# Patient Record
Sex: FEMALE | Race: WHITE | NOT HISPANIC OR LATINO | Employment: OTHER | URBAN - METROPOLITAN AREA
[De-identification: names, ages, dates, MRNs, and addresses within clinical notes are randomized per-mention and may not be internally consistent; named-entity substitution may affect disease eponyms.]

---

## 2017-01-31 ENCOUNTER — TELEPHONE (OUTPATIENT)
Dept: HEPATOLOGY | Facility: CLINIC | Age: 67
End: 2017-01-31

## 2017-01-31 NOTE — TELEPHONE ENCOUNTER
External records reviewed. Pt referred by Dr. Marvin Roldan. HCV RNA and genotype 1b noted. U/s done 12/12/16.  Spoke to pt. Consult scheduled on 2/23 at [facility]. Reminder mailed.

## 2017-02-23 ENCOUNTER — INITIAL CONSULT (OUTPATIENT)
Dept: HEPATOLOGY | Facility: CLINIC | Age: 67
End: 2017-02-23
Payer: MEDICAID

## 2017-02-23 ENCOUNTER — LAB VISIT (OUTPATIENT)
Dept: LAB | Facility: HOSPITAL | Age: 67
End: 2017-02-23
Attending: INTERNAL MEDICINE
Payer: MEDICAID

## 2017-02-23 VITALS
SYSTOLIC BLOOD PRESSURE: 123 MMHG | DIASTOLIC BLOOD PRESSURE: 78 MMHG | HEIGHT: 60 IN | WEIGHT: 108 LBS | HEART RATE: 76 BPM | RESPIRATION RATE: 18 BRPM | TEMPERATURE: 98 F | BODY MASS INDEX: 21.2 KG/M2

## 2017-02-23 DIAGNOSIS — B18.2 CHRONIC HEPATITIS C WITHOUT HEPATIC COMA: ICD-10-CM

## 2017-02-23 DIAGNOSIS — B18.2 CHRONIC HEPATITIS C WITHOUT HEPATIC COMA: Primary | ICD-10-CM

## 2017-02-23 PROCEDURE — 86790 VIRUS ANTIBODY NOS: CPT

## 2017-02-23 PROCEDURE — 99203 OFFICE O/P NEW LOW 30 MIN: CPT | Mod: S$PBB,,, | Performed by: PHYSICIAN ASSISTANT

## 2017-02-23 PROCEDURE — 86706 HEP B SURFACE ANTIBODY: CPT

## 2017-02-23 PROCEDURE — 86704 HEP B CORE ANTIBODY TOTAL: CPT

## 2017-02-23 PROCEDURE — 36415 COLL VENOUS BLD VENIPUNCTURE: CPT | Mod: PO

## 2017-02-23 PROCEDURE — 87340 HEPATITIS B SURFACE AG IA: CPT

## 2017-02-23 PROCEDURE — 99999 PR PBB SHADOW E&M-EST. PATIENT-LVL III: CPT | Mod: PBBFAC,,, | Performed by: PHYSICIAN ASSISTANT

## 2017-02-23 RX ORDER — AMOXICILLIN 500 MG
2 CAPSULE ORAL DAILY
COMMUNITY

## 2017-02-23 RX ORDER — LOVASTATIN 20 MG/1
20 TABLET ORAL NIGHTLY
COMMUNITY
End: 2019-11-07 | Stop reason: SDUPTHER

## 2017-02-23 RX ORDER — CALCIUM CARBONATE 500(1250)
1 TABLET ORAL DAILY
COMMUNITY

## 2017-02-23 RX ORDER — AMITRIPTYLINE HYDROCHLORIDE 25 MG/1
25 TABLET, FILM COATED ORAL NIGHTLY PRN
COMMUNITY
End: 2018-02-06

## 2017-02-23 RX ORDER — LISINOPRIL 20 MG/1
20 TABLET ORAL DAILY
COMMUNITY
End: 2019-11-07 | Stop reason: SDUPTHER

## 2017-02-23 RX ORDER — ALENDRONATE SODIUM 70 MG/1
70 TABLET ORAL
COMMUNITY
End: 2018-02-06

## 2017-02-23 NOTE — PROGRESS NOTES
HEPATOLOGY CLINIC VISIT NOTE - HCV clinic    OUTSIDE REFERRING PROVIDER: Chapo Roldan MD    CHIEF COMPLAINT: Hepatitis C     HISTORY: This is a 66 y.o. White female with recently diagnosed chronic hepatitis C, here for further eval / mngmt. Outside records from Dr Roldan have been received and reviewed.       HCV history:  Recently diagnosed through routine health screening  Risks:  has HCV. Denies transfusions, illicit drug use, tattoos  - Treatment naive  - Genotype 1b  - HCV RNA 2,530,000 IU/mL - 11/2016    Liver staging:  Fibrospect 12/2016 - F0-2    Labs / imaging reveal no evidence of compromised liver function, advanced fibrosis or portal HTN  Labs 11/2016 - normal liver panel and CBC w/ plt count > 300  Outside U/S 12/2016 - normal liver w/ normal portal vein. Normal spleen    Pt feels well  Denies jaundice, dark urine, abdominal distention, hematemesis, melena, slowed mentation.   No abnormal skin rashes. No generalized joint pain.                     Past Medical History   Diagnosis Date    Chronic hepatitis C     HTN (hypertension)     Hyperlipidemia     Osteoporosis      Past Surgical History   Procedure Laterality Date    Total abdominal hysterectomy w/ bilateral salpingoophorectomy         FAMILY HISTORY: Negative for liver disease    SOCIAL HISTORY:   .  has HCV. No kids  History   Smoking Status    Never Smoker   Smokeless Tobacco    Not on file     History   Alcohol Use No     History   Drug Use No       ROS:   No fever, chills, weight loss, fatigue  No chest pain, palpitations, dyspnea, cough  No abdominal pain, change in bowel pattern, nausea, vomiting  No dysuria, hematuria   No skin rashes   No headaches  No lower extremity edema  No depression or anxiety      PHYSICAL EXAM:  Friendly White female, in no acute distress; alert and oriented to person, place and time  VITALS: reviewed  HEENT: Sclerae anicteric.   NECK: Supple  CVS: Regular rate and rhythm. No  murmurs  LUNGS: Normal respiratory effort. Clear bilaterally  ABDOMEN: Flat, soft, nontender. No organomegaly or masses. No ascites or hernias. Good bowel sounds.    SKIN: Warm and dry. No jaundice, No obvious rashes.   EXTREMITIES: No lower extremity edema  NEURO/PSYCH: Normal gate. Memory intact. Thought and speech pattern appropriate. Behavior normal. No depression or anxiety noted.    RECENT LABS:  Outside labs 11/2/16  WBC 5.5, HGB 13.3,   AST 38, ALT 28, TBILI 0.4, ALK PHOS 50, ALBUMIN 5.0, CR 0.85, BUN 15, , K+ 3.8    RECENT IMAGING:  Outside U/S abdomen 12/12/16 - normal      ASSESSMENT  66 y.o. White female with:  1. CHRONIC HEPATITIS C, GENOTYPE 1B - treatment naive  -- FibroSpect 12/2016 - F0-2  -- Unknown Immunity to HAV & HBV      EDUCATION:  The natural history of Hepatitis C, including potential progression to cirrhosis was reviewed. Complications of cirrhosis, including hepatic decompensation, liver cancer, liver failure, need for liver transplant, and death were reviewed.     We discussed the increased progression of liver disease secondary to alcohol use; patient was advised to avoid alcohol completely.     Transmission of Hepatitis C was reviewed, including possible sexual transmission.  Patient should avoid sharing personal products such as razors, toothbrushes, etc.     We reviewed that vaccination against Hepatitis A and Hepatitis B is recommended if patient does not already have immunity.    Recommend avoiding raw seafood.  Limit acetaminophen to 2000mg daily.        PLAN:  1. HAV & HBV studies - vaccinate accordingly  2. FibroScan  3. F/U visit to review results and discuss antiviral therapy

## 2017-02-24 ENCOUNTER — TELEPHONE (OUTPATIENT)
Dept: HEPATOLOGY | Facility: CLINIC | Age: 67
End: 2017-02-24

## 2017-02-24 LAB
HBV CORE AB SERPL QL IA: POSITIVE
HBV SURFACE AB SER-ACNC: POSITIVE M[IU]/ML
HBV SURFACE AG SERPL QL IA: NEGATIVE
HEPATITIS A ANTIBODY, IGG: NEGATIVE

## 2017-02-24 NOTE — TELEPHONE ENCOUNTER
pls call pt:  Labs 2/23 show she has protection against HBV and does NOT need this vaccine    She does NOT have protection against HAV and this vaccine is recommended    i'll send a Rx to the [facility] so they can investigate whether her insurance covers it in the pharmacy. Someone will call to schedule    thanks

## 2017-02-24 NOTE — TELEPHONE ENCOUNTER
MA called pt to relay provider message call successful. Spoke with pt. Provider message relayed.Joe

## 2017-03-13 ENCOUNTER — PROCEDURE VISIT (OUTPATIENT)
Dept: HEPATOLOGY | Facility: CLINIC | Age: 67
End: 2017-03-13
Attending: INTERNAL MEDICINE
Payer: MEDICAID

## 2017-03-13 ENCOUNTER — OFFICE VISIT (OUTPATIENT)
Dept: HEPATOLOGY | Facility: CLINIC | Age: 67
End: 2017-03-13
Payer: MEDICAID

## 2017-03-13 VITALS
HEIGHT: 60 IN | DIASTOLIC BLOOD PRESSURE: 78 MMHG | TEMPERATURE: 97 F | OXYGEN SATURATION: 99 % | BODY MASS INDEX: 21.6 KG/M2 | HEART RATE: 74 BPM | WEIGHT: 110 LBS | RESPIRATION RATE: 18 BRPM | SYSTOLIC BLOOD PRESSURE: 161 MMHG

## 2017-03-13 DIAGNOSIS — B18.2 CHRONIC HEPATITIS C WITHOUT HEPATIC COMA: Primary | ICD-10-CM

## 2017-03-13 DIAGNOSIS — B18.2 CHRONIC HEPATITIS C WITHOUT HEPATIC COMA: ICD-10-CM

## 2017-03-13 PROCEDURE — 99999 PR PBB SHADOW E&M-EST. PATIENT-LVL III: CPT | Mod: PBBFAC,,, | Performed by: PHYSICIAN ASSISTANT

## 2017-03-13 PROCEDURE — 99213 OFFICE O/P EST LOW 20 MIN: CPT | Mod: S$PBB,,, | Performed by: PHYSICIAN ASSISTANT

## 2017-03-13 PROCEDURE — 91200 LIVER ELASTOGRAPHY: CPT | Mod: 26,S$PBB,, | Performed by: PHYSICIAN ASSISTANT

## 2017-03-13 PROCEDURE — 99213 OFFICE O/P EST LOW 20 MIN: CPT | Mod: PBBFAC | Performed by: PHYSICIAN ASSISTANT

## 2017-03-13 NOTE — PROGRESS NOTES
HEPATOLOGY CLINIC VISIT NOTE - HCV clinic    CHIEF COMPLAINT: Hepatitis C     HISTORY: This is a 66 y.o. White female with recently diagnosed chronic hepatitis C, here for f/u. She presents to [facility] today so a fibroscan could be done prior to visit.    Interval history:  - Natural immunity to HBV  - Lacking immunity to HAV - had first vaccine dose 3/2/17    Fibroscan today - minimal liver fibrosis at F0-1    Feels well  Denies jaundice, dark urine, hematemesis, melena, slowed mentation, abdominal distention.       HCV history:  Recently diagnosed through routine health screening  Risks:  has HCV. Denies transfusions, illicit drug use, tattoos  - Treatment naive  - Genotype 1b  - HCV RNA 2,530,000 IU/mL - 11/2016    Liver staging:  Fibrospect 12/2016 - F0-2  FibroScan today kPa 5.8, F0-1    Labs / imaging reveal no evidence of compromised liver function, advanced fibrosis or portal HTN  Labs 11/2016 - normal liver panel and CBC w/ plt count > 300  Outside U/S 12/2016 - normal liver w/ normal portal vein. Normal spleen                  Past Medical History:   Diagnosis Date    Chronic hepatitis C     HTN (hypertension)     Hyperlipidemia     Osteoporosis      Past Surgical History:   Procedure Laterality Date    TOTAL ABDOMINAL HYSTERECTOMY W/ BILATERAL SALPINGOOPHORECTOMY         FAMILY HISTORY: Negative for liver disease    SOCIAL HISTORY:   .  has HCV. No kids  History   Smoking Status    Never Smoker   Smokeless Tobacco    Not on file     History   Alcohol Use No     History   Drug Use No       ROS:   No fever, chills, weight loss, fatigue  No chest pain, palpitations, dyspnea, cough  No abdominal pain, change in bowel pattern, nausea, vomiting  No skin rashes   No headaches  No lower extremity edema  No depression or anxiety      PHYSICAL EXAM:  Friendly White female, in no acute distress; alert and oriented to person, place and time  VITALS: reviewed  HEENT: Sclerae  anicteric.   NECK: Supple  LUNGS: Normal respiratory effort.   ABDOMEN: Flat, soft, nontender.  SKIN: Warm and dry. No jaundice, No obvious rashes.   EXTREMITIES: No lower extremity edema  NEURO/PSYCH: Normal gate. Memory intact. Thought and speech pattern appropriate. Behavior normal. No depression or anxiety noted.    RECENT LABS:  Outside labs 11/2/16  WBC 5.5, HGB 13.3,   AST 38, ALT 28, TBILI 0.4, ALK PHOS 50, ALBUMIN 5.0, CR 0.85, BUN 15, , K+ 3.8    RECENT IMAGING:  Outside U/S abdomen 12/12/16 - normal      ASSESSMENT  66 y.o. White female with:  1. CHRONIC HEPATITIS C, GENOTYPE 1B - treatment naive  -- FibroSpect 12/2016 - F0-2 // FibroScan today F0-1  -- Lacking Immunity to HAV (s/p 1 dose vaccine) & Natural immunity to HBV      DISCUSSION:  Significance of liver staging reviewed.    Goal of antiviral therapy discussed however Medicaid not expected to cover her meds given minimal liver fibrosis. Pt notes she will be changing to Medicare later this year.        PLAN:  Pt to notify me when she has new insurance so we can proceed w/ HCV antiviral therapy  (she will be changing to Medicare later this year)    Recall for f/u visit in 1 year w/ CBC, CMP, INR in case she has not returned by then    CC: Chapo Roldan MD

## 2017-03-13 NOTE — PROCEDURES
Procedures   Name: Tanika Olivera  Date of Procedure : 2017   :: Jennifer B Scheuermann, PA  Diagnosis: HCV  Probe: M    Fibroscan readin.8 KPa    IQR/med:13 %    Fibrosis:F 0-1

## 2017-12-08 ENCOUNTER — TELEPHONE (OUTPATIENT)
Dept: HEPATOLOGY | Facility: CLINIC | Age: 67
End: 2017-12-08

## 2017-12-08 NOTE — TELEPHONE ENCOUNTER
pls schedule pt for appt after 12/30/17 to discuss HCV rx  I believe she will khloe new insurance after then  thanks

## 2017-12-12 NOTE — TELEPHONE ENCOUNTER
I spoke with patient.  She asked that f/u be scheduled 2/6/18 in [place]; done and reminder notice mailed.

## 2018-02-06 ENCOUNTER — LAB VISIT (OUTPATIENT)
Dept: LAB | Facility: HOSPITAL | Age: 68
End: 2018-02-06
Attending: PHYSICIAN ASSISTANT
Payer: MEDICARE

## 2018-02-06 ENCOUNTER — EPISODE CHANGES (OUTPATIENT)
Dept: HEPATOLOGY | Facility: CLINIC | Age: 68
End: 2018-02-06

## 2018-02-06 ENCOUNTER — OFFICE VISIT (OUTPATIENT)
Dept: HEPATOLOGY | Facility: CLINIC | Age: 68
End: 2018-02-06
Payer: MEDICARE

## 2018-02-06 ENCOUNTER — TELEPHONE (OUTPATIENT)
Dept: PHARMACY | Facility: HOSPITAL | Age: 68
End: 2018-02-06

## 2018-02-06 VITALS
DIASTOLIC BLOOD PRESSURE: 62 MMHG | WEIGHT: 112.63 LBS | SYSTOLIC BLOOD PRESSURE: 102 MMHG | RESPIRATION RATE: 18 BRPM | TEMPERATURE: 99 F | HEIGHT: 59 IN | HEART RATE: 61 BPM | OXYGEN SATURATION: 98 % | BODY MASS INDEX: 22.71 KG/M2

## 2018-02-06 DIAGNOSIS — B18.2 CHRONIC HEPATITIS C WITHOUT HEPATIC COMA: Primary | ICD-10-CM

## 2018-02-06 DIAGNOSIS — B18.2 CHRONIC HEPATITIS C WITHOUT HEPATIC COMA: ICD-10-CM

## 2018-02-06 LAB
ALBUMIN SERPL BCP-MCNC: 3.9 G/DL
ALP SERPL-CCNC: 61 U/L
ALT SERPL W/O P-5'-P-CCNC: 44 U/L
ANION GAP SERPL CALC-SCNC: 7 MMOL/L
AST SERPL-CCNC: 67 U/L
BASOPHILS # BLD AUTO: 0.01 K/UL
BASOPHILS NFR BLD: 0.2 %
BILIRUB SERPL-MCNC: 0.4 MG/DL
BUN SERPL-MCNC: 11 MG/DL
CALCIUM SERPL-MCNC: 10.1 MG/DL
CHLORIDE SERPL-SCNC: 104 MMOL/L
CO2 SERPL-SCNC: 31 MMOL/L
CREAT SERPL-MCNC: 0.8 MG/DL
DIFFERENTIAL METHOD: ABNORMAL
EOSINOPHIL # BLD AUTO: 0.1 K/UL
EOSINOPHIL NFR BLD: 2.2 %
ERYTHROCYTE [DISTWIDTH] IN BLOOD BY AUTOMATED COUNT: 12.9 %
EST. GFR  (AFRICAN AMERICAN): >60 ML/MIN/1.73 M^2
EST. GFR  (NON AFRICAN AMERICAN): >60 ML/MIN/1.73 M^2
GLUCOSE SERPL-MCNC: 97 MG/DL
HCT VFR BLD AUTO: 41.1 %
HGB BLD-MCNC: 13.4 G/DL
IMM GRANULOCYTES # BLD AUTO: 0.01 K/UL
IMM GRANULOCYTES NFR BLD AUTO: 0.2 %
INR PPP: 0.9
LYMPHOCYTES # BLD AUTO: 1.7 K/UL
LYMPHOCYTES NFR BLD: 43 %
MCH RBC QN AUTO: 30.2 PG
MCHC RBC AUTO-ENTMCNC: 32.6 G/DL
MCV RBC AUTO: 93 FL
MONOCYTES # BLD AUTO: 0.5 K/UL
MONOCYTES NFR BLD: 11.9 %
NEUTROPHILS # BLD AUTO: 1.7 K/UL
NEUTROPHILS NFR BLD: 42.5 %
NRBC BLD-RTO: 0 /100 WBC
PLATELET # BLD AUTO: 342 K/UL
PMV BLD AUTO: 9.9 FL
POTASSIUM SERPL-SCNC: 4.2 MMOL/L
PROT SERPL-MCNC: 8.1 G/DL
PROTHROMBIN TIME: 9.6 SEC
RBC # BLD AUTO: 4.43 M/UL
SODIUM SERPL-SCNC: 142 MMOL/L
WBC # BLD AUTO: 4.02 K/UL

## 2018-02-06 PROCEDURE — 99999 PR PBB SHADOW E&M-EST. PATIENT-LVL III: CPT | Mod: PBBFAC,,, | Performed by: PHYSICIAN ASSISTANT

## 2018-02-06 PROCEDURE — 85610 PROTHROMBIN TIME: CPT | Mod: PO

## 2018-02-06 PROCEDURE — 3008F BODY MASS INDEX DOCD: CPT | Mod: S$GLB,,, | Performed by: PHYSICIAN ASSISTANT

## 2018-02-06 PROCEDURE — 99213 OFFICE O/P EST LOW 20 MIN: CPT | Mod: S$GLB,,, | Performed by: PHYSICIAN ASSISTANT

## 2018-02-06 PROCEDURE — 87522 HEPATITIS C REVRS TRNSCRPJ: CPT

## 2018-02-06 PROCEDURE — 80053 COMPREHEN METABOLIC PANEL: CPT

## 2018-02-06 PROCEDURE — 36415 COLL VENOUS BLD VENIPUNCTURE: CPT | Mod: PO

## 2018-02-06 PROCEDURE — 85025 COMPLETE CBC W/AUTO DIFF WBC: CPT

## 2018-02-06 RX ORDER — ESTRADIOL 0.1 MG/G
CREAM VAGINAL
COMMUNITY
Start: 2018-01-18 | End: 2019-09-27 | Stop reason: SDUPTHER

## 2018-02-06 RX ORDER — VELPATASVIR AND SOFOSBUVIR 100; 400 MG/1; MG/1
1 TABLET, FILM COATED ORAL DAILY
Qty: 28 TABLET | Refills: 2 | Status: SHIPPED | OUTPATIENT
Start: 2018-02-06 | End: 2018-05-21 | Stop reason: ALTCHOICE

## 2018-02-06 RX ORDER — MIRTAZAPINE 15 MG/1
15 TABLET, FILM COATED ORAL NIGHTLY
COMMUNITY
Start: 2018-01-17 | End: 2019-11-07 | Stop reason: SDUPTHER

## 2018-02-06 NOTE — TELEPHONE ENCOUNTER
[facility] received a prescription for Epclusa and we will contact their insurance company to find out if the medication is covered. We will update patient of status as more information is received. feel free to give us a call with  any questions at [phone].

## 2018-02-06 NOTE — PROGRESS NOTES
HEPATOLOGY CLINIC VISIT NOTE - HCV clinic    CHIEF COMPLAINT: Hepatitis C     HISTORY: This is a 67 y.o. White female with chronic hepatitis C, here for f/u to discuss antiviral therapy. Previously HCV treatment not pursued b/c Medicaid wouldn't cover Rx w/ minimal fibrosis    Feels well  No changes to health history  Very anxious / motivated to have HCV treated.  has been treated since her last visit  Denies jaundice, dark urine, hematemesis, melena, slowed mentation, abdominal distention.       HCV history:  Diagnosed late 2016 through routine health screening  Risks:  has HCV. Denies transfusions, illicit drug use, tattoos  - Treatment naive  - Genotype 1b  - HCV RNA 2,530,000 IU/mL - 11/2016    Liver staging:  Fibrospect 12/2016 - F0-2  FibroScan 3/2017 - kPa 5.8, F0-1    Prior labs/imaging supported fibroscan findings  No evidence of compromised liver function, advanced fibrosis or portal HTN                    Past Medical History:   Diagnosis Date    Chronic hepatitis C     HTN (hypertension)     Hyperlipidemia     Osteoporosis      Past Surgical History:   Procedure Laterality Date    TOTAL ABDOMINAL HYSTERECTOMY W/ BILATERAL SALPINGOOPHORECTOMY         FAMILY HISTORY: Negative for liver disease    SOCIAL HISTORY:   .  has HCV. No kids  History   Smoking Status    Never Smoker   Smokeless Tobacco    Not on file     History   Alcohol Use No     History   Drug Use No       ROS:   No fever, chills, weight loss, fatigue  No chest pain, palpitations, dyspnea, cough  No abdominal pain, change in bowel pattern, nausea, vomiting  No skin rashes   No headaches  No lower extremity edema  No depression or anxiety      PHYSICAL EXAM:  Friendly White female, in no acute distress; alert and oriented to person, place and time  VITALS: reviewed  HEENT: Sclerae anicteric.   NECK: Supple  LUNGS: Normal respiratory effort. Clear bilaterally  CVS: Regular rate and rhythm, No murmurs  ABDOMEN:  Flat, soft, nontender. No organomegaly or masses or ascites  SKIN: Warm and dry. No jaundice, No obvious rashes.   EXTREMITIES: No lower extremity edema  NEURO/PSYCH: Normal gate. Memory intact. Thought and speech pattern appropriate. Behavior normal. No depression or anxiety noted.    RECENT LABS:  Outside labs 11/2/16  WBC 5.5, HGB 13.3,   AST 38, ALT 28, TBILI 0.4, ALK PHOS 50, ALBUMIN 5.0, CR 0.85, BUN 15, , K+ 3.8    RECENT IMAGING:  Outside U/S 12/2016 - normal liver w/ normal portal vein. Normal spleen      ASSESSMENT  67 y.o. White female with:  1. CHRONIC HEPATITIS C, GENOTYPE 1B - treatment naive  -- FibroSpect 12/2016 - F0-2 // FibroScan 3/2017 - F0-1  -- s/p HAV vaccine   -- prior resolved HBV infection (pos HBcAb and HBsAb, neg HBsg)      DISCUSSION:  Discussed goal of HCV eradication to prevent progression of liver disease.  Discussed use of Epclusa daily x 12 weeks w/ potential side effects of fatigue and headache.     Reviewed limitations on acid suppressant medications due to DDI w/ Epclusa:  -- Antacids, H2 Receptor Antagonist, PPI - not taking  Patient instructed to contact me if experiencing acid related symptoms so further recommendations can be made regarding acid suppression therapy.      Herbal / alternative therapies must be discontinued  Discussed importance of medication adherence and risk of treatment failure / viral resistance if not adherent. Pt has verbalized understanding.         PLAN:  1. CBC,CMP,INR, HCV RNA today  2. Obtain authorization to treat HCV with Epclusa daily x 12 weeks  -- Rx will be routed to [facility]  -- Patient will notify me of exact treatment start date so appropriate lab f/u can be scheduled.      CC: Cahpo Roldan MD

## 2018-02-08 LAB
HCV LOG: 5.94 LOG (10) IU/ML
HCV RNA QUANT PCR: ABNORMAL IU/ML
HCV, QUALITATIVE: DETECTED IU/ML

## 2018-02-08 NOTE — TELEPHONE ENCOUNTER
DOCUMENTATION ONLY:  Prior authorization for Epclusa approved from 02/08/2018 to 05/03/2018 x 12 weeks of treatment.   Case ID# None    Co-pay: $8.35    Patient Assistance IS NOT required. Forward to clinical pharmacist for consult & shipment.

## 2018-02-09 ENCOUNTER — TELEPHONE (OUTPATIENT)
Dept: HEPATOLOGY | Facility: CLINIC | Age: 68
End: 2018-02-09

## 2018-02-09 ENCOUNTER — EPISODE CHANGES (OUTPATIENT)
Dept: HEPATOLOGY | Facility: CLINIC | Age: 68
End: 2018-02-09

## 2018-02-15 ENCOUNTER — TELEPHONE (OUTPATIENT)
Dept: PHARMACY | Facility: CLINIC | Age: 68
End: 2018-02-15

## 2018-02-15 NOTE — TELEPHONE ENCOUNTER
Patient called for initial consultation and shipment on Epclusa - na - lvm for call back.    Daniel Szymanski, MITUL.Ph.  Clinical Pharmacist  [facility]  Phone: [phone]

## 2018-02-15 NOTE — TELEPHONE ENCOUNTER
Initial Epclusa consult completed on 2/15. Epclusa will be shipped on  to arrive at patient's home on  via [employer]. $8.35 copay. Patient will start Epclusa on . Address confirmed, CC on file. Confirmed 2 patient identifiers - name and . Therapy Appropriate.     Epclusa 400/100mg- Take one tablet by mouth daily x 12 weeks  Counseling was reviewed:   1. Patient MUST take Epclusa at the SAME time every day.   2. Patient MUST avoid acid reducers without consulting with myself or provider first. Antacids are to be spaced out at least 4 hours apart from Epclusa. Patient denies currently using any acid supressing or controlling medications and was counseled not to begin without consulting provider or myself.  Furthermore patient was counseled to separate calcium carbonate (potential antiacid effect) as well as other supplements she cannot withhold (multivitamin) by at least 4 hours from epclusa.  Patient plans to take Epclusa by itself each morning, supplements at dinner and blood pressure and other medications at night.    3. Potential Side effects include: nausea, headaches, insomnia and fatigue.   Headache: Patient may treat with OTC remedies. If Tylenol is used, dose should not exceed 2000mg per day.    4. Medication list reviewed. No DDIs or allergies noted. Patient MUST contact myself or provider prior to starting any new OTC, herbal, or prescription drugs to avoid potential DDIs.    DDI: Medication list reviewed and potential DDIs addressed.    Discussed the importance of staying well hydrated while on therapy. Compliance stressed - patient to take missed doses as soon as remembered, but NOT to take 2 doses in one day. Patient will report questions or concerns to myself or practitioner. Patient verbalizes understanding. Patient plans to start Epclusa on .  Consultation included: indication; goals of treatment; administration; storage and handling; side effects; how to handle side effects; the  importance of compliance; how to handle missed doses; the importance of laboratory monitoring; the importance of keeping all follow up appointments.  Patient understands to report any medication changes to [facility] and provider. All questions answered and addressed to patients satisfaction. I will f/u with her in 1 week from start, [facility] to contact patient in 3 weeks for refills.     MITUL Pritchett.Ph.  Clinical Pharmacist  [facility]  Phone: [phone]

## 2018-02-16 ENCOUNTER — EPISODE CHANGES (OUTPATIENT)
Dept: HEPATOLOGY | Facility: CLINIC | Age: 68
End: 2018-02-16

## 2018-02-21 ENCOUNTER — TELEPHONE (OUTPATIENT)
Dept: HEPATOLOGY | Facility: CLINIC | Age: 68
End: 2018-02-21

## 2018-02-21 DIAGNOSIS — B18.2 CHRONIC HEPATITIS C WITHOUT HEPATIC COMA: Primary | ICD-10-CM

## 2018-02-22 ENCOUNTER — EPISODE CHANGES (OUTPATIENT)
Dept: HEPATOLOGY | Facility: CLINIC | Age: 68
End: 2018-02-22

## 2018-02-22 NOTE — TELEPHONE ENCOUNTER
Pt beginning 12 weeks of Epclusa on 2/21/18  G1b, tx naive, F0-1    Pls schedule:  - CMP, HCV RNA, HBV DNA at week 6 - 4/3/18  - CMP, HCV RNA, HBV DNA at week 12 - end of treatment - 5/15/18  - CMP, HCV RNA, HBV DNA - SVR12 - 8/7/18    thanks

## 2018-02-28 ENCOUNTER — TELEPHONE (OUTPATIENT)
Dept: PHARMACY | Facility: CLINIC | Age: 68
End: 2018-02-28

## 2018-02-28 NOTE — TELEPHONE ENCOUNTER
Patient called for initial clinical f/u on Epclusa - na - lvm for call back.     Daniel Szymanski, MITUL.Ph.  Clinical Pharmacist  [facility]  Phone: [phone]

## 2018-02-28 NOTE — TELEPHONE ENCOUNTER
Initial clinical follow-up conducted for Epclusa. Name/ confirmed. no missed doses; no new medications; no side effects noted. Patient understands to report any medication changes to [facility] and provider. All questions answered and addressed to patients satisfaction.      MITUL Pritchett.Ph.  Clinical Pharmacist  [facility]  Phone: [phone]

## 2018-03-09 ENCOUNTER — TELEPHONE (OUTPATIENT)
Dept: PHARMACY | Facility: CLINIC | Age: 68
End: 2018-03-09

## 2018-03-09 NOTE — TELEPHONE ENCOUNTER
Epclusa (2 of 3)  refill confirmed. We will ship Epclusa refill on 3/12 via fedex to arrive on 3/13. $0.00 copay- 004. Confirmed 2 patient identifiers - name and .      Patient is taking Epclusa daily at 8:30am as part of her morning routine.  She was not home to give a dose count.  Pt reports not having any side effects so far. No missed doses, no new medications, no new allergies or health conditions reported at this time. All questions answered and addressed to patients satisfaction. Pt verbalized understanding.

## 2018-04-02 ENCOUNTER — TELEPHONE (OUTPATIENT)
Dept: PHARMACY | Facility: CLINIC | Age: 68
End: 2018-04-02

## 2018-04-02 NOTE — TELEPHONE ENCOUNTER
Epclusa refill confirmed.  Patient shipment scheduled for delivery 18.   $0 copay- 004. Confirmed 2 patient identifiers - name and .      Patient reports taking Epclusa routinely and has about 14 doses on hand.  No side effects noted.  No missed doses, no new medications, no new allergies or health conditions reported at this time. Patient doing well and has no complaints at this time.  All questions answered and addressed to patients satisfaction. Pt verbalized understanding.    Juan Gifford, PharmD  Clinical Pharmacist  [facility]  [phone]

## 2018-04-03 ENCOUNTER — LAB VISIT (OUTPATIENT)
Dept: LAB | Facility: HOSPITAL | Age: 68
End: 2018-04-03
Attending: PHYSICIAN ASSISTANT
Payer: MEDICARE

## 2018-04-03 DIAGNOSIS — B18.2 CHRONIC HEPATITIS C WITHOUT HEPATIC COMA: ICD-10-CM

## 2018-04-03 LAB
ALBUMIN SERPL BCP-MCNC: 3.9 G/DL
ALP SERPL-CCNC: 54 U/L
ALT SERPL W/O P-5'-P-CCNC: 16 U/L
ANION GAP SERPL CALC-SCNC: 7 MMOL/L
AST SERPL-CCNC: 30 U/L
BILIRUB SERPL-MCNC: 0.6 MG/DL
BUN SERPL-MCNC: 12 MG/DL
CALCIUM SERPL-MCNC: 9.3 MG/DL
CHLORIDE SERPL-SCNC: 104 MMOL/L
CO2 SERPL-SCNC: 28 MMOL/L
CREAT SERPL-MCNC: 0.7 MG/DL
EST. GFR  (AFRICAN AMERICAN): >60 ML/MIN/1.73 M^2
EST. GFR  (NON AFRICAN AMERICAN): >60 ML/MIN/1.73 M^2
GLUCOSE SERPL-MCNC: 92 MG/DL
POTASSIUM SERPL-SCNC: 3.9 MMOL/L
PROT SERPL-MCNC: 7.7 G/DL
SODIUM SERPL-SCNC: 139 MMOL/L

## 2018-04-03 PROCEDURE — 36415 COLL VENOUS BLD VENIPUNCTURE: CPT | Mod: PO

## 2018-04-03 PROCEDURE — 87522 HEPATITIS C REVRS TRNSCRPJ: CPT

## 2018-04-03 PROCEDURE — 80053 COMPREHEN METABOLIC PANEL: CPT

## 2018-04-03 PROCEDURE — 87517 HEPATITIS B DNA QUANT: CPT

## 2018-04-05 ENCOUNTER — TELEPHONE (OUTPATIENT)
Dept: HEPATOLOGY | Facility: CLINIC | Age: 68
End: 2018-04-05

## 2018-04-05 LAB
HCV LOG: <1.08 LOG (10) IU/ML
HCV RNA QUANT PCR: <12 IU/ML
HCV, QUALITATIVE: DETECTED IU/ML
HEPATITIS B VIRAL DNA - QUANTITATIVE: <10 IU/ML
HEPATITIS B VIRUS DNA: NOT DETECTED
LOG HBV IU/ML: <1 LOG (10) IU/ML

## 2018-04-05 NOTE — TELEPHONE ENCOUNTER
HCV LAB REVIEW  Week 6 of Epclusa, planning on 12 weeks treatment  G1b, tx naive, F0-1    Pertinent labs:  CMP stable  HCV <12, detected  HBV neg    Spoke to pt  Labs look good. Liver enzymes now normal. HCV is too low to count  - Continue Epclusa - 1 pill daily - don't miss any doses.    Next labs due   - CMP, HCV RNA, HBV DNA at week 12 - end of treatment - 5/15/18  - CMP, HCV RNA, HBV DNA - SVR12 - 8/7/18

## 2018-05-15 ENCOUNTER — LAB VISIT (OUTPATIENT)
Dept: LAB | Facility: HOSPITAL | Age: 68
End: 2018-05-15
Attending: PHYSICIAN ASSISTANT
Payer: MEDICARE

## 2018-05-15 DIAGNOSIS — B18.2 CHRONIC HEPATITIS C WITHOUT HEPATIC COMA: ICD-10-CM

## 2018-05-15 LAB
ALBUMIN SERPL BCP-MCNC: 4 G/DL
ALP SERPL-CCNC: 58 U/L
ALT SERPL W/O P-5'-P-CCNC: 18 U/L
ANION GAP SERPL CALC-SCNC: 10 MMOL/L
AST SERPL-CCNC: 33 U/L
BILIRUB SERPL-MCNC: 0.4 MG/DL
BUN SERPL-MCNC: 13 MG/DL
CALCIUM SERPL-MCNC: 10.1 MG/DL
CHLORIDE SERPL-SCNC: 102 MMOL/L
CO2 SERPL-SCNC: 26 MMOL/L
CREAT SERPL-MCNC: 0.8 MG/DL
EST. GFR  (AFRICAN AMERICAN): >60 ML/MIN/1.73 M^2
EST. GFR  (NON AFRICAN AMERICAN): >60 ML/MIN/1.73 M^2
GLUCOSE SERPL-MCNC: 100 MG/DL
POTASSIUM SERPL-SCNC: 4.2 MMOL/L
PROT SERPL-MCNC: 8 G/DL
SODIUM SERPL-SCNC: 138 MMOL/L

## 2018-05-15 PROCEDURE — 80053 COMPREHEN METABOLIC PANEL: CPT

## 2018-05-15 PROCEDURE — 36415 COLL VENOUS BLD VENIPUNCTURE: CPT | Mod: PO

## 2018-05-15 PROCEDURE — 87522 HEPATITIS C REVRS TRNSCRPJ: CPT

## 2018-05-15 PROCEDURE — 87517 HEPATITIS B DNA QUANT: CPT

## 2018-05-16 ENCOUNTER — EPISODE CHANGES (OUTPATIENT)
Dept: HEPATOLOGY | Facility: CLINIC | Age: 68
End: 2018-05-16

## 2018-05-17 LAB
HCV LOG: <1.08 LOG (10) IU/ML
HCV RNA QUANT PCR: <12 IU/ML
HCV, QUALITATIVE: NOT DETECTED IU/ML

## 2018-05-18 LAB
HEPATITIS B VIRAL DNA - QUANTITATIVE: <10 IU/ML
HEPATITIS B VIRUS DNA: NOT DETECTED
LOG HBV IU/ML: <1 LOG (10) IU/ML

## 2018-05-21 ENCOUNTER — TELEPHONE (OUTPATIENT)
Dept: HEPATOLOGY | Facility: CLINIC | Age: 68
End: 2018-05-21

## 2018-05-21 ENCOUNTER — TELEPHONE (OUTPATIENT)
Dept: PHARMACY | Facility: CLINIC | Age: 68
End: 2018-05-21

## 2018-05-21 NOTE — TELEPHONE ENCOUNTER
Patient called for quality of life assessment following completion of Epclusa.  No answer - unable to leave voicemail.    Juan Gifford, PharmD  Clinical Pharmacist  [facility]  [phone]

## 2018-05-21 NOTE — TELEPHONE ENCOUNTER
HCV LAB REVIEW  Week 12 of Epclusa,  END OF TREATMENT  G1b, tx naive, F0-1    Pertinent labs:  5/15  CMP stable  HCV neg  HBV neg    Spoke to pt  Labs look good. Liver enzymes normal. HCV negative  Patient should be finished HCV treatment now.   There is a small chance the virus can return over the next 3 months. We will monitor blood for this.      Next labs due   - CMP, HCV RNA, HBV DNA - SVR12 - 8/7/18

## 2018-05-23 NOTE — TELEPHONE ENCOUNTER
QOL assessment completed after completion of Epclusa: Patient doing well with no issues.  Patient states she did not have any symptoms of Hep C prior to initialation of Epclusa theray, during treatment, or after completion.  Patient is not physically limited and is able to complete daily tasks without difficulty.  Patient runs/exercises daily.  Patient aware of SVR 12 and will keep lab appointment.  Informed patient to call back with any additional issues or questions.  Pt verbalized understanding.    Juan Gifford, PharmD  Clinical Pharmacist  [facility]  [phone]

## 2018-08-07 ENCOUNTER — EPISODE CHANGES (OUTPATIENT)
Dept: HEPATOLOGY | Facility: CLINIC | Age: 68
End: 2018-08-07

## 2018-08-07 ENCOUNTER — LAB VISIT (OUTPATIENT)
Dept: LAB | Facility: HOSPITAL | Age: 68
End: 2018-08-07
Attending: PHYSICIAN ASSISTANT
Payer: MEDICARE

## 2018-08-07 DIAGNOSIS — B18.2 CHRONIC HEPATITIS C WITHOUT HEPATIC COMA: ICD-10-CM

## 2018-08-07 LAB
ALBUMIN SERPL BCP-MCNC: 4.1 G/DL
ALP SERPL-CCNC: 55 U/L
ALT SERPL W/O P-5'-P-CCNC: 15 U/L
ANION GAP SERPL CALC-SCNC: 8 MMOL/L
AST SERPL-CCNC: 30 U/L
BILIRUB SERPL-MCNC: 0.5 MG/DL
BUN SERPL-MCNC: 18 MG/DL
CALCIUM SERPL-MCNC: 10.1 MG/DL
CHLORIDE SERPL-SCNC: 101 MMOL/L
CO2 SERPL-SCNC: 28 MMOL/L
CREAT SERPL-MCNC: 0.8 MG/DL
EST. GFR  (AFRICAN AMERICAN): >60 ML/MIN/1.73 M^2
EST. GFR  (NON AFRICAN AMERICAN): >60 ML/MIN/1.73 M^2
GLUCOSE SERPL-MCNC: 104 MG/DL
POTASSIUM SERPL-SCNC: 4.6 MMOL/L
PROT SERPL-MCNC: 7.7 G/DL
SODIUM SERPL-SCNC: 137 MMOL/L

## 2018-08-07 PROCEDURE — 36415 COLL VENOUS BLD VENIPUNCTURE: CPT | Mod: PO

## 2018-08-07 PROCEDURE — 80053 COMPREHEN METABOLIC PANEL: CPT

## 2018-08-07 PROCEDURE — 87522 HEPATITIS C REVRS TRNSCRPJ: CPT

## 2018-08-07 PROCEDURE — 87517 HEPATITIS B DNA QUANT: CPT

## 2018-08-09 ENCOUNTER — TELEPHONE (OUTPATIENT)
Dept: HEPATOLOGY | Facility: CLINIC | Age: 68
End: 2018-08-09

## 2018-08-09 DIAGNOSIS — Z86.19 HISTORY OF HEPATITIS C: ICD-10-CM

## 2018-08-09 LAB
HCV LOG: <1.08 LOG (10) IU/ML
HCV RNA QUANT PCR: <12 IU/ML
HCV, QUALITATIVE: NOT DETECTED IU/ML
HEPATITIS B VIRAL DNA - QUANTITATIVE: <10 IU/ML
HEPATITIS B VIRUS DNA: NOT DETECTED
LOG HBV IU/ML: <1 LOG (10) IU/ML

## 2018-08-09 NOTE — TELEPHONE ENCOUNTER
HCV LAB REVIEW  Pt completed 12 weeks of Epclusa, 5/15/18  G1b, tx naive, F0-1    Pertinent labs:  8/7/18  CMP stable  HCV neg  HBV neg    These labs document SVR12 following successful HCV treatment with Epclusa  This is consistent with a cure. Relapse is not expected.   No immunity is conferred and patient could be reinfected.     Pt has been notified       Please schedule labs - HCV RNA - SVR24 - 11/7/18

## 2018-08-10 ENCOUNTER — EPISODE CHANGES (OUTPATIENT)
Dept: HEPATOLOGY | Facility: CLINIC | Age: 68
End: 2018-08-10

## 2018-11-07 ENCOUNTER — LAB VISIT (OUTPATIENT)
Dept: LAB | Facility: HOSPITAL | Age: 68
End: 2018-11-07
Attending: PHYSICIAN ASSISTANT
Payer: MEDICARE

## 2018-11-07 DIAGNOSIS — Z86.19 HISTORY OF HEPATITIS C: ICD-10-CM

## 2018-11-07 PROCEDURE — 87522 HEPATITIS C REVRS TRNSCRPJ: CPT

## 2018-11-07 PROCEDURE — 36415 COLL VENOUS BLD VENIPUNCTURE: CPT | Mod: PO

## 2018-11-08 ENCOUNTER — EPISODE CHANGES (OUTPATIENT)
Dept: HEPATOLOGY | Facility: CLINIC | Age: 68
End: 2018-11-08

## 2018-11-09 LAB
HCV RNA SERPL NAA+PROBE-LOG IU: <1.08 LOG (10) IU/ML
HCV RNA SERPL QL NAA+PROBE: NOT DETECTED IU/ML
HCV RNA SPEC NAA+PROBE-ACNC: <12 IU/ML

## 2018-11-10 DIAGNOSIS — Z86.19 HISTORY OF HEPATITIS C: Primary | ICD-10-CM

## 2018-11-12 ENCOUNTER — TELEPHONE (OUTPATIENT)
Dept: HEPATOLOGY | Facility: CLINIC | Age: 68
End: 2018-11-12

## 2018-11-12 NOTE — TELEPHONE ENCOUNTER
----- Message from Jennifer B. Scheuermann, PA sent at 11/10/2018 12:59 PM CST -----  Virus negative 24 weeks after ending HCV therapy - indicates SVR24  Pt was notified via letter     Please schedule HCV RNA in 1 year     Normal for race

## 2019-09-27 DIAGNOSIS — N95.2 ATROPHIC VAGINITIS: Primary | ICD-10-CM

## 2019-09-27 RX ORDER — ESTRADIOL 0.1 MG/G
2 CREAM VAGINAL
Qty: 4 G | Refills: 5 | Status: SHIPPED | OUTPATIENT
Start: 2019-09-27 | End: 2019-11-07 | Stop reason: SDUPTHER

## 2019-09-27 NOTE — TELEPHONE ENCOUNTER
----- Message from Daren Villalobos sent at 9/27/2019 11:23 AM CDT -----  Contact: Tanika Olivera  Estrbettie Creame 0.1MG/ GM sent to [facility] on [address]  Pt# [phone]

## 2019-11-07 ENCOUNTER — OFFICE VISIT (OUTPATIENT)
Dept: FAMILY MEDICINE | Facility: CLINIC | Age: 69
End: 2019-11-07
Payer: MEDICARE

## 2019-11-07 VITALS
DIASTOLIC BLOOD PRESSURE: 84 MMHG | WEIGHT: 112 LBS | HEIGHT: 59 IN | HEART RATE: 60 BPM | SYSTOLIC BLOOD PRESSURE: 138 MMHG | BODY MASS INDEX: 22.58 KG/M2

## 2019-11-07 DIAGNOSIS — B18.2 CHRONIC HEPATITIS C WITHOUT HEPATIC COMA: ICD-10-CM

## 2019-11-07 DIAGNOSIS — N95.2 ATROPHIC VAGINITIS: ICD-10-CM

## 2019-11-07 DIAGNOSIS — F51.01 PRIMARY INSOMNIA: ICD-10-CM

## 2019-11-07 DIAGNOSIS — I10 HYPERTENSION, UNSPECIFIED TYPE: Primary | ICD-10-CM

## 2019-11-07 DIAGNOSIS — E78.5 HYPERLIPIDEMIA, UNSPECIFIED HYPERLIPIDEMIA TYPE: ICD-10-CM

## 2019-11-07 DIAGNOSIS — Z23 NEED FOR VACCINATION AGAINST STREPTOCOCCUS PNEUMONIAE: ICD-10-CM

## 2019-11-07 DIAGNOSIS — F41.9 ANXIETY DISORDER, UNSPECIFIED TYPE: ICD-10-CM

## 2019-11-07 PROCEDURE — 1101F PT FALLS ASSESS-DOCD LE1/YR: CPT | Mod: S$GLB,,, | Performed by: FAMILY MEDICINE

## 2019-11-07 PROCEDURE — G0009 PNEUMOCOCCAL POLYSACCHARIDE VACCINE 23-VALENT =>2YO SQ IM: ICD-10-PCS | Mod: S$GLB,,, | Performed by: FAMILY MEDICINE

## 2019-11-07 PROCEDURE — 99214 OFFICE O/P EST MOD 30 MIN: CPT | Mod: 25,S$GLB,, | Performed by: FAMILY MEDICINE

## 2019-11-07 PROCEDURE — G0009 ADMIN PNEUMOCOCCAL VACCINE: HCPCS | Mod: S$GLB,,, | Performed by: FAMILY MEDICINE

## 2019-11-07 PROCEDURE — 90732 PPSV23 VACC 2 YRS+ SUBQ/IM: CPT | Mod: S$GLB,,, | Performed by: FAMILY MEDICINE

## 2019-11-07 PROCEDURE — 1101F PR PT FALLS ASSESS DOC 0-1 FALLS W/OUT INJ PAST YR: ICD-10-PCS | Mod: S$GLB,,, | Performed by: FAMILY MEDICINE

## 2019-11-07 PROCEDURE — 99214 PR OFFICE/OUTPT VISIT, EST, LEVL IV, 30-39 MIN: ICD-10-PCS | Mod: 25,S$GLB,, | Performed by: FAMILY MEDICINE

## 2019-11-07 PROCEDURE — 90732 PNEUMOCOCCAL POLYSACCHARIDE VACCINE 23-VALENT =>2YO SQ IM: ICD-10-PCS | Mod: S$GLB,,, | Performed by: FAMILY MEDICINE

## 2019-11-07 RX ORDER — LISINOPRIL 20 MG/1
20 TABLET ORAL DAILY
Qty: 90 TABLET | Refills: 1 | Status: SHIPPED | OUTPATIENT
Start: 2019-11-07 | End: 2020-04-30 | Stop reason: SDUPTHER

## 2019-11-07 RX ORDER — ESTRADIOL 0.1 MG/G
2 CREAM VAGINAL
Qty: 4 G | Refills: 5 | Status: SHIPPED | OUTPATIENT
Start: 2019-11-07 | End: 2020-11-12 | Stop reason: SDUPTHER

## 2019-11-07 RX ORDER — CHOLECALCIFEROL (VITAMIN D3) 50 MCG
TABLET ORAL
COMMUNITY
Start: 2019-02-08

## 2019-11-07 RX ORDER — LOVASTATIN 20 MG/1
20 TABLET ORAL NIGHTLY
Qty: 90 TABLET | Refills: 1 | Status: SHIPPED | OUTPATIENT
Start: 2019-11-07 | End: 2020-04-30 | Stop reason: SDUPTHER

## 2019-11-07 RX ORDER — MIRTAZAPINE 15 MG/1
15 TABLET, FILM COATED ORAL NIGHTLY
Qty: 90 TABLET | Refills: 1 | Status: SHIPPED | OUTPATIENT
Start: 2019-11-07 | End: 2020-04-30 | Stop reason: SDUPTHER

## 2019-11-07 NOTE — PROGRESS NOTES
SUBJECTIVE:    Patient ID: Tanika Olivera is a 68 y.o. female.    Chief Complaint: Follow-up (SW)    This 68-year-old female has been retired for several years she jogs 3 times a week for exercise she does weight training several days a week as well.  She denies joint pains chest pain shortness of breath.  She had a DEXA scan recently that showed a T-score -2.4 osteopenia she is now taking vitamin-D and calcium.  She is taking her lovastatin for cholesterol her last cholesterol was at goal in July 2019.  Mammogram current as of 2019.  Cologuard was normal this year..      No visits with results within 6 Month(s) from this visit.   Latest known visit with results is:   Lab Visit on 11/07/2018   Component Date Value Ref Range Status    HCV Log 11/07/2018 <1.08  <1.08 Log (10) IU/mL Final    HCV, Qualitative 11/07/2018 Not detected  Not detected IU/mL Final    HCV RNA Quant PCR 11/07/2018 <12  <12 IU/mL Final       Past Medical History:   Diagnosis Date    History of hepatitis C, s/p successful Rx w/ SVR24 (cure) - 11/2018 2/23/2017    S/p epclusa w/ SVR    HTN (hypertension)     Hyperlipidemia     Osteoporosis      Past Surgical History:   Procedure Laterality Date    TOTAL ABDOMINAL HYSTERECTOMY W/ BILATERAL SALPINGOOPHORECTOMY       Family History   Problem Relation Age of Onset    Hypertension Mother     Hyperlipidemia Mother     Stroke Father        Marital Status:   Alcohol History:  reports that she does not drink alcohol.  Tobacco History:  reports that she has never smoked. She has never used smokeless tobacco.  Drug History:  reports that she does not use drugs.    Review of patient's allergies indicates:  No Known Allergies    Current Outpatient Medications:     calcium carbonate (OS-KYLAH) 500 mg calcium (1,250 mg) tablet, Take 1 tablet by mouth once daily., Disp: , Rfl:     cholecalciferol, vitamin D3, (VITAMIN D3) 2,000 unit Tab, 1 tablet, Disp: , Rfl:     estradiol (ESTRACE) 0.01 %  (0.1 mg/gram) vaginal cream, Place 2 g vaginally every 7 days., Disp: 4 g, Rfl: 5    fish oil-omega-3 fatty acids 300-1,000 mg capsule, Take 2 g by mouth once daily., Disp: , Rfl:     FLUZONE HIGH-DOSE 2019-20, PF, 180 mcg/0.5 mL Syrg, PHARMACIST ADMINISTERED IMMUNIZATION ADMINISTERED AT TIME OF DISPENSING, Disp: , Rfl: 0    lisinopril (PRINIVIL,ZESTRIL) 20 MG tablet, Take 1 tablet (20 mg total) by mouth once daily., Disp: 90 tablet, Rfl: 1    lovastatin (MEVACOR) 20 MG tablet, Take 1 tablet (20 mg total) by mouth every evening., Disp: 90 tablet, Rfl: 1    mirtazapine (REMERON) 15 MG tablet, Take 1 tablet (15 mg total) by mouth every evening., Disp: 90 tablet, Rfl: 1    MULTIVITAMIN (MULTIPLE VITAMIN ORAL), Take 1 tablet by mouth once daily., Disp: , Rfl:     Review of Systems   Constitutional: Negative for appetite change, chills, fatigue, fever and unexpected weight change.   HENT: Negative for congestion, ear pain, sinus pain, sore throat and trouble swallowing.    Eyes: Negative for pain, discharge and visual disturbance.   Respiratory: Negative for apnea, cough, shortness of breath and wheezing.    Cardiovascular: Negative for chest pain, palpitations and leg swelling.   Gastrointestinal: Negative for abdominal pain, blood in stool, constipation, diarrhea, nausea and vomiting.   Endocrine: Negative for heat intolerance, polydipsia and polyuria.   Genitourinary: Negative for difficulty urinating, dyspareunia, dysuria, frequency, hematuria and menstrual problem.   Musculoskeletal: Negative for arthralgias, back pain, gait problem, joint swelling and myalgias.   Allergic/Immunologic: Negative for environmental allergies, food allergies and immunocompromised state.   Neurological: Negative for dizziness, tremors, seizures, numbness and headaches.   Psychiatric/Behavioral: Negative for behavioral problems, confusion, hallucinations and suicidal ideas. The patient is not nervous/anxious.           Objective:     "  Vitals:    11/07/19 1407   BP: 138/84   Pulse: 60   Weight: 50.8 kg (112 lb)   Height: 4' 11.25" (1.505 m)     Body mass index is 22.43 kg/m².  Physical Exam   Constitutional: She is oriented to person, place, and time. She appears well-developed and well-nourished.   HENT:   Head: Normocephalic and atraumatic.   Right Ear: External ear normal.   Left Ear: External ear normal.   Nose: Nose normal.   Mouth/Throat: Oropharynx is clear and moist.   Eyes: Pupils are equal, round, and reactive to light. EOM are normal.   Neck: Normal range of motion. Neck supple. Carotid bruit is not present. No thyromegaly present.   Cardiovascular: Normal rate, regular rhythm, normal heart sounds and intact distal pulses.   No murmur heard.  Pulmonary/Chest: Effort normal and breath sounds normal. She has no wheezes. She has no rales.   Abdominal: Soft. Bowel sounds are normal. She exhibits no distension. There is no hepatosplenomegaly. There is no tenderness.   Musculoskeletal: Normal range of motion. She exhibits no tenderness or deformity.        Lumbar back: Normal. She exhibits no pain and no spasm.   Bends 90 degrees at  waist   Lymphadenopathy:     She has no cervical adenopathy.   Neurological: She is alert and oriented to person, place, and time. No cranial nerve deficit. Coordination normal.   Skin: Skin is warm and dry. No rash noted.   Psychiatric: She has a normal mood and affect. Her behavior is normal. Judgment and thought content normal.   Nursing note and vitals reviewed.        Assessment:       1. Hypertension, unspecified type    2. Hyperlipidemia, unspecified hyperlipidemia type    3. Primary insomnia    4. Anxiety disorder, unspecified type    5. Chronic hepatitis C without hepatic coma    6. Atrophic vaginitis    7. Need for vaccination against Streptococcus pneumoniae         Plan:       Hypertension, unspecified type  -     lisinopril (PRINIVIL,ZESTRIL) 20 MG tablet; Take 1 tablet (20 mg total) by mouth once " daily.  Dispense: 90 tablet; Refill: 1  Well controlled with lisinopril  Hyperlipidemia, unspecified hyperlipidemia type  -     lovastatin (MEVACOR) 20 MG tablet; Take 1 tablet (20 mg total) by mouth every evening.  Dispense: 90 tablet; Refill: 1  Cholesterol was at goal in July on lovastatin.  Repeat labs in 6 months  Primary insomnia  Continue mirtazapine  Anxiety disorder, unspecified type  -     mirtazapine (REMERON) 15 MG tablet; Take 1 tablet (15 mg total) by mouth every evening.  Dispense: 90 tablet; Refill: 1    Chronic hepatitis C without hepatic coma  This problem is now resolved after current treatment  Atrophic vaginitis  -     estradiol (ESTRACE) 0.01 % (0.1 mg/gram) vaginal cream; Place 2 g vaginally every 7 days.  Dispense: 4 g; Refill: 5    Need for vaccination against Streptococcus pneumoniae  -     Pneumococcal Polysaccharide Vaccine (23 Valent) (SQ/IM)  Pneumovax today.    No follow-ups on file.

## 2019-11-08 ENCOUNTER — LAB VISIT (OUTPATIENT)
Dept: LAB | Facility: HOSPITAL | Age: 69
End: 2019-11-08
Attending: PHYSICIAN ASSISTANT
Payer: MEDICARE

## 2019-11-08 DIAGNOSIS — Z86.19 HISTORY OF HEPATITIS C: ICD-10-CM

## 2019-11-08 PROCEDURE — 36415 COLL VENOUS BLD VENIPUNCTURE: CPT | Mod: PO

## 2019-11-08 PROCEDURE — 87522 HEPATITIS C REVRS TRNSCRPJ: CPT

## 2020-04-29 ENCOUNTER — TELEPHONE (OUTPATIENT)
Dept: FAMILY MEDICINE | Facility: CLINIC | Age: 70
End: 2020-04-29

## 2020-04-29 NOTE — TELEPHONE ENCOUNTER
From overdue results-lab due prior to ov if comfortable. States she is ok with getting lab and will get drawn prior to ov. Told her to go to [address] [facility]. Also, states she has MyChart. Informed her of VV option. Told her nurse would be calling once appt gets closer.

## 2020-04-30 ENCOUNTER — TELEPHONE (OUTPATIENT)
Dept: FAMILY MEDICINE | Facility: CLINIC | Age: 70
End: 2020-04-30

## 2020-05-02 LAB
ALBUMIN SERPL-MCNC: 4.7 G/DL (ref 3.6–5.1)
ALBUMIN/GLOB SERPL: 1.5 (CALC) (ref 1–2.5)
ALP SERPL-CCNC: 51 U/L (ref 37–153)
ALT SERPL-CCNC: 15 U/L (ref 6–29)
AST SERPL-CCNC: 25 U/L (ref 10–35)
BASOPHILS # BLD AUTO: 11 CELLS/UL (ref 0–200)
BASOPHILS NFR BLD AUTO: 0.2 %
BILIRUB SERPL-MCNC: 0.6 MG/DL (ref 0.2–1.2)
BUN SERPL-MCNC: 15 MG/DL (ref 7–25)
BUN/CREAT SERPL: ABNORMAL (CALC) (ref 6–22)
CALCIUM SERPL-MCNC: 10 MG/DL (ref 8.6–10.4)
CHLORIDE SERPL-SCNC: 101 MMOL/L (ref 98–110)
CHOLEST SERPL-MCNC: 188 MG/DL
CHOLEST/HDLC SERPL: 2 (CALC)
CO2 SERPL-SCNC: 31 MMOL/L (ref 20–32)
CREAT SERPL-MCNC: 0.84 MG/DL (ref 0.5–0.99)
EOSINOPHIL # BLD AUTO: 101 CELLS/UL (ref 15–500)
EOSINOPHIL NFR BLD AUTO: 1.8 %
ERYTHROCYTE [DISTWIDTH] IN BLOOD BY AUTOMATED COUNT: 13.1 % (ref 11–15)
GFRSERPLBLD MDRD-ARVRAT: 72 ML/MIN/1.73M2
GLOBULIN SER CALC-MCNC: 3.2 G/DL (CALC) (ref 1.9–3.7)
GLUCOSE SERPL-MCNC: 109 MG/DL (ref 65–99)
HCT VFR BLD AUTO: 40.8 % (ref 35–45)
HDLC SERPL-MCNC: 93 MG/DL
HGB BLD-MCNC: 13.6 G/DL (ref 11.7–15.5)
LDLC SERPL CALC-MCNC: 76 MG/DL (CALC)
LYMPHOCYTES # BLD AUTO: 2240 CELLS/UL (ref 850–3900)
LYMPHOCYTES NFR BLD AUTO: 40 %
MCH RBC QN AUTO: 30.4 PG (ref 27–33)
MCHC RBC AUTO-ENTMCNC: 33.3 G/DL (ref 32–36)
MCV RBC AUTO: 91.1 FL (ref 80–100)
MONOCYTES # BLD AUTO: 543 CELLS/UL (ref 200–950)
MONOCYTES NFR BLD AUTO: 9.7 %
NEUTROPHILS # BLD AUTO: 2705 CELLS/UL (ref 1500–7800)
NEUTROPHILS NFR BLD AUTO: 48.3 %
NONHDLC SERPL-MCNC: 95 MG/DL (CALC)
PLATELET # BLD AUTO: 286 THOUSAND/UL (ref 140–400)
PMV BLD REES-ECKER: 9.8 FL (ref 7.5–12.5)
POTASSIUM SERPL-SCNC: 4.6 MMOL/L (ref 3.5–5.3)
PROT SERPL-MCNC: 7.9 G/DL (ref 6.1–8.1)
RBC # BLD AUTO: 4.48 MILLION/UL (ref 3.8–5.1)
SODIUM SERPL-SCNC: 139 MMOL/L (ref 135–146)
TRIGL SERPL-MCNC: 105 MG/DL
TSH SERPL-ACNC: 3.42 MIU/L (ref 0.4–4.5)
WBC # BLD AUTO: 5.6 THOUSAND/UL (ref 3.8–10.8)

## 2020-05-07 ENCOUNTER — OFFICE VISIT (OUTPATIENT)
Dept: FAMILY MEDICINE | Facility: CLINIC | Age: 70
End: 2020-05-07
Payer: MEDICARE

## 2020-05-07 VITALS
SYSTOLIC BLOOD PRESSURE: 116 MMHG | BODY MASS INDEX: 22.43 KG/M2 | WEIGHT: 112 LBS | TEMPERATURE: 98 F | HEART RATE: 58 BPM | DIASTOLIC BLOOD PRESSURE: 63 MMHG

## 2020-05-07 DIAGNOSIS — E78.5 HYPERLIPIDEMIA, UNSPECIFIED HYPERLIPIDEMIA TYPE: ICD-10-CM

## 2020-05-07 DIAGNOSIS — I10 HYPERTENSION, UNSPECIFIED TYPE: Primary | ICD-10-CM

## 2020-05-07 DIAGNOSIS — Z12.31 OTHER SCREENING MAMMOGRAM: ICD-10-CM

## 2020-05-07 DIAGNOSIS — F51.01 PRIMARY INSOMNIA: ICD-10-CM

## 2020-05-07 DIAGNOSIS — F41.9 ANXIETY DISORDER, UNSPECIFIED TYPE: ICD-10-CM

## 2020-05-07 PROCEDURE — 3074F SYST BP LT 130 MM HG: CPT | Mod: 95,,, | Performed by: FAMILY MEDICINE

## 2020-05-07 PROCEDURE — 3078F DIAST BP <80 MM HG: CPT | Mod: 95,,, | Performed by: FAMILY MEDICINE

## 2020-05-07 PROCEDURE — 1159F MED LIST DOCD IN RCRD: CPT | Mod: 95,,, | Performed by: FAMILY MEDICINE

## 2020-05-07 PROCEDURE — 99212 PR OFFICE/OUTPT VISIT, EST, LEVL II, 10-19 MIN: ICD-10-PCS | Mod: 95,,, | Performed by: FAMILY MEDICINE

## 2020-05-07 PROCEDURE — 1101F PR PT FALLS ASSESS DOC 0-1 FALLS W/OUT INJ PAST YR: ICD-10-PCS | Mod: 95,,, | Performed by: FAMILY MEDICINE

## 2020-05-07 PROCEDURE — 3078F PR MOST RECENT DIASTOLIC BLOOD PRESSURE < 80 MM HG: ICD-10-PCS | Mod: 95,,, | Performed by: FAMILY MEDICINE

## 2020-05-07 PROCEDURE — 1159F PR MEDICATION LIST DOCUMENTED IN MEDICAL RECORD: ICD-10-PCS | Mod: 95,,, | Performed by: FAMILY MEDICINE

## 2020-05-07 PROCEDURE — 99212 OFFICE O/P EST SF 10 MIN: CPT | Mod: 95,,, | Performed by: FAMILY MEDICINE

## 2020-05-07 PROCEDURE — 3074F PR MOST RECENT SYSTOLIC BLOOD PRESSURE < 130 MM HG: ICD-10-PCS | Mod: 95,,, | Performed by: FAMILY MEDICINE

## 2020-05-07 PROCEDURE — 1101F PT FALLS ASSESS-DOCD LE1/YR: CPT | Mod: 95,,, | Performed by: FAMILY MEDICINE

## 2020-05-07 RX ORDER — MIRTAZAPINE 15 MG/1
15 TABLET, FILM COATED ORAL NIGHTLY
Qty: 90 TABLET | Refills: 1 | Status: SHIPPED | OUTPATIENT
Start: 2020-05-07 | End: 2020-11-12 | Stop reason: SDUPTHER

## 2020-05-07 RX ORDER — LISINOPRIL 20 MG/1
20 TABLET ORAL DAILY
Qty: 90 TABLET | Refills: 1 | Status: SHIPPED | OUTPATIENT
Start: 2020-05-07 | End: 2020-11-12 | Stop reason: SDUPTHER

## 2020-05-07 RX ORDER — LOVASTATIN 20 MG/1
20 TABLET ORAL NIGHTLY
Qty: 90 TABLET | Refills: 1 | Status: SHIPPED | OUTPATIENT
Start: 2020-05-07 | End: 2020-11-12 | Stop reason: SDUPTHER

## 2020-05-07 NOTE — PROGRESS NOTES
Subjective:        The chief complaint leading to consultation is:  Six-month checkup  The patient location is:  Home  Visit type: Virtual visit with synchronous audio/video or audio only  This was a video visit in lieu of in-person visit due to the coronavirus emergency. Patient acknowledged and consented to the video visit encounter.     This is a telemedicine visit for 69-year-old female for six-month checkup.  She has been staying at home during the COVID-19 virus crisis.  She is keeping active by jogging 3 miles every other day and doing some light dumbbell weightlifting to be for biceps tone.  She also enjoys mowing the lawn and doing light garden work.  She is doing her own grocery shopping and driving.  She denies any shortness of breath cough or fever compatible with the COVID virus.      Past Surgical History:   Procedure Laterality Date    TOTAL ABDOMINAL HYSTERECTOMY W/ BILATERAL SALPINGOOPHORECTOMY       Past Medical History:   Diagnosis Date    History of hepatitis C, s/p successful Rx w/ SVR24 (cure) - 11/2018 2/23/2017    S/p epclusa w/ SVR    HTN (hypertension)     Hyperlipidemia     Osteoporosis      Family History   Problem Relation Age of Onset    Hypertension Mother     Hyperlipidemia Mother     Stroke Father         Social History:   Marital Status:   Alcohol History:  reports that she does not drink alcohol.  Tobacco History:  reports that she has never smoked. She has never used smokeless tobacco.  Drug History:  reports that she does not use drugs.    Review of patient's allergies indicates:  No Known Allergies    Current Outpatient Medications   Medication Sig Dispense Refill    calcium carbonate (OS-KYLAH) 500 mg calcium (1,250 mg) tablet Take 1 tablet by mouth once daily.      cholecalciferol, vitamin D3, (VITAMIN D3) 2,000 unit Tab 1 tablet      estradiol (ESTRACE) 0.01 % (0.1 mg/gram) vaginal cream Place 2 g vaginally every 7 days. 4 g 5    fish oil-omega-3 fatty  acids 300-1,000 mg capsule Take 2 g by mouth once daily.      lisinopriL (PRINIVIL,ZESTRIL) 20 MG tablet Take 1 tablet (20 mg total) by mouth once daily. 90 tablet 1    lovastatin (MEVACOR) 20 MG tablet Take 1 tablet (20 mg total) by mouth every evening. 90 tablet 1    mirtazapine (REMERON) 15 MG tablet Take 1 tablet (15 mg total) by mouth every evening. 90 tablet 1    MULTIVITAMIN (MULTIPLE VITAMIN ORAL) Take 1 tablet by mouth once daily.       No current facility-administered medications for this visit.        Review of Systems   Constitutional: Negative for appetite change, chills, fatigue, fever and unexpected weight change.   HENT: Negative for congestion, ear pain, sinus pain, sore throat and trouble swallowing.    Eyes: Negative for pain, discharge and visual disturbance.   Respiratory: Negative for apnea, cough, shortness of breath and wheezing.    Cardiovascular: Negative for chest pain, palpitations and leg swelling.   Gastrointestinal: Negative for abdominal pain, blood in stool, constipation, diarrhea, nausea and vomiting.   Endocrine: Negative for heat intolerance, polydipsia and polyuria.   Genitourinary: Negative for difficulty urinating, dyspareunia, dysuria, frequency, hematuria and menstrual problem.   Musculoskeletal: Negative for arthralgias, back pain, gait problem, joint swelling and myalgias.   Allergic/Immunologic: Negative for environmental allergies, food allergies and immunocompromised state.   Neurological: Negative for dizziness, tremors, seizures, numbness and headaches.   Psychiatric/Behavioral: Negative for behavioral problems, confusion, hallucinations and suicidal ideas. The patient is not nervous/anxious.          Objective:        Physical Exam:   Physical Exam         Assessment:       1. Hypertension, unspecified type    2. Hyperlipidemia, unspecified hyperlipidemia type    3. Anxiety disorder, unspecified type    4. Other screening mammogram    5. Primary insomnia       Plan:   Hypertension, unspecified type  -     lisinopriL (PRINIVIL,ZESTRIL) 20 MG tablet; Take 1 tablet (20 mg total) by mouth once daily.  Dispense: 90 tablet; Refill: 1  Blood pressure very well controlled on current regimen, continue lisinopril  Hyperlipidemia, unspecified hyperlipidemia type  -     lovastatin (MEVACOR) 20 MG tablet; Take 1 tablet (20 mg total) by mouth every evening.  Dispense: 90 tablet; Refill: 1  -     Lipid Panel; Future; Expected date: 05/07/2020  -     Comprehensive metabolic panel; Future; Expected date: 05/07/2020  Lipid panel due in 6 months  Anxiety disorder, unspecified type  -     mirtazapine (REMERON) 15 MG tablet; Take 1 tablet (15 mg total) by mouth every evening.  Dispense: 90 tablet; Refill: 1  Continue mirtazapine for sleep and anxiety  Other screening mammogram  -     Mammo Digital Screening Bilat w/ Joseph; Future; Expected date: 05/07/2020  Mammogram ordered  Primary insomnia      No follow-ups on file.    Total time spent with patient:  20 min    Each patient to whom he or she provides medical services by telemedicine is:  (1) informed of the relationship between the physician and patient and the respective role of any other health care provider with respect to management of the patient; and (2) notified that he or she may decline to receive medical services by telemedicine and may withdraw from such care at any time.    This note was created using [employer] voice recognition software that occasionally misinterprets phrases or words.

## 2020-05-12 ENCOUNTER — TELEPHONE (OUTPATIENT)
Dept: FAMILY MEDICINE | Facility: CLINIC | Age: 70
End: 2020-05-12

## 2020-05-12 NOTE — TELEPHONE ENCOUNTER
----- Message from Anna Amaya MA sent at 5/12/2020  8:21 AM CDT -----      ----- Message -----  From: Esdras Green MD  Sent: 5/7/2020   8:53 PM CDT  To: Anna Amaya MA    Please call patient set up office visit and labs in 6 months.

## 2020-07-06 ENCOUNTER — TELEPHONE (OUTPATIENT)
Dept: FAMILY MEDICINE | Facility: CLINIC | Age: 70
End: 2020-07-06

## 2020-07-06 NOTE — TELEPHONE ENCOUNTER
----- Message from Esdras Green MD sent at 7/5/2020  9:22 PM CDT -----  Call patient.  Mammogram showed no evidence of malignancy .  Repeat mammogram 1 year  ----- Message -----  From: Anna Amaay MA  Sent: 6/17/2020   8:31 AM CDT  To: Esdras Green MD      ----- Message -----  From: Germaine Chen  Sent: 6/17/2020   8:18 AM CDT  To: Esdras Green Staff    RADIOLOGY, D.I.SGeoff CRYSTAL, 6/15/20

## 2020-11-02 ENCOUNTER — TELEPHONE (OUTPATIENT)
Dept: FAMILY MEDICINE | Facility: CLINIC | Age: 70
End: 2020-11-02

## 2020-11-12 ENCOUNTER — OFFICE VISIT (OUTPATIENT)
Dept: FAMILY MEDICINE | Facility: CLINIC | Age: 70
End: 2020-11-12
Payer: MEDICARE

## 2020-11-12 VITALS
BODY MASS INDEX: 21.77 KG/M2 | SYSTOLIC BLOOD PRESSURE: 126 MMHG | WEIGHT: 108 LBS | DIASTOLIC BLOOD PRESSURE: 78 MMHG | HEART RATE: 64 BPM | HEIGHT: 59 IN

## 2020-11-12 DIAGNOSIS — I10 HYPERTENSION, UNSPECIFIED TYPE: Primary | ICD-10-CM

## 2020-11-12 DIAGNOSIS — E78.5 HYPERLIPIDEMIA, UNSPECIFIED HYPERLIPIDEMIA TYPE: ICD-10-CM

## 2020-11-12 DIAGNOSIS — F51.01 PRIMARY INSOMNIA: ICD-10-CM

## 2020-11-12 DIAGNOSIS — N95.2 ATROPHIC VAGINITIS: ICD-10-CM

## 2020-11-12 DIAGNOSIS — F41.9 ANXIETY DISORDER, UNSPECIFIED TYPE: ICD-10-CM

## 2020-11-12 PROCEDURE — 3074F PR MOST RECENT SYSTOLIC BLOOD PRESSURE < 130 MM HG: ICD-10-PCS | Mod: S$GLB,,, | Performed by: FAMILY MEDICINE

## 2020-11-12 PROCEDURE — 3078F PR MOST RECENT DIASTOLIC BLOOD PRESSURE < 80 MM HG: ICD-10-PCS | Mod: S$GLB,,, | Performed by: FAMILY MEDICINE

## 2020-11-12 PROCEDURE — 1159F PR MEDICATION LIST DOCUMENTED IN MEDICAL RECORD: ICD-10-PCS | Mod: S$GLB,,, | Performed by: FAMILY MEDICINE

## 2020-11-12 PROCEDURE — 3074F SYST BP LT 130 MM HG: CPT | Mod: S$GLB,,, | Performed by: FAMILY MEDICINE

## 2020-11-12 PROCEDURE — 99214 PR OFFICE/OUTPT VISIT, EST, LEVL IV, 30-39 MIN: ICD-10-PCS | Mod: S$GLB,,, | Performed by: FAMILY MEDICINE

## 2020-11-12 PROCEDURE — 3008F BODY MASS INDEX DOCD: CPT | Mod: S$GLB,,, | Performed by: FAMILY MEDICINE

## 2020-11-12 PROCEDURE — 3008F PR BODY MASS INDEX (BMI) DOCUMENTED: ICD-10-PCS | Mod: S$GLB,,, | Performed by: FAMILY MEDICINE

## 2020-11-12 PROCEDURE — 3078F DIAST BP <80 MM HG: CPT | Mod: S$GLB,,, | Performed by: FAMILY MEDICINE

## 2020-11-12 PROCEDURE — 99214 OFFICE O/P EST MOD 30 MIN: CPT | Mod: S$GLB,,, | Performed by: FAMILY MEDICINE

## 2020-11-12 PROCEDURE — 1159F MED LIST DOCD IN RCRD: CPT | Mod: S$GLB,,, | Performed by: FAMILY MEDICINE

## 2020-11-12 RX ORDER — MIRTAZAPINE 15 MG/1
15 TABLET, FILM COATED ORAL NIGHTLY
Qty: 90 TABLET | Refills: 1 | Status: SHIPPED | OUTPATIENT
Start: 2020-11-12 | End: 2021-05-18 | Stop reason: SDUPTHER

## 2020-11-12 RX ORDER — LOVASTATIN 20 MG/1
20 TABLET ORAL NIGHTLY
Qty: 90 TABLET | Refills: 1 | Status: SHIPPED | OUTPATIENT
Start: 2020-11-12 | End: 2021-05-18 | Stop reason: SDUPTHER

## 2020-11-12 RX ORDER — ESTRADIOL 0.1 MG/G
2 CREAM VAGINAL
Qty: 4 G | Refills: 5 | Status: SHIPPED | OUTPATIENT
Start: 2020-11-12 | End: 2021-11-22 | Stop reason: SDUPTHER

## 2020-11-12 RX ORDER — LISINOPRIL 20 MG/1
20 TABLET ORAL DAILY
Qty: 90 TABLET | Refills: 1 | Status: SHIPPED | OUTPATIENT
Start: 2020-11-12 | End: 2021-05-18 | Stop reason: SDUPTHER

## 2021-03-02 LAB
ALBUMIN SERPL-MCNC: 4.7 G/DL (ref 3.6–5.1)
ALBUMIN/GLOB SERPL: 1.5 (CALC) (ref 1–2.5)
ALP SERPL-CCNC: 43 U/L (ref 37–153)
ALT SERPL-CCNC: 17 U/L (ref 6–29)
AST SERPL-CCNC: 30 U/L (ref 10–35)
BILIRUB SERPL-MCNC: 0.5 MG/DL (ref 0.2–1.2)
BUN SERPL-MCNC: 12 MG/DL (ref 7–25)
BUN/CREAT SERPL: NORMAL (CALC) (ref 6–22)
CALCIUM SERPL-MCNC: 10.1 MG/DL (ref 8.6–10.4)
CHLORIDE SERPL-SCNC: 100 MMOL/L (ref 98–110)
CHOLEST SERPL-MCNC: 196 MG/DL
CHOLEST/HDLC SERPL: 2 (CALC)
CO2 SERPL-SCNC: 32 MMOL/L (ref 20–32)
CREAT SERPL-MCNC: 0.75 MG/DL (ref 0.5–0.99)
GFRSERPLBLD MDRD-ARVRAT: 82 ML/MIN/1.73M2
GLOBULIN SER CALC-MCNC: 3.2 G/DL (CALC) (ref 1.9–3.7)
GLUCOSE SERPL-MCNC: 95 MG/DL (ref 65–99)
HDLC SERPL-MCNC: 99 MG/DL
LDLC SERPL CALC-MCNC: 79 MG/DL (CALC)
NONHDLC SERPL-MCNC: 97 MG/DL (CALC)
POTASSIUM SERPL-SCNC: 4.3 MMOL/L (ref 3.5–5.3)
PROT SERPL-MCNC: 7.9 G/DL (ref 6.1–8.1)
SODIUM SERPL-SCNC: 138 MMOL/L (ref 135–146)
TRIGL SERPL-MCNC: 92 MG/DL

## 2021-03-04 ENCOUNTER — TELEPHONE (OUTPATIENT)
Dept: FAMILY MEDICINE | Facility: CLINIC | Age: 71
End: 2021-03-04

## 2021-03-05 ENCOUNTER — IMMUNIZATION (OUTPATIENT)
Dept: FAMILY MEDICINE | Facility: CLINIC | Age: 71
End: 2021-03-05
Payer: MEDICARE

## 2021-03-05 DIAGNOSIS — Z23 NEED FOR VACCINATION: Primary | ICD-10-CM

## 2021-03-05 PROCEDURE — 91300 COVID-19, MRNA, LNP-S, PF, 30 MCG/0.3 ML DOSE VACCINE: ICD-10-PCS | Mod: ,,, | Performed by: FAMILY MEDICINE

## 2021-03-05 PROCEDURE — 0001A COVID-19, MRNA, LNP-S, PF, 30 MCG/0.3 ML DOSE VACCINE: ICD-10-PCS | Mod: CV19,,, | Performed by: FAMILY MEDICINE

## 2021-03-05 PROCEDURE — 91300 COVID-19, MRNA, LNP-S, PF, 30 MCG/0.3 ML DOSE VACCINE: CPT | Mod: ,,, | Performed by: FAMILY MEDICINE

## 2021-03-05 PROCEDURE — 0001A COVID-19, MRNA, LNP-S, PF, 30 MCG/0.3 ML DOSE VACCINE: CPT | Mod: CV19,,, | Performed by: FAMILY MEDICINE

## 2021-03-26 ENCOUNTER — IMMUNIZATION (OUTPATIENT)
Dept: FAMILY MEDICINE | Facility: CLINIC | Age: 71
End: 2021-03-26
Payer: MEDICARE

## 2021-03-26 DIAGNOSIS — Z23 NEED FOR VACCINATION: Primary | ICD-10-CM

## 2021-03-26 PROCEDURE — 91300 COVID-19, MRNA, LNP-S, PF, 30 MCG/0.3 ML DOSE VACCINE: ICD-10-PCS | Mod: S$GLB,,, | Performed by: FAMILY MEDICINE

## 2021-03-26 PROCEDURE — 0002A COVID-19, MRNA, LNP-S, PF, 30 MCG/0.3 ML DOSE VACCINE: ICD-10-PCS | Mod: CV19,S$GLB,, | Performed by: FAMILY MEDICINE

## 2021-03-26 PROCEDURE — 0002A COVID-19, MRNA, LNP-S, PF, 30 MCG/0.3 ML DOSE VACCINE: CPT | Mod: CV19,S$GLB,, | Performed by: FAMILY MEDICINE

## 2021-03-26 PROCEDURE — 91300 COVID-19, MRNA, LNP-S, PF, 30 MCG/0.3 ML DOSE VACCINE: CPT | Mod: S$GLB,,, | Performed by: FAMILY MEDICINE

## 2021-05-18 ENCOUNTER — OFFICE VISIT (OUTPATIENT)
Dept: FAMILY MEDICINE | Facility: CLINIC | Age: 71
End: 2021-05-18
Payer: MEDICARE

## 2021-05-18 VITALS
WEIGHT: 113 LBS | SYSTOLIC BLOOD PRESSURE: 138 MMHG | HEIGHT: 59 IN | BODY MASS INDEX: 22.78 KG/M2 | DIASTOLIC BLOOD PRESSURE: 86 MMHG

## 2021-05-18 DIAGNOSIS — E78.2 MIXED HYPERLIPIDEMIA: ICD-10-CM

## 2021-05-18 DIAGNOSIS — Z01.419 WELL WOMAN EXAM: Primary | ICD-10-CM

## 2021-05-18 DIAGNOSIS — I10 ESSENTIAL HYPERTENSION: ICD-10-CM

## 2021-05-18 DIAGNOSIS — F51.01 PRIMARY INSOMNIA: ICD-10-CM

## 2021-05-18 DIAGNOSIS — Z12.31 SCREENING MAMMOGRAM, ENCOUNTER FOR: ICD-10-CM

## 2021-05-18 DIAGNOSIS — Z13.820 SCREENING FOR OSTEOPOROSIS: ICD-10-CM

## 2021-05-18 DIAGNOSIS — Z78.0 MENOPAUSE: ICD-10-CM

## 2021-05-18 PROCEDURE — 1159F MED LIST DOCD IN RCRD: CPT | Mod: S$GLB,,, | Performed by: NURSE PRACTITIONER

## 2021-05-18 PROCEDURE — 3288F PR FALLS RISK ASSESSMENT DOCUMENTED: ICD-10-PCS | Mod: S$GLB,,, | Performed by: NURSE PRACTITIONER

## 2021-05-18 PROCEDURE — 1159F PR MEDICATION LIST DOCUMENTED IN MEDICAL RECORD: ICD-10-PCS | Mod: S$GLB,,, | Performed by: NURSE PRACTITIONER

## 2021-05-18 PROCEDURE — 3008F PR BODY MASS INDEX (BMI) DOCUMENTED: ICD-10-PCS | Mod: S$GLB,,, | Performed by: NURSE PRACTITIONER

## 2021-05-18 PROCEDURE — 3288F FALL RISK ASSESSMENT DOCD: CPT | Mod: S$GLB,,, | Performed by: NURSE PRACTITIONER

## 2021-05-18 PROCEDURE — 3079F DIAST BP 80-89 MM HG: CPT | Mod: S$GLB,,, | Performed by: NURSE PRACTITIONER

## 2021-05-18 PROCEDURE — 99397 PER PM REEVAL EST PAT 65+ YR: CPT | Mod: S$GLB,,, | Performed by: NURSE PRACTITIONER

## 2021-05-18 PROCEDURE — 3008F BODY MASS INDEX DOCD: CPT | Mod: S$GLB,,, | Performed by: NURSE PRACTITIONER

## 2021-05-18 PROCEDURE — 1101F PR PT FALLS ASSESS DOC 0-1 FALLS W/OUT INJ PAST YR: ICD-10-PCS | Mod: S$GLB,,, | Performed by: NURSE PRACTITIONER

## 2021-05-18 PROCEDURE — 3075F SYST BP GE 130 - 139MM HG: CPT | Mod: S$GLB,,, | Performed by: NURSE PRACTITIONER

## 2021-05-18 PROCEDURE — 3079F PR MOST RECENT DIASTOLIC BLOOD PRESSURE 80-89 MM HG: ICD-10-PCS | Mod: S$GLB,,, | Performed by: NURSE PRACTITIONER

## 2021-05-18 PROCEDURE — 99397 PR PREVENTIVE VISIT,EST,65 & OVER: ICD-10-PCS | Mod: S$GLB,,, | Performed by: NURSE PRACTITIONER

## 2021-05-18 PROCEDURE — 1101F PT FALLS ASSESS-DOCD LE1/YR: CPT | Mod: S$GLB,,, | Performed by: NURSE PRACTITIONER

## 2021-05-18 PROCEDURE — 3075F PR MOST RECENT SYSTOLIC BLOOD PRESS GE 130-139MM HG: ICD-10-PCS | Mod: S$GLB,,, | Performed by: NURSE PRACTITIONER

## 2021-05-18 RX ORDER — MIRTAZAPINE 15 MG/1
15 TABLET, FILM COATED ORAL NIGHTLY
Qty: 90 TABLET | Refills: 1 | Status: SHIPPED | OUTPATIENT
Start: 2021-05-18 | End: 2021-11-22 | Stop reason: SDUPTHER

## 2021-05-18 RX ORDER — LISINOPRIL 20 MG/1
20 TABLET ORAL DAILY
Qty: 90 TABLET | Refills: 1 | Status: SHIPPED | OUTPATIENT
Start: 2021-05-18 | End: 2021-11-22 | Stop reason: SDUPTHER

## 2021-05-18 RX ORDER — LOVASTATIN 20 MG/1
20 TABLET ORAL NIGHTLY
Qty: 90 TABLET | Refills: 1 | Status: SHIPPED | OUTPATIENT
Start: 2021-05-18 | End: 2021-11-22 | Stop reason: SDUPTHER

## 2021-06-20 ENCOUNTER — TELEPHONE (OUTPATIENT)
Dept: FAMILY MEDICINE | Facility: CLINIC | Age: 71
End: 2021-06-20

## 2021-06-20 DIAGNOSIS — M85.80 OSTEOPENIA: Primary | ICD-10-CM

## 2021-06-21 RX ORDER — ALENDRONATE SODIUM 35 MG/1
35 TABLET ORAL
Qty: 4 TABLET | Refills: 11 | Status: SHIPPED | OUTPATIENT
Start: 2021-06-21 | End: 2021-11-22 | Stop reason: SDUPTHER

## 2021-11-22 ENCOUNTER — OFFICE VISIT (OUTPATIENT)
Dept: FAMILY MEDICINE | Facility: CLINIC | Age: 71
End: 2021-11-22
Payer: MEDICARE

## 2021-11-22 VITALS
HEIGHT: 59 IN | BODY MASS INDEX: 22.18 KG/M2 | HEART RATE: 76 BPM | SYSTOLIC BLOOD PRESSURE: 128 MMHG | DIASTOLIC BLOOD PRESSURE: 82 MMHG | WEIGHT: 110 LBS

## 2021-11-22 DIAGNOSIS — E78.2 MIXED HYPERLIPIDEMIA: Primary | ICD-10-CM

## 2021-11-22 DIAGNOSIS — F51.01 PRIMARY INSOMNIA: ICD-10-CM

## 2021-11-22 DIAGNOSIS — N95.2 ATROPHIC VAGINITIS: ICD-10-CM

## 2021-11-22 DIAGNOSIS — M85.88 OSTEOPENIA OF LUMBAR SPINE: ICD-10-CM

## 2021-11-22 DIAGNOSIS — I10 ESSENTIAL HYPERTENSION: ICD-10-CM

## 2021-11-22 PROCEDURE — 99214 PR OFFICE/OUTPT VISIT, EST, LEVL IV, 30-39 MIN: ICD-10-PCS | Mod: S$GLB,,, | Performed by: NURSE PRACTITIONER

## 2021-11-22 PROCEDURE — 4010F ACE/ARB THERAPY RXD/TAKEN: CPT | Mod: S$GLB,,, | Performed by: NURSE PRACTITIONER

## 2021-11-22 PROCEDURE — 4010F PR ACE/ARB THEARPY RXD/TAKEN: ICD-10-PCS | Mod: S$GLB,,, | Performed by: NURSE PRACTITIONER

## 2021-11-22 PROCEDURE — 99214 OFFICE O/P EST MOD 30 MIN: CPT | Mod: S$GLB,,, | Performed by: NURSE PRACTITIONER

## 2021-11-22 RX ORDER — LISINOPRIL 20 MG/1
20 TABLET ORAL DAILY
Qty: 90 TABLET | Refills: 1 | Status: SHIPPED | OUTPATIENT
Start: 2021-11-22 | End: 2022-05-23 | Stop reason: SDUPTHER

## 2021-11-22 RX ORDER — ALENDRONATE SODIUM 35 MG/1
35 TABLET ORAL
Qty: 12 TABLET | Refills: 1 | Status: SHIPPED | OUTPATIENT
Start: 2021-11-22 | End: 2022-05-23 | Stop reason: SDUPTHER

## 2021-11-22 RX ORDER — ESTRADIOL 0.1 MG/G
2 CREAM VAGINAL
Qty: 42.5 G | Refills: 5 | Status: SHIPPED | OUTPATIENT
Start: 2021-11-22 | End: 2022-11-23 | Stop reason: SDUPTHER

## 2021-11-22 RX ORDER — MIRTAZAPINE 15 MG/1
15 TABLET, FILM COATED ORAL NIGHTLY
Qty: 90 TABLET | Refills: 1 | Status: SHIPPED | OUTPATIENT
Start: 2021-11-22 | End: 2022-05-23 | Stop reason: SDUPTHER

## 2021-11-22 RX ORDER — LOVASTATIN 20 MG/1
20 TABLET ORAL NIGHTLY
Qty: 90 TABLET | Refills: 1 | Status: SHIPPED | OUTPATIENT
Start: 2021-11-22 | End: 2022-05-23 | Stop reason: SDUPTHER

## 2021-12-28 ENCOUNTER — LAB VISIT (OUTPATIENT)
Dept: PRIMARY CARE CLINIC | Facility: OTHER | Age: 71
End: 2021-12-28
Attending: INTERNAL MEDICINE
Payer: MEDICARE

## 2021-12-28 DIAGNOSIS — Z20.822 ENCOUNTER FOR LABORATORY TESTING FOR COVID-19 VIRUS: ICD-10-CM

## 2021-12-28 PROCEDURE — U0003 INFECTIOUS AGENT DETECTION BY NUCLEIC ACID (DNA OR RNA); SEVERE ACUTE RESPIRATORY SYNDROME CORONAVIRUS 2 (SARS-COV-2) (CORONAVIRUS DISEASE [COVID-19]), AMPLIFIED PROBE TECHNIQUE, MAKING USE OF HIGH THROUGHPUT TECHNOLOGIES AS DESCRIBED BY CMS-2020-01-R: HCPCS | Performed by: INTERNAL MEDICINE

## 2021-12-30 LAB
SARS-COV-2 RNA RESP QL NAA+PROBE: NOT DETECTED
SARS-COV-2- CYCLE NUMBER: NORMAL

## 2022-01-11 LAB
ALBUMIN SERPL-MCNC: 4.7 G/DL (ref 3.6–5.1)
ALBUMIN/CREAT UR: 6 MCG/MG CREAT
ALBUMIN/GLOB SERPL: 1.5 (CALC) (ref 1–2.5)
ALP SERPL-CCNC: 43 U/L (ref 37–153)
ALT SERPL-CCNC: 12 U/L (ref 6–29)
APPEARANCE UR: CLEAR
AST SERPL-CCNC: 23 U/L (ref 10–35)
BACTERIA #/AREA URNS HPF: NORMAL /HPF
BACTERIA UR CULT: NORMAL
BASOPHILS # BLD AUTO: 22 CELLS/UL (ref 0–200)
BASOPHILS NFR BLD AUTO: 0.5 %
BILIRUB SERPL-MCNC: 0.5 MG/DL (ref 0.2–1.2)
BILIRUB UR QL STRIP: NEGATIVE
BUN SERPL-MCNC: 18 MG/DL (ref 7–25)
BUN/CREAT SERPL: NORMAL (CALC) (ref 6–22)
CALCIUM SERPL-MCNC: 10.3 MG/DL (ref 8.6–10.4)
CHLORIDE SERPL-SCNC: 100 MMOL/L (ref 98–110)
CHOLEST SERPL-MCNC: 190 MG/DL
CHOLEST/HDLC SERPL: 2 (CALC)
CO2 SERPL-SCNC: 30 MMOL/L (ref 20–32)
COLOR UR: YELLOW
CREAT SERPL-MCNC: 0.79 MG/DL (ref 0.5–0.99)
CREAT UR-MCNC: 79 MG/DL (ref 20–275)
EOSINOPHIL # BLD AUTO: 142 CELLS/UL (ref 15–500)
EOSINOPHIL NFR BLD AUTO: 3.3 %
ERYTHROCYTE [DISTWIDTH] IN BLOOD BY AUTOMATED COUNT: 12.4 % (ref 11–15)
GLOBULIN SER CALC-MCNC: 3.2 G/DL (CALC) (ref 1.9–3.7)
GLUCOSE SERPL-MCNC: 97 MG/DL (ref 65–99)
GLUCOSE UR QL STRIP: NEGATIVE
HCT VFR BLD AUTO: 43.4 % (ref 35–45)
HDLC SERPL-MCNC: 95 MG/DL
HGB BLD-MCNC: 14.4 G/DL (ref 11.7–15.5)
HGB UR QL STRIP: NEGATIVE
HYALINE CASTS #/AREA URNS LPF: NORMAL /LPF
KETONES UR QL STRIP: NEGATIVE
LDLC SERPL CALC-MCNC: 76 MG/DL (CALC)
LEUKOCYTE ESTERASE UR QL STRIP: NEGATIVE
LYMPHOCYTES # BLD AUTO: 2000 CELLS/UL (ref 850–3900)
LYMPHOCYTES NFR BLD AUTO: 46.5 %
MCH RBC QN AUTO: 30.1 PG (ref 27–33)
MCHC RBC AUTO-ENTMCNC: 33.2 G/DL (ref 32–36)
MCV RBC AUTO: 90.6 FL (ref 80–100)
MICROALBUMIN UR-MCNC: 0.5 MG/DL
MONOCYTES # BLD AUTO: 499 CELLS/UL (ref 200–950)
MONOCYTES NFR BLD AUTO: 11.6 %
NEUTROPHILS # BLD AUTO: 1638 CELLS/UL (ref 1500–7800)
NEUTROPHILS NFR BLD AUTO: 38.1 %
NITRITE UR QL STRIP: NEGATIVE
NONHDLC SERPL-MCNC: 95 MG/DL (CALC)
PH UR STRIP: 8 [PH] (ref 5–8)
PLATELET # BLD AUTO: 305 THOUSAND/UL (ref 140–400)
PMV BLD REES-ECKER: 9.6 FL (ref 7.5–12.5)
POTASSIUM SERPL-SCNC: 4.4 MMOL/L (ref 3.5–5.3)
PROT SERPL-MCNC: 7.9 G/DL (ref 6.1–8.1)
PROT UR QL STRIP: NEGATIVE
RBC # BLD AUTO: 4.79 MILLION/UL (ref 3.8–5.1)
RBC #/AREA URNS HPF: NORMAL /HPF
SODIUM SERPL-SCNC: 138 MMOL/L (ref 135–146)
SP GR UR STRIP: 1.01 (ref 1–1.03)
SQUAMOUS #/AREA URNS HPF: NORMAL /HPF
TRIGL SERPL-MCNC: 100 MG/DL
TSH SERPL-ACNC: 2.33 MIU/L (ref 0.4–4.5)
WBC # BLD AUTO: 4.3 THOUSAND/UL (ref 3.8–10.8)
WBC #/AREA URNS HPF: NORMAL /HPF

## 2022-05-23 ENCOUNTER — OFFICE VISIT (OUTPATIENT)
Dept: FAMILY MEDICINE | Facility: CLINIC | Age: 72
End: 2022-05-23
Payer: MEDICARE

## 2022-05-23 VITALS
HEIGHT: 59 IN | BODY MASS INDEX: 22.34 KG/M2 | HEART RATE: 69 BPM | SYSTOLIC BLOOD PRESSURE: 138 MMHG | OXYGEN SATURATION: 97 % | WEIGHT: 110.81 LBS | DIASTOLIC BLOOD PRESSURE: 80 MMHG

## 2022-05-23 DIAGNOSIS — Z12.11 COLON CANCER SCREENING: ICD-10-CM

## 2022-05-23 DIAGNOSIS — M85.88 OSTEOPENIA OF LUMBAR SPINE: ICD-10-CM

## 2022-05-23 DIAGNOSIS — I10 ESSENTIAL HYPERTENSION: Primary | ICD-10-CM

## 2022-05-23 DIAGNOSIS — F51.01 PRIMARY INSOMNIA: ICD-10-CM

## 2022-05-23 DIAGNOSIS — Z12.31 ENCOUNTER FOR SCREENING MAMMOGRAM FOR MALIGNANT NEOPLASM OF BREAST: ICD-10-CM

## 2022-05-23 DIAGNOSIS — E78.2 MIXED HYPERLIPIDEMIA: ICD-10-CM

## 2022-05-23 PROCEDURE — 1101F PR PT FALLS ASSESS DOC 0-1 FALLS W/OUT INJ PAST YR: ICD-10-PCS | Mod: CPTII,S$GLB,, | Performed by: NURSE PRACTITIONER

## 2022-05-23 PROCEDURE — 3008F BODY MASS INDEX DOCD: CPT | Mod: CPTII,S$GLB,, | Performed by: NURSE PRACTITIONER

## 2022-05-23 PROCEDURE — 3075F SYST BP GE 130 - 139MM HG: CPT | Mod: CPTII,S$GLB,, | Performed by: NURSE PRACTITIONER

## 2022-05-23 PROCEDURE — 3288F FALL RISK ASSESSMENT DOCD: CPT | Mod: CPTII,S$GLB,, | Performed by: NURSE PRACTITIONER

## 2022-05-23 PROCEDURE — 1159F MED LIST DOCD IN RCRD: CPT | Mod: CPTII,S$GLB,, | Performed by: NURSE PRACTITIONER

## 2022-05-23 PROCEDURE — 1160F PR REVIEW ALL MEDS BY PRESCRIBER/CLIN PHARMACIST DOCUMENTED: ICD-10-PCS | Mod: CPTII,S$GLB,, | Performed by: NURSE PRACTITIONER

## 2022-05-23 PROCEDURE — 3075F PR MOST RECENT SYSTOLIC BLOOD PRESS GE 130-139MM HG: ICD-10-PCS | Mod: CPTII,S$GLB,, | Performed by: NURSE PRACTITIONER

## 2022-05-23 PROCEDURE — 1160F RVW MEDS BY RX/DR IN RCRD: CPT | Mod: CPTII,S$GLB,, | Performed by: NURSE PRACTITIONER

## 2022-05-23 PROCEDURE — 3066F NEPHROPATHY DOC TX: CPT | Mod: CPTII,S$GLB,, | Performed by: NURSE PRACTITIONER

## 2022-05-23 PROCEDURE — 99214 PR OFFICE/OUTPT VISIT, EST, LEVL IV, 30-39 MIN: ICD-10-PCS | Mod: S$GLB,,, | Performed by: NURSE PRACTITIONER

## 2022-05-23 PROCEDURE — 3061F PR NEG MICROALBUMINURIA RESULT DOCUMENTED/REVIEW: ICD-10-PCS | Mod: CPTII,S$GLB,, | Performed by: NURSE PRACTITIONER

## 2022-05-23 PROCEDURE — 3079F DIAST BP 80-89 MM HG: CPT | Mod: CPTII,S$GLB,, | Performed by: NURSE PRACTITIONER

## 2022-05-23 PROCEDURE — 1101F PT FALLS ASSESS-DOCD LE1/YR: CPT | Mod: CPTII,S$GLB,, | Performed by: NURSE PRACTITIONER

## 2022-05-23 PROCEDURE — 99214 OFFICE O/P EST MOD 30 MIN: CPT | Mod: S$GLB,,, | Performed by: NURSE PRACTITIONER

## 2022-05-23 PROCEDURE — 4010F ACE/ARB THERAPY RXD/TAKEN: CPT | Mod: CPTII,S$GLB,, | Performed by: NURSE PRACTITIONER

## 2022-05-23 PROCEDURE — 3008F PR BODY MASS INDEX (BMI) DOCUMENTED: ICD-10-PCS | Mod: CPTII,S$GLB,, | Performed by: NURSE PRACTITIONER

## 2022-05-23 PROCEDURE — 3288F PR FALLS RISK ASSESSMENT DOCUMENTED: ICD-10-PCS | Mod: CPTII,S$GLB,, | Performed by: NURSE PRACTITIONER

## 2022-05-23 PROCEDURE — 4010F PR ACE/ARB THEARPY RXD/TAKEN: ICD-10-PCS | Mod: CPTII,S$GLB,, | Performed by: NURSE PRACTITIONER

## 2022-05-23 PROCEDURE — 3079F PR MOST RECENT DIASTOLIC BLOOD PRESSURE 80-89 MM HG: ICD-10-PCS | Mod: CPTII,S$GLB,, | Performed by: NURSE PRACTITIONER

## 2022-05-23 PROCEDURE — 1159F PR MEDICATION LIST DOCUMENTED IN MEDICAL RECORD: ICD-10-PCS | Mod: CPTII,S$GLB,, | Performed by: NURSE PRACTITIONER

## 2022-05-23 PROCEDURE — 3066F PR DOCUMENTATION OF TREATMENT FOR NEPHROPATHY: ICD-10-PCS | Mod: CPTII,S$GLB,, | Performed by: NURSE PRACTITIONER

## 2022-05-23 PROCEDURE — 3061F NEG MICROALBUMINURIA REV: CPT | Mod: CPTII,S$GLB,, | Performed by: NURSE PRACTITIONER

## 2022-05-23 RX ORDER — ALENDRONATE SODIUM 35 MG/1
35 TABLET ORAL
Qty: 12 TABLET | Refills: 1 | Status: SHIPPED | OUTPATIENT
Start: 2022-05-23 | End: 2022-11-28 | Stop reason: SDUPTHER

## 2022-05-23 RX ORDER — LISINOPRIL 20 MG/1
20 TABLET ORAL DAILY
Qty: 90 TABLET | Refills: 1 | Status: SHIPPED | OUTPATIENT
Start: 2022-05-23 | End: 2022-11-23 | Stop reason: SDUPTHER

## 2022-05-23 RX ORDER — LOVASTATIN 20 MG/1
20 TABLET ORAL NIGHTLY
Qty: 90 TABLET | Refills: 1 | Status: SHIPPED | OUTPATIENT
Start: 2022-05-23 | End: 2022-11-23 | Stop reason: SDUPTHER

## 2022-05-23 RX ORDER — MIRTAZAPINE 15 MG/1
15 TABLET, FILM COATED ORAL NIGHTLY
Qty: 90 TABLET | Refills: 1 | Status: SHIPPED | OUTPATIENT
Start: 2022-05-23 | End: 2022-11-23 | Stop reason: SDUPTHER

## 2022-05-23 NOTE — PROGRESS NOTES
SUBJECTIVE:    Patient ID: Tanika Olivera is a 71 y.o. female.    Chief Complaint: Hypertension (Bottles brought//Pt is here for a 6 month check up and medication refills.//referral for colonoscopy and mammogram today.//ABDOUL)    Patient here for regular mcnvvo-jh-TRM/lipids/osteopenia  Patient reports overall doing well, no complaints today.  Working out at the gym 5 days a week  Reports monitors her blood pressure at home occasionally and it is always in the 120s-130 range though blood pressure always seems to be higher when she comes to a doctor's appointment      Lab Visit on 12/28/2021   Component Date Value Ref Range Status    SARS-CoV2 (COVID-19) Qualitative P* 12/28/2021 Not Detected  Not Detected Final    SARS-COV-2- Cycle Number 12/28/2021 N/A   Final       Past Medical History:   Diagnosis Date    History of hepatitis C, s/p successful Rx w/ SVR24 (cure) - 11/2018 2/23/2017    S/p epclusa w/ SVR    HTN (hypertension)     Hyperlipidemia     Osteoporosis      Past Surgical History:   Procedure Laterality Date    TOTAL ABDOMINAL HYSTERECTOMY       Family History   Problem Relation Age of Onset    Hypertension Mother     Hyperlipidemia Mother     Stroke Father        The 10-year CVD risk score (D'Agostino, et al., 2008) is: 9.7%    Values used to calculate the score:      Age: 71 years      Sex: Female      Diabetic: No      Tobacco smoker: No      Systolic Blood Pressure: 138 mmHg      Is BP treated: Yes      HDL Cholesterol: 95 mg/dL      Total Cholesterol: 190 mg/dL     Marital Status:   Alcohol History:  reports no history of alcohol use.  Tobacco History:  reports that she has never smoked. She has never used smokeless tobacco.  Drug History:  reports no history of drug use.    Health Maintenance Topics with due status: Not Due       Topic Last Completion Date    DEXA Scan 06/17/2021    Lipid Panel 01/10/2022     Immunization History   Administered Date(s) Administered    COVID-19, MRNA,  LN-S, PF (Pfizer) (Purple Cap) 03/05/2021, 03/26/2021, 10/06/2021    Hepatitis A, Adult 03/02/2017, 09/05/2017    Influenza 12/28/2006, 11/30/2007, 10/24/2008, 11/20/2009, 11/05/2010, 10/24/2011, 11/15/2016    Influenza - High Dose - PF (65 years and older) 10/31/2018, 10/07/2019    Influenza - Quadrivalent - PF *Preferred* (6 months and older) 10/23/2015, 11/15/2016, 10/26/2017    Influenza - Trivalent - PF (ADULT) 10/22/2012, 01/06/2014, 01/12/2015    Influenza A (H1N1) 2009 Monovalent - IM 11/20/2009    Pneumococcal Conjugate - 13 Valent 10/31/2018    Pneumococcal Polysaccharide - 23 Valent 01/06/2014, 11/07/2019    Zoster Recombinant 10/07/2019       Review of patient's allergies indicates:  No Known Allergies    Current Outpatient Medications:     calcium carbonate (OS-KYLAH) 500 mg calcium (1,250 mg) tablet, Take 1 tablet by mouth once daily., Disp: , Rfl:     cholecalciferol, vitamin D3, (VITAMIN D3) 2,000 unit Tab, 1 tablet, Disp: , Rfl:     estradioL (ESTRACE) 0.01 % (0.1 mg/gram) vaginal cream, Place 2 g vaginally every 7 days., Disp: 42.5 g, Rfl: 5    fish oil-omega-3 fatty acids 300-1,000 mg capsule, Take 2 g by mouth once daily., Disp: , Rfl:     MULTIVITAMIN (MULTIPLE VITAMIN ORAL), Take 1 tablet by mouth once daily., Disp: , Rfl:     alendronate (FOSAMAX) 35 MG tablet, Take 1 tablet (35 mg total) by mouth every 7 days. take on empty stomach with full glass of water/do not eat or lie down for 1 hour after. For osteopenia, Disp: 12 tablet, Rfl: 1    lisinopriL (PRINIVIL,ZESTRIL) 20 MG tablet, Take 1 tablet (20 mg total) by mouth once daily., Disp: 90 tablet, Rfl: 1    lovastatin (MEVACOR) 20 MG tablet, Take 1 tablet (20 mg total) by mouth every evening., Disp: 90 tablet, Rfl: 1    mirtazapine (REMERON) 15 MG tablet, Take 1 tablet (15 mg total) by mouth every evening., Disp: 90 tablet, Rfl: 1    Review of Systems   Constitutional: Negative for appetite change, chills, fatigue, fever and  "unexpected weight change.   HENT: Negative for sore throat and trouble swallowing.    Eyes: Negative for visual disturbance.   Respiratory: Negative for cough, shortness of breath and wheezing.    Cardiovascular: Negative for chest pain, palpitations and leg swelling.   Gastrointestinal: Negative for abdominal pain, blood in stool, constipation, diarrhea, nausea and vomiting.   Genitourinary: Negative for difficulty urinating, dysuria, frequency and hematuria.   Musculoskeletal: Negative for arthralgias, back pain and gait problem.   Neurological: Negative for dizziness, tremors, seizures, numbness and headaches.   Psychiatric/Behavioral: Negative for dysphoric mood, sleep disturbance (Stable on med) and suicidal ideas. The patient is not nervous/anxious.           Objective:      Vitals:    05/23/22 1350 05/23/22 1357 05/23/22 1415   BP: (!) 160/90 (!) 154/82 138/80   Pulse: 69     SpO2: 97%     Weight: 50.3 kg (110 lb 12.8 oz)     Height: 4' 11.25" (1.505 m)       Physical Exam  Vitals reviewed.   Constitutional:       General: She is not in acute distress.     Appearance: Normal appearance. She is well-developed and normal weight.      Comments: Healthy appearing white female, appears younger than stated age   HENT:      Head: Normocephalic and atraumatic.      Right Ear: Tympanic membrane and ear canal normal.      Left Ear: Tympanic membrane and ear canal normal.   Neck:      Vascular: No carotid bruit.   Cardiovascular:      Rate and Rhythm: Normal rate and regular rhythm.      Heart sounds: No murmur heard.    No friction rub. No gallop.   Pulmonary:      Effort: Pulmonary effort is normal. No respiratory distress.      Breath sounds: Normal breath sounds. No wheezing or rales.   Abdominal:      General: There is no distension.      Palpations: Abdomen is soft.      Tenderness: There is no abdominal tenderness.   Musculoskeletal:      Cervical back: Neck supple.      Right lower leg: No edema.      Left lower " leg: No edema.   Lymphadenopathy:      Cervical: No cervical adenopathy.   Skin:     General: Skin is warm and dry.      Findings: No rash.   Neurological:      General: No focal deficit present.      Mental Status: She is alert and oriented to person, place, and time.      Gait: Gait normal.   Psychiatric:         Mood and Affect: Mood normal.           Assessment:       1. Essential hypertension    2. Mixed hyperlipidemia    3. Primary insomnia    4. Osteopenia of lumbar spine    5. Colon cancer screening    6. Encounter for screening mammogram for malignant neoplasm of breast            Plan:       Essential hypertension  Comments:  BP improved on recheck, encouraged to continue to monitor at home with goal /80  Orders:  -     lisinopriL (PRINIVIL,ZESTRIL) 20 MG tablet; Take 1 tablet (20 mg total) by mouth once daily.  Dispense: 90 tablet; Refill: 1    Mixed hyperlipidemia  Comments:  Well controlled on last labs  Orders:  -     lovastatin (MEVACOR) 20 MG tablet; Take 1 tablet (20 mg total) by mouth every evening.  Dispense: 90 tablet; Refill: 1    Primary insomnia  Comments:  Stable on med  Orders:  -     mirtazapine (REMERON) 15 MG tablet; Take 1 tablet (15 mg total) by mouth every evening.  Dispense: 90 tablet; Refill: 1    Osteopenia of lumbar spine  Comments:  Tolerating alendronate, due for follow-up DEXA next year  Orders:  -     alendronate (FOSAMAX) 35 MG tablet; Take 1 tablet (35 mg total) by mouth every 7 days. take on empty stomach with full glass of water/do not eat or lie down for 1 hour after. For osteopenia  Dispense: 12 tablet; Refill: 1    Colon cancer screening  Comments:  Discussed colon cancer screening and recommend C scope  Orders:  -     Ambulatory referral/consult to Gastroenterology; Future; Expected date: 05/30/2022    Encounter for screening mammogram for malignant neoplasm of breast   -     Mammo Digital Screening Bilat; Future; Expected date: 05/23/2022      Follow up in about  6 months (around 11/23/2022) for HTN, lipids.          Counseled on age and gender appropriate medical preventative services, including cancer screenings, immunizations, overall nutritional health, need for a consistent exercise regimen and an overall push towards maintaining a vigorous and active lifestyle.      5/23/2022 Jennyfer Hernandez NP

## 2022-05-24 ENCOUNTER — TELEPHONE (OUTPATIENT)
Dept: FAMILY MEDICINE | Facility: CLINIC | Age: 72
End: 2022-05-24
Payer: MEDICARE

## 2022-05-26 ENCOUNTER — TELEPHONE (OUTPATIENT)
Dept: FAMILY MEDICINE | Facility: CLINIC | Age: 72
End: 2022-05-26
Payer: MEDICARE

## 2022-05-26 ENCOUNTER — TELEPHONE (OUTPATIENT)
Dept: GASTROENTEROLOGY | Facility: CLINIC | Age: 72
End: 2022-05-26
Payer: MEDICARE

## 2022-05-26 DIAGNOSIS — K63.5 POLYP OF COLON, UNSPECIFIED PART OF COLON, UNSPECIFIED TYPE: Primary | ICD-10-CM

## 2022-05-26 NOTE — TELEPHONE ENCOUNTER
Colonoscopy scheduled with patient: 7/5/2022 1100 present 1000 . Fully Covid vaccinated. Mag Citrate prep. Questions asked about: bowel changes, painful movements, diarrhea, constipation, abdominal pain, visible blood in stool, acid reflux. Patient denies any issues. Asked about kidney issues/concerns, patient denies. Reviewed instructions with patient with their voicing understanding. Instructions via portal.

## 2022-06-07 ENCOUNTER — TELEPHONE (OUTPATIENT)
Dept: FAMILY MEDICINE | Facility: CLINIC | Age: 72
End: 2022-06-07

## 2022-06-07 NOTE — TELEPHONE ENCOUNTER
----- Message from Anna Amaya MA sent at 6/7/2022  2:03 PM CDT -----    ----- Message -----  From: Kelly Wong MA  Sent: 6/7/2022   1:19 PM CDT  To: Esdras Bangura

## 2022-07-05 ENCOUNTER — ANESTHESIA (OUTPATIENT)
Dept: ENDOSCOPY | Facility: HOSPITAL | Age: 72
End: 2022-07-05
Payer: MEDICARE

## 2022-07-05 ENCOUNTER — ANESTHESIA EVENT (OUTPATIENT)
Dept: ENDOSCOPY | Facility: HOSPITAL | Age: 72
End: 2022-07-05
Payer: MEDICARE

## 2022-07-05 ENCOUNTER — HOSPITAL ENCOUNTER (OUTPATIENT)
Facility: HOSPITAL | Age: 72
Discharge: HOME | End: 2022-07-05
Attending: INTERNAL MEDICINE | Admitting: INTERNAL MEDICINE
Payer: MEDICARE

## 2022-07-05 VITALS
WEIGHT: 110 LBS | RESPIRATION RATE: 17 BRPM | SYSTOLIC BLOOD PRESSURE: 138 MMHG | TEMPERATURE: 98 F | HEART RATE: 56 BPM | BODY MASS INDEX: 22.18 KG/M2 | OXYGEN SATURATION: 100 % | DIASTOLIC BLOOD PRESSURE: 78 MMHG | HEIGHT: 59 IN

## 2022-07-05 DIAGNOSIS — K63.5 POLYP OF COLON, UNSPECIFIED PART OF COLON, UNSPECIFIED TYPE: Primary | ICD-10-CM

## 2022-07-05 DIAGNOSIS — Z86.010 HISTORY OF COLON POLYPS: ICD-10-CM

## 2022-07-05 PROCEDURE — 88305 TISSUE EXAM BY PATHOLOGIST: CPT | Performed by: PATHOLOGY

## 2022-07-05 PROCEDURE — D9220A PRA ANESTHESIA: ICD-10-PCS | Mod: PT,ANES,, | Performed by: ANESTHESIOLOGY

## 2022-07-05 PROCEDURE — 27201089 HC SNARE, DISP (ANY): Performed by: INTERNAL MEDICINE

## 2022-07-05 PROCEDURE — 45385 PR COLONOSCOPY,REMV LESN,SNARE: ICD-10-PCS | Mod: PT,,, | Performed by: INTERNAL MEDICINE

## 2022-07-05 PROCEDURE — 45380 COLONOSCOPY AND BIOPSY: CPT | Mod: PT,59,, | Performed by: INTERNAL MEDICINE

## 2022-07-05 PROCEDURE — D9220A PRA ANESTHESIA: ICD-10-PCS | Mod: PT,CRNA,, | Performed by: NURSE ANESTHETIST, CERTIFIED REGISTERED

## 2022-07-05 PROCEDURE — 25000003 PHARM REV CODE 250: Performed by: INTERNAL MEDICINE

## 2022-07-05 PROCEDURE — 37000009 HC ANESTHESIA EA ADD 15 MINS: Performed by: INTERNAL MEDICINE

## 2022-07-05 PROCEDURE — 88305 TISSUE EXAM BY PATHOLOGIST: ICD-10-PCS | Mod: 26,,, | Performed by: PATHOLOGY

## 2022-07-05 PROCEDURE — 45385 COLONOSCOPY W/LESION REMOVAL: CPT | Mod: PT,,, | Performed by: INTERNAL MEDICINE

## 2022-07-05 PROCEDURE — 45385 COLONOSCOPY W/LESION REMOVAL: CPT | Mod: PT,59 | Performed by: INTERNAL MEDICINE

## 2022-07-05 PROCEDURE — 45380 PR COLONOSCOPY,BIOPSY: ICD-10-PCS | Mod: PT,59,, | Performed by: INTERNAL MEDICINE

## 2022-07-05 PROCEDURE — D9220A PRA ANESTHESIA: Mod: PT,CRNA,, | Performed by: NURSE ANESTHETIST, CERTIFIED REGISTERED

## 2022-07-05 PROCEDURE — 88305 TISSUE EXAM BY PATHOLOGIST: CPT | Mod: 26,,, | Performed by: PATHOLOGY

## 2022-07-05 PROCEDURE — 25000003 PHARM REV CODE 250: Performed by: NURSE ANESTHETIST, CERTIFIED REGISTERED

## 2022-07-05 PROCEDURE — 37000008 HC ANESTHESIA 1ST 15 MINUTES: Performed by: INTERNAL MEDICINE

## 2022-07-05 PROCEDURE — 63600175 PHARM REV CODE 636 W HCPCS: Performed by: NURSE ANESTHETIST, CERTIFIED REGISTERED

## 2022-07-05 PROCEDURE — 27201012 HC FORCEPS, HOT/COLD, DISP: Performed by: INTERNAL MEDICINE

## 2022-07-05 PROCEDURE — D9220A PRA ANESTHESIA: Mod: PT,ANES,, | Performed by: ANESTHESIOLOGY

## 2022-07-05 PROCEDURE — 27200997: Performed by: INTERNAL MEDICINE

## 2022-07-05 PROCEDURE — 45380 COLONOSCOPY AND BIOPSY: CPT | Mod: PT,59 | Performed by: INTERNAL MEDICINE

## 2022-07-05 RX ORDER — LIDOCAINE HYDROCHLORIDE 20 MG/ML
INJECTION INTRAVENOUS
Status: DISCONTINUED | OUTPATIENT
Start: 2022-07-05 | End: 2022-07-05

## 2022-07-05 RX ORDER — PROPOFOL 10 MG/ML
VIAL (ML) INTRAVENOUS
Status: DISCONTINUED | OUTPATIENT
Start: 2022-07-05 | End: 2022-07-05

## 2022-07-05 RX ORDER — SODIUM CHLORIDE 9 MG/ML
INJECTION, SOLUTION INTRAVENOUS CONTINUOUS
Status: DISCONTINUED | OUTPATIENT
Start: 2022-07-05 | End: 2022-07-05 | Stop reason: HOSPADM

## 2022-07-05 RX ADMIN — PROPOFOL 50 MG: 10 INJECTION, EMULSION INTRAVENOUS at 12:07

## 2022-07-05 RX ADMIN — LIDOCAINE HYDROCHLORIDE 50 MG: 20 INJECTION, SOLUTION INTRAVENOUS at 11:07

## 2022-07-05 RX ADMIN — PROPOFOL 50 MG: 10 INJECTION, EMULSION INTRAVENOUS at 11:07

## 2022-07-05 RX ADMIN — PROPOFOL 100 MG: 10 INJECTION, EMULSION INTRAVENOUS at 11:07

## 2022-07-05 RX ADMIN — SODIUM CHLORIDE: 0.9 INJECTION, SOLUTION INTRAVENOUS at 10:07

## 2022-07-05 NOTE — PROVATION PATIENT INSTRUCTIONS
Discharge Summary/Instructions after an Endoscopic Procedure  Patient Name: Tanika Olivera  Patient MRN: [id number]  Patient YOB: 1950 Tuesday, July 5, 2022  Fran Crump MD  Dear patient,  As a result of recent federal legislation (The Federal Cures Act), you may   receive lab or pathology results from your procedure in your [facility]   account before your physician is able to contact you. Your physician or   their representative will relay the results to you with their   recommendations at their soonest availability.  Thank you,  RESTRICTIONS:  During your procedure today, you received medications for sedation.  These   medications may affect your judgment, balance and coordination.  Therefore,   for 24 hours, you have the following restrictions:   - DO NOT drive a car, operate machinery, make legal/financial decisions,   sign important papers or drink alcohol.    ACTIVITY:  Today: no heavy lifting, straining or running due to procedural   sedation/anesthesia.  The following day: return to full activity including work.  DIET:  Eat and drink normally unless instructed otherwise.     TREATMENT FOR COMMON SIDE EFFECTS:  - Mild abdominal pain, nausea, belching, bloating or excessive gas:  rest,   eat lightly and use a heating pad.  - Sore Throat: treat with throat lozenges and/or gargle with warm salt   water.  - Because air was used during the procedure, expelling large amounts of air   from your rectum or belching is normal.  - If a bowel prep was taken, you may not have a bowel movement for 1-3 days.    This is normal.  SYMPTOMS TO WATCH FOR AND REPORT TO YOUR PHYSICIAN:  1. Abdominal pain or bloating, other than gas cramps.  2. Chest pain.  3. Back pain.  4. Signs of infection such as: chills or fever occurring within 24 hours   after the procedure.  5. Rectal bleeding, which would show as bright red, maroon, or black stools.   (A tablespoon of blood from the rectum is not serious, especially if    hemorrhoids are present.)  6. Vomiting.  7. Weakness or dizziness.  GO DIRECTLY TO THE NEAREST EMERGENCY ROOM IF YOU HAVE ANY OF THE FOLLOWING:      Difficulty breathing              Chills and/or fever over 101 F   Persistent vomiting and/or vomiting blood   Severe abdominal pain   Severe chest pain   Black, tarry stools   Bleeding- more than one tablespoon   Any other symptom or condition that you feel may need urgent attention  Your doctor recommends these additional instructions:  If any biopsies were taken, your doctors clinic will contact you in 1 to 2   weeks with any results.  - Patient has a contact number available for emergencies.  The signs and   symptoms of potential delayed complications were discussed with the   patient.  Return to normal activities tomorrow.  Written discharge   instructions were provided to the patient.   - High fiber diet.   - Continue present medications.   - No aspirin, ibuprofen, naproxen, or other non-steroidal anti-inflammatory   drugs.   - Await pathology results.   - Repeat colonoscopy in 3 years for surveillance.   - Discharge patient to home (ambulatory).   - Return to my office after studies are complete.  For questions, problems or results please call your physician - Fran Crump MD at Work:  [phone].  [facility], EMERGENCY ROOM PHONE NUMBER: [phone]  IF A COMPLICATION OR EMERGENCY SITUATION ARISES AND YOU ARE UNABLE TO REACH   YOUR PHYSICIAN - GO DIRECTLY TO THE EMERGENCY ROOM.  Fran Crump MD  7/5/2022 12:16:26 PM  This report has been verified and signed electronically.  Dear patient,  As a result of recent federal legislation (The Federal Cures Act), you may   receive lab or pathology results from your procedure in your [facility]   account before your physician is able to contact you. Your physician or   their representative will relay the results to you with their   recommendations at their soonest availability.  Thank  you,  PROVATION

## 2022-07-05 NOTE — TRANSFER OF CARE
"Anesthesia Transfer of Care Note    Patient: Tanika Olivera    Procedure(s) Performed: Procedure(s) (LRB):  COLONOSCOPY (N/A)    Patient location: GI    Anesthesia Type: general    Transport from OR: Transported from OR on room air with adequate spontaneous ventilation    Post pain: adequate analgesia    Post assessment: no apparent anesthetic complications and tolerated procedure well    Post vital signs: stable    Level of consciousness: awake and alert    Nausea/Vomiting: no nausea/vomiting    Complications: none    Transfer of care protocol was followed      Last vitals:   Visit Vitals  BP (!) 161/79 (BP Location: Left arm, Patient Position: Lying)   Pulse 63   Temp 36.9 °C (98.4 °F) (Skin)   Resp 16   Ht 4' 11.25" (1.505 m)   Wt 49.9 kg (110 lb)   SpO2 97%   Breastfeeding No   BMI 22.03 kg/m²     "

## 2022-07-05 NOTE — H&P
CC: History of colon polyp    71 year old female with above. States that symptoms are absent, no alleviating/exacerbating factors. No family history of colorectal CA. Positive personal history of polyps. No bleeding or weight loss.     ROS:  No headache, no fever/chills, no chest pain/SOB, no nausea/vomiting/diarrhea/constipation/GI bleeding/abdominal pain, no dysuria/hematuria.    VSSAF   Exam:   Alert and oriented x 3; no apparent distress   PERRLA, sclera anicteric  CV: Regular rate/rhythm, normal PMI   Lungs: Clear bilaterally with no wheeze/rales   Abdomen: Soft, NT/ND, normal bowel sounds   Ext: No cyanosis, clubbing     Impression:   As above    Plan:   Proceed with endoscopy. Further recs to follow.

## 2022-07-05 NOTE — ANESTHESIA POSTPROCEDURE EVALUATION
Anesthesia Post Evaluation    Patient: Tanika Olivera    Procedure(s) Performed: Procedure(s) (LRB):  COLONOSCOPY (N/A)    Final Anesthesia Type: general      Patient location during evaluation: PACU  Patient participation: Yes- Able to Participate  Level of consciousness: awake and alert and oriented  Post-procedure vital signs: reviewed and stable  Pain management: adequate  Airway patency: patent    PONV status at discharge: No PONV  Anesthetic complications: no      Cardiovascular status: blood pressure returned to baseline  Respiratory status: unassisted, spontaneous ventilation and room air  Hydration status: euvolemic  Follow-up not needed.          Vitals Value Taken Time   /78 07/05/22 1230   Temp 36.5 °C (97.7 °F) 07/05/22 1220   Pulse 56 07/05/22 1230   Resp 17 07/05/22 1230   SpO2 100 % 07/05/22 1230         Event Time   Out of Recovery 12:53:46         Pain/Halina Score: Halina Score: 10 (7/5/2022 12:20 PM)

## 2022-07-05 NOTE — ANESTHESIA PREPROCEDURE EVALUATION
07/05/2022  Tanika Olivera is a 71 y.o., female.      Pre-op Assessment    I have reviewed the Patient Summary Reports.     I have reviewed the Nursing Notes. I have reviewed the NPO Status.   I have reviewed the Medications.     Review of Systems  Anesthesia Hx:  No problems with previous Anesthesia Denies Hx of Anesthetic complications    Social:  Non-Smoker    Cardiovascular:   Hypertension Denies MI.  Denies CAD.    Denies CABG/stent.   Denies Angina. hyperlipidemia    Pulmonary:   Denies COPD.  Denies Asthma.  Denies Recent URI.    Renal/:   Denies Chronic Renal Disease.     Hepatic/GI:   Denies GERD. Liver Disease, Hepatitis, C    Neurological:   Denies TIA. Denies CVA. Denies Seizures.    Endocrine:   Denies Diabetes. Denies Hypothyroidism.    Psych:   Psychiatric History          Physical Exam  General: Well nourished, Cooperative, Alert and Oriented    Airway:  Mallampati: II / II  Mouth Opening: Normal  TM Distance: 4 - 6 cm  Tongue: Normal    Dental:  Intact    Chest/Lungs:  Clear to auscultation, Normal Respiratory Rate    Heart:  Rate: Normal  Rhythm: Regular Rhythm  Sounds: Normal        Anesthesia Plan  Type of Anesthesia, risks & benefits discussed:    Anesthesia Type: Gen Natural Airway  Intra-op Monitoring Plan: Standard ASA Monitors  Induction:  IV  Informed Consent: Informed consent signed with the Patient and all parties understand the risks and agree with anesthesia plan.  All questions answered.   ASA Score: 2    Ready For Surgery From Anesthesia Perspective.     .

## 2022-07-12 LAB
FINAL PATHOLOGIC DIAGNOSIS: NORMAL
Lab: NORMAL

## 2022-11-23 ENCOUNTER — OFFICE VISIT (OUTPATIENT)
Dept: FAMILY MEDICINE | Facility: CLINIC | Age: 72
End: 2022-11-23
Payer: MEDICARE

## 2022-11-23 VITALS
HEART RATE: 72 BPM | HEIGHT: 59 IN | DIASTOLIC BLOOD PRESSURE: 82 MMHG | WEIGHT: 110 LBS | SYSTOLIC BLOOD PRESSURE: 136 MMHG | BODY MASS INDEX: 22.18 KG/M2

## 2022-11-23 DIAGNOSIS — M85.88 OSTEOPENIA OF LUMBAR SPINE: ICD-10-CM

## 2022-11-23 DIAGNOSIS — I10 ESSENTIAL HYPERTENSION: Primary | ICD-10-CM

## 2022-11-23 DIAGNOSIS — F51.01 PRIMARY INSOMNIA: ICD-10-CM

## 2022-11-23 DIAGNOSIS — E78.2 MIXED HYPERLIPIDEMIA: ICD-10-CM

## 2022-11-23 DIAGNOSIS — N95.2 ATROPHIC VAGINITIS: ICD-10-CM

## 2022-11-23 PROCEDURE — 3075F PR MOST RECENT SYSTOLIC BLOOD PRESS GE 130-139MM HG: ICD-10-PCS | Mod: CPTII,S$GLB,, | Performed by: NURSE PRACTITIONER

## 2022-11-23 PROCEDURE — 1160F PR REVIEW ALL MEDS BY PRESCRIBER/CLIN PHARMACIST DOCUMENTED: ICD-10-PCS | Mod: CPTII,S$GLB,, | Performed by: NURSE PRACTITIONER

## 2022-11-23 PROCEDURE — 3061F NEG MICROALBUMINURIA REV: CPT | Mod: CPTII,S$GLB,, | Performed by: NURSE PRACTITIONER

## 2022-11-23 PROCEDURE — 4010F PR ACE/ARB THEARPY RXD/TAKEN: ICD-10-PCS | Mod: CPTII,S$GLB,, | Performed by: NURSE PRACTITIONER

## 2022-11-23 PROCEDURE — 3008F PR BODY MASS INDEX (BMI) DOCUMENTED: ICD-10-PCS | Mod: CPTII,S$GLB,, | Performed by: NURSE PRACTITIONER

## 2022-11-23 PROCEDURE — 1160F RVW MEDS BY RX/DR IN RCRD: CPT | Mod: CPTII,S$GLB,, | Performed by: NURSE PRACTITIONER

## 2022-11-23 PROCEDURE — 3079F DIAST BP 80-89 MM HG: CPT | Mod: CPTII,S$GLB,, | Performed by: NURSE PRACTITIONER

## 2022-11-23 PROCEDURE — 3066F PR DOCUMENTATION OF TREATMENT FOR NEPHROPATHY: ICD-10-PCS | Mod: CPTII,S$GLB,, | Performed by: NURSE PRACTITIONER

## 2022-11-23 PROCEDURE — 1159F PR MEDICATION LIST DOCUMENTED IN MEDICAL RECORD: ICD-10-PCS | Mod: CPTII,S$GLB,, | Performed by: NURSE PRACTITIONER

## 2022-11-23 PROCEDURE — 4010F ACE/ARB THERAPY RXD/TAKEN: CPT | Mod: CPTII,S$GLB,, | Performed by: NURSE PRACTITIONER

## 2022-11-23 PROCEDURE — 3075F SYST BP GE 130 - 139MM HG: CPT | Mod: CPTII,S$GLB,, | Performed by: NURSE PRACTITIONER

## 2022-11-23 PROCEDURE — 3079F PR MOST RECENT DIASTOLIC BLOOD PRESSURE 80-89 MM HG: ICD-10-PCS | Mod: CPTII,S$GLB,, | Performed by: NURSE PRACTITIONER

## 2022-11-23 PROCEDURE — 1159F MED LIST DOCD IN RCRD: CPT | Mod: CPTII,S$GLB,, | Performed by: NURSE PRACTITIONER

## 2022-11-23 PROCEDURE — 3061F PR NEG MICROALBUMINURIA RESULT DOCUMENTED/REVIEW: ICD-10-PCS | Mod: CPTII,S$GLB,, | Performed by: NURSE PRACTITIONER

## 2022-11-23 PROCEDURE — 3066F NEPHROPATHY DOC TX: CPT | Mod: CPTII,S$GLB,, | Performed by: NURSE PRACTITIONER

## 2022-11-23 PROCEDURE — 99213 OFFICE O/P EST LOW 20 MIN: CPT | Mod: S$GLB,,, | Performed by: NURSE PRACTITIONER

## 2022-11-23 PROCEDURE — 99213 PR OFFICE/OUTPT VISIT, EST, LEVL III, 20-29 MIN: ICD-10-PCS | Mod: S$GLB,,, | Performed by: NURSE PRACTITIONER

## 2022-11-23 PROCEDURE — 3008F BODY MASS INDEX DOCD: CPT | Mod: CPTII,S$GLB,, | Performed by: NURSE PRACTITIONER

## 2022-11-23 RX ORDER — LOVASTATIN 20 MG/1
20 TABLET ORAL NIGHTLY
Qty: 90 TABLET | Refills: 2 | Status: SHIPPED | OUTPATIENT
Start: 2022-11-23 | End: 2023-05-23 | Stop reason: SDUPTHER

## 2022-11-23 RX ORDER — MIRTAZAPINE 15 MG/1
15 TABLET, FILM COATED ORAL NIGHTLY
Qty: 90 TABLET | Refills: 2 | Status: SHIPPED | OUTPATIENT
Start: 2022-11-23 | End: 2023-05-23 | Stop reason: SDUPTHER

## 2022-11-23 RX ORDER — LISINOPRIL 20 MG/1
20 TABLET ORAL DAILY
Qty: 90 TABLET | Refills: 2 | Status: SHIPPED | OUTPATIENT
Start: 2022-11-23 | End: 2023-05-23 | Stop reason: SDUPTHER

## 2022-11-23 RX ORDER — ESTRADIOL 0.1 MG/G
2 CREAM VAGINAL
Qty: 42.5 G | Refills: 5 | Status: SHIPPED | OUTPATIENT
Start: 2022-11-23 | End: 2024-02-26 | Stop reason: SDUPTHER

## 2022-11-23 NOTE — PROGRESS NOTES
"  SUBJECTIVE:    Patient ID: Tanika Olivera is a 71 y.o. female.    Chief Complaint: Hypertension (Brought bottles, Flu states had// SW)    Patient here for regular [id number]/lipids/osteopenia  Pt reports overall doing well. Works out at the gym daily for over 2 hours.  Denies c/o's today  Had C scope in July-repeat 3 years      Admission on 07/05/2022, Discharged on 07/05/2022   Component Date Value Ref Range Status    Final Pathologic Diagnosis 07/05/2022    Final                    Value:[facility] DIAGNOSIS:    DESCENDING COLON POLYPS, POLYPECTOMY:  -  Tubular adenoma involving all four(4) fragments of tissue.  -  No evidence of high grade dysplasia.    Evelina Byrne M.D.  Report attached.  Performing site:  [place] [address] [place] [address]  "Disclaimer:  This case diagnosis was rendered completely by the outside  consultation pathologist and the case is electronically signed by an [facility]  pathologist listed below solely to release the report into the medical  record."      Disclaimer 07/05/2022    Final                    Value:Unless the case is a 'gross only' or additional testing only, the final  diagnosis for each specimen is based on a microscopic examination of  appropriate tissue sections.         Past Medical History:   Diagnosis Date    History of hepatitis C, s/p successful Rx w/ SVR24 (cure) - 11/2018 2/23/2017    S/p epclusa w/ SVR    HTN (hypertension)     Hyperlipidemia     Osteoporosis      Past Surgical History:   Procedure Laterality Date    COLONOSCOPY N/A 7/5/2022    Procedure: COLONOSCOPY;  Surgeon: Fran Palomares MD;  Location: [facility];  Service: Endoscopy;  Laterality: N/A;    TOTAL ABDOMINAL HYSTERECTOMY       Family History   Problem Relation Age of Onset    Hypertension Mother     Hyperlipidemia Mother     Stroke Father        The 10-year CVD risk score (D'Agostino, et al., 2008) is: 9.4%    Values used to calculate the score:      Age: 71 years      Sex: " Female      Diabetic: No      Tobacco smoker: No      Systolic Blood Pressure: 136 mmHg      Is BP treated: Yes      HDL Cholesterol: 95 mg/dL      Total Cholesterol: 190 mg/dL     Marital Status:   Alcohol History:  reports no history of alcohol use.  Tobacco History:  reports that she has never smoked. She has never used smokeless tobacco.  Drug History:  reports no history of drug use.    Health Maintenance Topics with due status: Not Due       Topic Last Completion Date    DEXA Scan 06/17/2021    Lipid Panel 01/10/2022    Mammogram 06/06/2022    Colorectal Cancer Screening 07/05/2022     Immunization History   Administered Date(s) Administered    COVID-19, MRNA, LN-S, PF (Pfizer) (Gray Cap) 06/11/2022    COVID-19, MRNA, LN-S, PF (Pfizer) (Purple Cap) 03/05/2021, 03/26/2021, 10/06/2021    Hepatitis A, Adult 03/02/2017, 09/05/2017    Influenza 12/28/2006, 11/30/2007, 10/24/2008, 11/20/2009, 11/05/2010, 10/24/2011, 11/15/2016, 10/31/2020    Influenza (FLUAD) - Quadrivalent - Adjuvanted - PF *Preferred* (65+) 10/02/2021    Influenza - High Dose - PF (65 years and older) 10/31/2018, 10/07/2019    Influenza - Quadrivalent - High Dose - PF (65 years and older) 09/10/2020    Influenza - Quadrivalent - PF *Preferred* (6 months and older) 10/23/2015, 11/15/2016, 10/26/2017    Influenza - Trivalent - PF (ADULT) 10/22/2012, 01/06/2014, 01/12/2015    Influenza A (H1N1) 2009 Monovalent - IM 11/20/2009    Pneumococcal Conjugate - 13 Valent 10/31/2018    Pneumococcal Polysaccharide - 23 Valent 01/06/2014, 11/07/2019    Zoster Recombinant 10/07/2019, 12/17/2019       Review of patient's allergies indicates:  No Known Allergies    Current Outpatient Medications:     alendronate (FOSAMAX) 35 MG tablet, Take 1 tablet (35 mg total) by mouth every 7 days. take on empty stomach with full glass of water/do not eat or lie down for 1 hour after. For osteopenia, Disp: 12 tablet, Rfl: 1    calcium carbonate (OS-KYLAH) 500 mg calcium  "(1,250 mg) tablet, Take 1 tablet by mouth once daily., Disp: , Rfl:     cholecalciferol, vitamin D3, (VITAMIN D3) 2,000 unit Tab, 1 tablet, Disp: , Rfl:     fish oil-omega-3 fatty acids 300-1,000 mg capsule, Take 2 g by mouth once daily., Disp: , Rfl:     MULTIVITAMIN (MULTIPLE VITAMIN ORAL), Take 1 tablet by mouth once daily., Disp: , Rfl:     estradioL (ESTRACE) 0.01 % (0.1 mg/gram) vaginal cream, Place 2 g vaginally every 7 days., Disp: 42.5 g, Rfl: 5    lisinopriL (PRINIVIL,ZESTRIL) 20 MG tablet, Take 1 tablet (20 mg total) by mouth once daily., Disp: 90 tablet, Rfl: 2    lovastatin (MEVACOR) 20 MG tablet, Take 1 tablet (20 mg total) by mouth every evening., Disp: 90 tablet, Rfl: 2    mirtazapine (REMERON) 15 MG tablet, Take 1 tablet (15 mg total) by mouth every evening., Disp: 90 tablet, Rfl: 2    Review of Systems   Constitutional:  Negative for appetite change, chills, fatigue, fever and unexpected weight change.   HENT:  Negative for sore throat and trouble swallowing.    Eyes:  Negative for visual disturbance.   Respiratory:  Negative for cough, shortness of breath and wheezing.    Cardiovascular:  Negative for chest pain, palpitations and leg swelling.   Gastrointestinal:  Negative for abdominal pain, blood in stool, constipation, diarrhea, nausea and vomiting.   Genitourinary:  Negative for difficulty urinating, dysuria, frequency and hematuria.   Musculoskeletal:  Negative for arthralgias, back pain and gait problem.   Neurological:  Negative for dizziness, tremors, seizures, numbness and headaches.   Psychiatric/Behavioral:  Negative for dysphoric mood, sleep disturbance (Stable on med) and suicidal ideas. The patient is not nervous/anxious.         Objective:      Vitals:    11/23/22 1426   BP: 136/82   Pulse: 72   Weight: 49.9 kg (110 lb)   Height: 4' 11.25" (1.505 m)     Physical Exam  Vitals reviewed.   Constitutional:       General: She is not in acute distress.     Appearance: Normal appearance. " She is well-developed and normal weight.      Comments: Healthy appearing white female, appears younger than stated age   HENT:      Head: Normocephalic and atraumatic.   Neck:      Vascular: No carotid bruit.   Cardiovascular:      Rate and Rhythm: Normal rate and regular rhythm.      Heart sounds: No murmur heard.    No friction rub. No gallop.   Pulmonary:      Effort: Pulmonary effort is normal. No respiratory distress.      Breath sounds: Normal breath sounds. No wheezing or rales.   Abdominal:      General: There is no distension.      Palpations: Abdomen is soft.      Tenderness: There is no abdominal tenderness.   Musculoskeletal:      Cervical back: Neck supple.      Right lower leg: No edema.      Left lower leg: No edema.   Lymphadenopathy:      Cervical: No cervical adenopathy.   Skin:     General: Skin is warm and dry.      Findings: No rash.   Neurological:      General: No focal deficit present.      Mental Status: She is alert and oriented to person, place, and time.      Gait: Gait normal.   Psychiatric:         Mood and Affect: Mood normal.         Assessment:       1. Essential hypertension    2. Mixed hyperlipidemia    3. Primary insomnia    4. Atrophic vaginitis    5. Osteopenia of lumbar spine           Plan:       Essential hypertension  Comments:  BP well controlled  Orders:  -     lisinopriL (PRINIVIL,ZESTRIL) 20 MG tablet; Take 1 tablet (20 mg total) by mouth once daily.  Dispense: 90 tablet; Refill: 2  -     CBC Auto Differential; Future; Expected date: 11/23/2022  -     Comprehensive Metabolic Panel; Future; Expected date: 11/23/2022  -     TSH w/reflex to FT4; Future; Expected date: 11/23/2022  -     Urinalysis, Reflex to Urine Culture Urine, Clean Catch; Future; Expected date: 11/23/2022  -     Microalbumin/Creatinine Ratio, Urine; Future; Expected date: 11/23/2022  -     Lipid Panel; Future; Expected date: 11/23/2022    Mixed hyperlipidemia  Comments:  Well controlled on last labs,  repeat before next visit  Orders:  -     lovastatin (MEVACOR) 20 MG tablet; Take 1 tablet (20 mg total) by mouth every evening.  Dispense: 90 tablet; Refill: 2    Primary insomnia  Comments:  Stable on med  Orders:  -     mirtazapine (REMERON) 15 MG tablet; Take 1 tablet (15 mg total) by mouth every evening.  Dispense: 90 tablet; Refill: 2    Atrophic vaginitis  Comments:  stable using vaginal estrogen  Orders:  -     estradioL (ESTRACE) 0.01 % (0.1 mg/gram) vaginal cream; Place 2 g vaginally every 7 days.  Dispense: 42.5 g; Refill: 5    Osteopenia of lumbar spine  Comments:  Stable on last DEXA, continue with calcium, vitamin-D and weight-bearing exercise    Follow up in about 6 months (around 5/23/2023) for HTN, lipids.          Counseled on age and gender appropriate medical preventative services, including cancer screenings, immunizations, overall nutritional health, need for a consistent exercise regimen and an overall push towards maintaining a vigorous and active lifestyle.      11/23/2022 Jennyfer Hernandez NP

## 2022-11-28 DIAGNOSIS — M85.88 OSTEOPENIA OF LUMBAR SPINE: ICD-10-CM

## 2022-11-28 RX ORDER — ALENDRONATE SODIUM 35 MG/1
35 TABLET ORAL
Qty: 12 TABLET | Refills: 3 | Status: SHIPPED | OUTPATIENT
Start: 2022-11-28 | End: 2023-06-27 | Stop reason: SDUPTHER

## 2022-11-28 NOTE — TELEPHONE ENCOUNTER
----- Message from Renee Cowan sent at 11/28/2022  8:41 AM CST -----  Pt needs refill on foskenia tapia   [phone]

## 2023-01-31 LAB
ALBUMIN SERPL-MCNC: 4.4 G/DL (ref 3.6–5.1)
ALBUMIN/GLOB SERPL: 1.4 (CALC) (ref 1–2.5)
ALP SERPL-CCNC: 40 U/L (ref 37–153)
ALT SERPL-CCNC: 13 U/L (ref 6–29)
AST SERPL-CCNC: 22 U/L (ref 10–35)
BASOPHILS # BLD AUTO: 20 CELLS/UL (ref 0–200)
BASOPHILS NFR BLD AUTO: 0.5 %
BILIRUB SERPL-MCNC: 0.4 MG/DL (ref 0.2–1.2)
BUN SERPL-MCNC: 14 MG/DL (ref 7–25)
BUN/CREAT SERPL: NORMAL (CALC) (ref 6–22)
CALCIUM SERPL-MCNC: 10 MG/DL (ref 8.6–10.4)
CHLORIDE SERPL-SCNC: 102 MMOL/L (ref 98–110)
CHOLEST SERPL-MCNC: 191 MG/DL
CHOLEST/HDLC SERPL: 2.1 (CALC)
CO2 SERPL-SCNC: 31 MMOL/L (ref 20–32)
CREAT SERPL-MCNC: 0.77 MG/DL (ref 0.6–1)
EGFR: 83 ML/MIN/1.73M2
EOSINOPHIL # BLD AUTO: 90 CELLS/UL (ref 15–500)
EOSINOPHIL NFR BLD AUTO: 2.3 %
ERYTHROCYTE [DISTWIDTH] IN BLOOD BY AUTOMATED COUNT: 13.1 % (ref 11–15)
GLOBULIN SER CALC-MCNC: 3.1 G/DL (CALC) (ref 1.9–3.7)
GLUCOSE SERPL-MCNC: 95 MG/DL (ref 65–99)
HCT VFR BLD AUTO: 41.9 % (ref 35–45)
HDLC SERPL-MCNC: 93 MG/DL
HGB BLD-MCNC: 13.7 G/DL (ref 11.7–15.5)
LDLC SERPL CALC-MCNC: 80 MG/DL (CALC)
LYMPHOCYTES # BLD AUTO: 1630 CELLS/UL (ref 850–3900)
LYMPHOCYTES NFR BLD AUTO: 41.8 %
MCH RBC QN AUTO: 29.9 PG (ref 27–33)
MCHC RBC AUTO-ENTMCNC: 32.7 G/DL (ref 32–36)
MCV RBC AUTO: 91.5 FL (ref 80–100)
MONOCYTES # BLD AUTO: 406 CELLS/UL (ref 200–950)
MONOCYTES NFR BLD AUTO: 10.4 %
NEUTROPHILS # BLD AUTO: 1755 CELLS/UL (ref 1500–7800)
NEUTROPHILS NFR BLD AUTO: 45 %
NONHDLC SERPL-MCNC: 98 MG/DL (CALC)
PLATELET # BLD AUTO: 283 THOUSAND/UL (ref 140–400)
PMV BLD REES-ECKER: 9.6 FL (ref 7.5–12.5)
POTASSIUM SERPL-SCNC: 4.4 MMOL/L (ref 3.5–5.3)
PROT SERPL-MCNC: 7.5 G/DL (ref 6.1–8.1)
RBC # BLD AUTO: 4.58 MILLION/UL (ref 3.8–5.1)
SODIUM SERPL-SCNC: 139 MMOL/L (ref 135–146)
TRIGL SERPL-MCNC: 97 MG/DL
TSH SERPL-ACNC: 2.31 MIU/L (ref 0.4–4.5)
WBC # BLD AUTO: 3.9 THOUSAND/UL (ref 3.8–10.8)

## 2023-02-02 LAB
ALBUMIN/CREAT UR: 9 MCG/MG CREAT
APPEARANCE UR: ABNORMAL
BACTERIA #/AREA URNS HPF: ABNORMAL /HPF
BACTERIA UR CULT: ABNORMAL
BILIRUB UR QL STRIP: NEGATIVE
COLOR UR: YELLOW
CREAT UR-MCNC: 47 MG/DL (ref 20–275)
GLUCOSE UR QL STRIP: NEGATIVE
HGB UR QL STRIP: NEGATIVE
HYALINE CASTS #/AREA URNS LPF: ABNORMAL /LPF
KETONES UR QL STRIP: NEGATIVE
LEUKOCYTE ESTERASE UR QL STRIP: NEGATIVE
MICROALBUMIN UR-MCNC: 0.4 MG/DL
NITRITE UR QL STRIP: NEGATIVE
PH UR STRIP: 7.5 [PH] (ref 5–8)
PROT UR QL STRIP: NEGATIVE
RBC #/AREA URNS HPF: ABNORMAL /HPF
SERVICE CMNT-IMP: ABNORMAL
SP GR UR STRIP: 1.01 (ref 1–1.03)
SQUAMOUS #/AREA URNS HPF: ABNORMAL /HPF
WBC #/AREA URNS HPF: ABNORMAL /HPF

## 2023-05-10 ENCOUNTER — TELEPHONE (OUTPATIENT)
Dept: FAMILY MEDICINE | Facility: CLINIC | Age: 73
End: 2023-05-10

## 2023-05-10 DIAGNOSIS — Z79.899 ENCOUNTER FOR LONG-TERM (CURRENT) USE OF OTHER MEDICATIONS: ICD-10-CM

## 2023-05-10 DIAGNOSIS — E78.2 MIXED HYPERLIPIDEMIA: Primary | ICD-10-CM

## 2023-05-10 NOTE — TELEPHONE ENCOUNTER
Spoke with pt in regards to pre-visit labs. Verbalized that we have placed lab order for you to have done before your upcoming appointment on 05/23/2023. Verbalized that the lab order are placed through [facility], so they can come into the office anytime, no appointment necessary. Just make sure that you are fasting for you labs. Pt acknowledge understanding.

## 2023-05-13 LAB
ALBUMIN SERPL-MCNC: 4.2 G/DL (ref 3.6–5.1)
ALBUMIN/GLOB SERPL: 1.4 (CALC) (ref 1–2.5)
ALP SERPL-CCNC: 39 U/L (ref 37–153)
ALT SERPL-CCNC: 12 U/L (ref 6–29)
AST SERPL-CCNC: 23 U/L (ref 10–35)
BILIRUB SERPL-MCNC: 0.5 MG/DL (ref 0.2–1.2)
BUN SERPL-MCNC: 18 MG/DL (ref 7–25)
BUN/CREAT SERPL: NORMAL (CALC) (ref 6–22)
CALCIUM SERPL-MCNC: 9.4 MG/DL (ref 8.6–10.4)
CHLORIDE SERPL-SCNC: 103 MMOL/L (ref 98–110)
CHOLEST SERPL-MCNC: 167 MG/DL
CHOLEST/HDLC SERPL: 2 (CALC)
CO2 SERPL-SCNC: 31 MMOL/L (ref 20–32)
CREAT SERPL-MCNC: 0.77 MG/DL (ref 0.6–1)
EGFR: 83 ML/MIN/1.73M2
GLOBULIN SER CALC-MCNC: 3 G/DL (CALC) (ref 1.9–3.7)
GLUCOSE SERPL-MCNC: 90 MG/DL (ref 65–99)
HDLC SERPL-MCNC: 85 MG/DL
LDLC SERPL CALC-MCNC: 68 MG/DL (CALC)
NONHDLC SERPL-MCNC: 82 MG/DL (CALC)
POTASSIUM SERPL-SCNC: 4.4 MMOL/L (ref 3.5–5.3)
PROT SERPL-MCNC: 7.2 G/DL (ref 6.1–8.1)
SODIUM SERPL-SCNC: 140 MMOL/L (ref 135–146)
TRIGL SERPL-MCNC: 60 MG/DL

## 2023-05-23 ENCOUNTER — OFFICE VISIT (OUTPATIENT)
Dept: FAMILY MEDICINE | Facility: CLINIC | Age: 73
End: 2023-05-23
Payer: MEDICARE

## 2023-05-23 VITALS
HEART RATE: 70 BPM | BODY MASS INDEX: 21.73 KG/M2 | WEIGHT: 107.81 LBS | SYSTOLIC BLOOD PRESSURE: 130 MMHG | OXYGEN SATURATION: 96 % | DIASTOLIC BLOOD PRESSURE: 72 MMHG | HEIGHT: 59 IN

## 2023-05-23 DIAGNOSIS — Z12.31 ENCOUNTER FOR SCREENING MAMMOGRAM FOR BREAST CANCER: ICD-10-CM

## 2023-05-23 DIAGNOSIS — F51.01 PRIMARY INSOMNIA: ICD-10-CM

## 2023-05-23 DIAGNOSIS — Z13.820 SCREENING FOR OSTEOPOROSIS: ICD-10-CM

## 2023-05-23 DIAGNOSIS — M85.88 OSTEOPENIA OF LUMBAR SPINE: ICD-10-CM

## 2023-05-23 DIAGNOSIS — Z78.0 MENOPAUSE: ICD-10-CM

## 2023-05-23 DIAGNOSIS — I10 ESSENTIAL HYPERTENSION: Primary | ICD-10-CM

## 2023-05-23 DIAGNOSIS — E78.2 MIXED HYPERLIPIDEMIA: ICD-10-CM

## 2023-05-23 PROCEDURE — 3075F PR MOST RECENT SYSTOLIC BLOOD PRESS GE 130-139MM HG: ICD-10-PCS | Mod: CPTII,S$GLB,, | Performed by: NURSE PRACTITIONER

## 2023-05-23 PROCEDURE — 3078F PR MOST RECENT DIASTOLIC BLOOD PRESSURE < 80 MM HG: ICD-10-PCS | Mod: CPTII,S$GLB,, | Performed by: NURSE PRACTITIONER

## 2023-05-23 PROCEDURE — 1160F PR REVIEW ALL MEDS BY PRESCRIBER/CLIN PHARMACIST DOCUMENTED: ICD-10-PCS | Mod: CPTII,S$GLB,, | Performed by: NURSE PRACTITIONER

## 2023-05-23 PROCEDURE — 3061F PR NEG MICROALBUMINURIA RESULT DOCUMENTED/REVIEW: ICD-10-PCS | Mod: CPTII,S$GLB,, | Performed by: NURSE PRACTITIONER

## 2023-05-23 PROCEDURE — 99213 PR OFFICE/OUTPT VISIT, EST, LEVL III, 20-29 MIN: ICD-10-PCS | Mod: S$GLB,,, | Performed by: NURSE PRACTITIONER

## 2023-05-23 PROCEDURE — 3008F BODY MASS INDEX DOCD: CPT | Mod: CPTII,S$GLB,, | Performed by: NURSE PRACTITIONER

## 2023-05-23 PROCEDURE — 4010F ACE/ARB THERAPY RXD/TAKEN: CPT | Mod: CPTII,S$GLB,, | Performed by: NURSE PRACTITIONER

## 2023-05-23 PROCEDURE — 3288F PR FALLS RISK ASSESSMENT DOCUMENTED: ICD-10-PCS | Mod: CPTII,S$GLB,, | Performed by: NURSE PRACTITIONER

## 2023-05-23 PROCEDURE — 3061F NEG MICROALBUMINURIA REV: CPT | Mod: CPTII,S$GLB,, | Performed by: NURSE PRACTITIONER

## 2023-05-23 PROCEDURE — 1160F RVW MEDS BY RX/DR IN RCRD: CPT | Mod: CPTII,S$GLB,, | Performed by: NURSE PRACTITIONER

## 2023-05-23 PROCEDURE — 1101F PR PT FALLS ASSESS DOC 0-1 FALLS W/OUT INJ PAST YR: ICD-10-PCS | Mod: CPTII,S$GLB,, | Performed by: NURSE PRACTITIONER

## 2023-05-23 PROCEDURE — 4010F PR ACE/ARB THEARPY RXD/TAKEN: ICD-10-PCS | Mod: CPTII,S$GLB,, | Performed by: NURSE PRACTITIONER

## 2023-05-23 PROCEDURE — 3008F PR BODY MASS INDEX (BMI) DOCUMENTED: ICD-10-PCS | Mod: CPTII,S$GLB,, | Performed by: NURSE PRACTITIONER

## 2023-05-23 PROCEDURE — 3066F NEPHROPATHY DOC TX: CPT | Mod: CPTII,S$GLB,, | Performed by: NURSE PRACTITIONER

## 2023-05-23 PROCEDURE — 3066F PR DOCUMENTATION OF TREATMENT FOR NEPHROPATHY: ICD-10-PCS | Mod: CPTII,S$GLB,, | Performed by: NURSE PRACTITIONER

## 2023-05-23 PROCEDURE — 1101F PT FALLS ASSESS-DOCD LE1/YR: CPT | Mod: CPTII,S$GLB,, | Performed by: NURSE PRACTITIONER

## 2023-05-23 PROCEDURE — 3075F SYST BP GE 130 - 139MM HG: CPT | Mod: CPTII,S$GLB,, | Performed by: NURSE PRACTITIONER

## 2023-05-23 PROCEDURE — 3078F DIAST BP <80 MM HG: CPT | Mod: CPTII,S$GLB,, | Performed by: NURSE PRACTITIONER

## 2023-05-23 PROCEDURE — 1159F MED LIST DOCD IN RCRD: CPT | Mod: CPTII,S$GLB,, | Performed by: NURSE PRACTITIONER

## 2023-05-23 PROCEDURE — 1159F PR MEDICATION LIST DOCUMENTED IN MEDICAL RECORD: ICD-10-PCS | Mod: CPTII,S$GLB,, | Performed by: NURSE PRACTITIONER

## 2023-05-23 PROCEDURE — 3288F FALL RISK ASSESSMENT DOCD: CPT | Mod: CPTII,S$GLB,, | Performed by: NURSE PRACTITIONER

## 2023-05-23 PROCEDURE — 99213 OFFICE O/P EST LOW 20 MIN: CPT | Mod: S$GLB,,, | Performed by: NURSE PRACTITIONER

## 2023-05-23 RX ORDER — LISINOPRIL 20 MG/1
20 TABLET ORAL DAILY
Qty: 90 TABLET | Refills: 3 | Status: SHIPPED | OUTPATIENT
Start: 2023-05-23 | End: 2024-02-26 | Stop reason: SDUPTHER

## 2023-05-23 RX ORDER — LOVASTATIN 20 MG/1
20 TABLET ORAL NIGHTLY
Qty: 90 TABLET | Refills: 3 | Status: SHIPPED | OUTPATIENT
Start: 2023-05-23 | End: 2024-02-26 | Stop reason: SDUPTHER

## 2023-05-23 RX ORDER — MIRTAZAPINE 15 MG/1
15 TABLET, FILM COATED ORAL NIGHTLY
Qty: 90 TABLET | Refills: 3 | Status: SHIPPED | OUTPATIENT
Start: 2023-05-23 | End: 2024-02-26 | Stop reason: SDUPTHER

## 2023-05-23 NOTE — PROGRESS NOTES
SUBJECTIVE:    Patient ID: Tanika Olivera is a 72 y.o. female.    Chief Complaint: Hypertension (Bottles brought//Pt is here for a 6 month check up and medication refills//Mammo and dexa scan ordered today. Pt would like the orders to go to [facility]//ABDOUL )    Patient here for regular vskhux-fk-DYP/lipids/osteopenia    Pt reports overall she's been good, no c/o's- continues to work out daily at the gym for 2 hours    Reports monitors blood pressure at home occasionally that is well controlled in the 110-120 range      Telephone on 05/10/2023   Component Date Value Ref Range Status    Glucose 05/12/2023 90  65 - 99 mg/dL Final    BUN 05/12/2023 18  7 - 25 mg/dL Final    Creatinine 05/12/2023 0.77  0.60 - 1.00 mg/dL Final    eGFR 05/12/2023 83  > OR = 60 mL/min/1.73m2 Final    BUN/Creatinine Ratio 05/12/2023 NOT APPLICABLE  6 - 22 (calc) Final    Sodium 05/12/2023 140  135 - 146 mmol/L Final    Potassium 05/12/2023 4.4  3.5 - 5.3 mmol/L Final    Chloride 05/12/2023 103  98 - 110 mmol/L Final    CO2 05/12/2023 31  20 - 32 mmol/L Final    Calcium 05/12/2023 9.4  8.6 - 10.4 mg/dL Final    Total Protein 05/12/2023 7.2  6.1 - 8.1 g/dL Final    Albumin 05/12/2023 4.2  3.6 - 5.1 g/dL Final    Globulin, Total 05/12/2023 3.0  1.9 - 3.7 g/dL (calc) Final    Albumin/Globulin Ratio 05/12/2023 1.4  1.0 - 2.5 (calc) Final    Total Bilirubin 05/12/2023 0.5  0.2 - 1.2 mg/dL Final    Alkaline Phosphatase 05/12/2023 39  37 - 153 U/L Final    AST 05/12/2023 23  10 - 35 U/L Final    ALT 05/12/2023 12  6 - 29 U/L Final    Cholesterol 05/12/2023 167  <200 mg/dL Final    HDL 05/12/2023 85  > OR = 50 mg/dL Final    Triglycerides 05/12/2023 60  <150 mg/dL Final    LDL Cholesterol 05/12/2023 68  mg/dL (calc) Final    HDL/Cholesterol Ratio 05/12/2023 2.0  <5.0 (calc) Final    Non HDL Chol. (LDL+VLDL) 05/12/2023 82  <130 mg/dL (calc) Final       Past Medical History:   Diagnosis Date    History of hepatitis C, s/p successful Rx w/ SVR24 (cure) -  11/2018 2/23/2017    S/p epclusa w/ SVR    HTN (hypertension)     Hyperlipidemia     Osteoporosis      Past Surgical History:   Procedure Laterality Date    COLONOSCOPY N/A 7/5/2022    Procedure: COLONOSCOPY;  Surgeon: Fran Palomares MD;  Location: [facility];  Service: Endoscopy;  Laterality: N/A;    TOTAL ABDOMINAL HYSTERECTOMY       Family History   Problem Relation Age of Onset    Hypertension Mother     Hyperlipidemia Mother     Stroke Father        The 10-year CVD risk score (JONH'Agoino, et al., 2008) is: 7.9%    Values used to calculate the score:      Age: 72 years      Sex: Female      Diabetic: No      Tobacco smoker: No      Systolic Blood Pressure: 130 mmHg      Is BP treated: Yes      HDL Cholesterol: 85 mg/dL      Total Cholesterol: 167 mg/dL     Marital Status:   Alcohol History:  reports no history of alcohol use.  Tobacco History:  reports that she has never smoked. She has never been exposed to tobacco smoke. She has never used smokeless tobacco.  Drug History:  reports no history of drug use.    Health Maintenance Topics with due status: Not Due       Topic Last Completion Date    Influenza Vaccine 10/02/2021    Colorectal Cancer Screening 07/05/2022    Lipid Panel 05/12/2023     Immunization History   Administered Date(s) Administered    COVID-19, MRNA, LN-S, PF (Pfizer) (Gray Cap) 06/11/2022    COVID-19, MRNA, LN-S, PF (Pfizer) (Purple Cap) 03/05/2021, 03/26/2021, 10/06/2021    Hepatitis A, Adult 03/02/2017, 09/05/2017    Influenza 12/28/2006, 11/30/2007, 10/24/2008, 11/20/2009, 11/05/2010, 10/24/2011, 11/15/2016, 10/31/2020    Influenza (FLUAD) - Quadrivalent - Adjuvanted - PF *Preferred* (65+) 10/02/2021    Influenza - High Dose - PF (65 years and older) 10/31/2018, 10/07/2019    Influenza - Quadrivalent - High Dose - PF (65 years and older) 09/10/2020    Influenza - Quadrivalent - PF *Preferred* (6 months and older) 10/23/2015, 11/15/2016, 10/26/2017    Influenza - Trivalent - PF  (ADULT) 10/22/2012, 01/06/2014, 01/12/2015    Influenza A (H1N1) 2009 Monovalent - IM 11/20/2009    Pneumococcal Conjugate - 13 Valent 10/31/2018    Pneumococcal Polysaccharide - 23 Valent 01/06/2014, 11/07/2019    Zoster Recombinant 10/07/2019, 12/17/2019       Review of patient's allergies indicates:  No Known Allergies    Current Outpatient Medications:     alendronate (FOSAMAX) 35 MG tablet, Take 1 tablet (35 mg total) by mouth every 7 days. take on empty stomach with full glass of water/do not eat or lie down for 1 hour after. For osteopenia, Disp: 12 tablet, Rfl: 3    calcium carbonate (OS-KYLAH) 500 mg calcium (1,250 mg) tablet, Take 1 tablet by mouth once daily., Disp: , Rfl:     cholecalciferol, vitamin D3, (VITAMIN D3) 2,000 unit Tab, 1 tablet, Disp: , Rfl:     estradioL (ESTRACE) 0.01 % (0.1 mg/gram) vaginal cream, Place 2 g vaginally every 7 days., Disp: 42.5 g, Rfl: 5    fish oil-omega-3 fatty acids 300-1,000 mg capsule, Take 2 g by mouth once daily., Disp: , Rfl:     MULTIVITAMIN (MULTIPLE VITAMIN ORAL), Take 1 tablet by mouth once daily., Disp: , Rfl:     lisinopriL (PRINIVIL,ZESTRIL) 20 MG tablet, Take 1 tablet (20 mg total) by mouth once daily., Disp: 90 tablet, Rfl: 3    lovastatin (MEVACOR) 20 MG tablet, Take 1 tablet (20 mg total) by mouth every evening., Disp: 90 tablet, Rfl: 3    mirtazapine (REMERON) 15 MG tablet, Take 1 tablet (15 mg total) by mouth every evening., Disp: 90 tablet, Rfl: 3    Review of Systems   Constitutional:  Negative for appetite change, chills, fatigue, fever and unexpected weight change.   HENT:  Negative for sore throat and trouble swallowing.    Eyes:  Negative for visual disturbance.   Respiratory:  Negative for cough, shortness of breath and wheezing.    Cardiovascular:  Negative for chest pain, palpitations and leg swelling.   Gastrointestinal:  Negative for abdominal pain, blood in stool, constipation, diarrhea, nausea and vomiting.   Genitourinary:  Negative for  "difficulty urinating, dysuria, frequency and hematuria.   Musculoskeletal:  Negative for arthralgias, back pain and gait problem.   Neurological:  Negative for dizziness, tremors, seizures, numbness and headaches.   Psychiatric/Behavioral:  Negative for dysphoric mood, sleep disturbance (Stable on med) and suicidal ideas. The patient is not nervous/anxious.         Objective:      Vitals:    05/23/23 1428 05/23/23 1436 05/23/23 1458   BP: (!) 144/72 (!) 142/72 130/72   Pulse: 70     SpO2: 96%     Weight: 48.9 kg (107 lb 12.8 oz)     Height: 4' 11.25" (1.505 m)       Physical Exam  Vitals reviewed.   Constitutional:       General: She is not in acute distress.     Appearance: Normal appearance. She is well-developed and normal weight.      Comments: Healthy appearing white female, appears younger than stated age   HENT:      Head: Normocephalic and atraumatic.      Right Ear: Tympanic membrane and ear canal normal.      Left Ear: Tympanic membrane and ear canal normal.   Neck:      Vascular: No carotid bruit.   Cardiovascular:      Rate and Rhythm: Normal rate and regular rhythm.      Heart sounds: No murmur heard.    No friction rub. No gallop.   Pulmonary:      Effort: Pulmonary effort is normal. No respiratory distress.      Breath sounds: Normal breath sounds. No wheezing or rales.   Abdominal:      General: There is no distension.      Palpations: Abdomen is soft.      Tenderness: There is no abdominal tenderness.   Musculoskeletal:      Cervical back: Neck supple.      Right lower leg: No edema.      Left lower leg: No edema.   Lymphadenopathy:      Cervical: No cervical adenopathy.   Skin:     General: Skin is warm and dry.      Findings: No rash.   Neurological:      General: No focal deficit present.      Mental Status: She is alert and oriented to person, place, and time.      Gait: Gait normal.   Psychiatric:         Mood and Affect: Mood normal.         Assessment:       1. Essential hypertension    2. " Mixed hyperlipidemia    3. Primary insomnia    4. Osteopenia of lumbar spine    5. Encounter for screening mammogram for breast cancer    6. Menopause    7. Screening for osteoporosis           Plan:       1. Essential hypertension  -BP improved on recheck and well controlled on home readings  -     lisinopriL (PRINIVIL,ZESTRIL) 20 MG tablet; Take 1 tablet (20 mg total) by mouth once daily.  Dispense: 90 tablet; Refill: 3    2. Mixed hyperlipidemia  -reviewed recent labs with patient, well controlled  -     lovastatin (MEVACOR) 20 MG tablet; Take 1 tablet (20 mg total) by mouth every evening.  Dispense: 90 tablet; Refill: 3    3. Primary insomnia  -stable on med  -     mirtazapine (REMERON) 15 MG tablet; Take 1 tablet (15 mg total) by mouth every evening.  Dispense: 90 tablet; Refill: 3    4. Osteopenia of lumbar spine   -due for repeat DEXA next month on alendronate 35 mg weekly    5. Encounter for screening mammogram for breast cancer  -     Mammo Digital Screening Bilat; Future; Expected date: 06/19/2023    6. Menopause  -     DXA Bone Density Appendicular Skeleton; Future; Expected date: 06/19/2023    7. Screening for osteoporosis  -     DXA Bone Density Appendicular Skeleton; Future; Expected date: 06/19/2023      Follow up in about 9 months (around 2/23/2024) for HTN, lipids.          Counseled on age and gender appropriate medical preventative services, including cancer screenings, immunizations, overall nutritional health, need for a consistent exercise regimen and an overall push towards maintaining a vigorous and active lifestyle.      5/23/2023 Jennyfer Hernandez NP

## 2023-06-20 ENCOUNTER — TELEPHONE (OUTPATIENT)
Dept: FAMILY MEDICINE | Facility: CLINIC | Age: 73
End: 2023-06-20

## 2023-06-20 DIAGNOSIS — Z13.820 SPECIAL SCREENING FOR OSTEOPOROSIS: ICD-10-CM

## 2023-06-20 DIAGNOSIS — Z78.0 POSTMENOPAUSAL STATUS (AGE-RELATED) (NATURAL): Primary | ICD-10-CM

## 2023-06-20 NOTE — TELEPHONE ENCOUNTER
----- Message from Ivon Betancur sent at 6/20/2023  3:40 PM CDT -----  Pat with [facility] called and stated that she need a new order that says bone density  axil because the other one the insurance is kicking it back. If any questions [phone] ext [phone] and her fax # [phone]

## 2023-06-26 DIAGNOSIS — M85.88 OSTEOPENIA OF LUMBAR SPINE: ICD-10-CM

## 2023-06-27 RX ORDER — ALENDRONATE SODIUM 70 MG/1
70 TABLET ORAL
Qty: 12 TABLET | Refills: 3 | Status: SHIPPED | OUTPATIENT
Start: 2023-06-27 | End: 2024-02-26 | Stop reason: SDUPTHER

## 2023-06-27 NOTE — TELEPHONE ENCOUNTER
Please call patient and let her know recent bone density test continues to show osteopenia though she has inched closer to osteoporosis and has had a 5.2% decrease in bone strength since 2021 in the lumbar spine.  Given this I would recommend increasing the alendronate 35 mg weekly to 70 mg weekly.  Continue calcium and vitamin-D supplement daily as well.  Repeat DEXA 2 years  Also let her know mammogram is negative, repeat 1 year

## 2023-06-27 NOTE — TELEPHONE ENCOUNTER
----- Message from Maria Antonia Ricks LPN sent at 6/20/2023 10:09 AM CDT -----  Linked to   ----- Message -----  From: Anna Amaya MA  Sent: 6/20/2023  10:01 AM CDT  To: Jnenyfer Hernandez Staff      ----- Message -----  From: Kelly Wong MA  Sent: 6/20/2023   9:58 AM CDT  To: Esdras Bangura

## 2023-06-27 NOTE — TELEPHONE ENCOUNTER
Spoke with pt in regards to recent dexa scan results. Verbalized per Jennyfer that pt's bone density test continues to show osteopenia though she has inched closer to osteoporosis and has had a 5.2% decrease in bone strength since 2021 in the lumbar spine.  Given this Jennyfer would recommend increasing the alendronate 35 mg weekly to 70 mg weekly.  Continue calcium and vitamin-D supplement daily as well.  Repeat DEXA 2 years  Also let her know mammogram is negative, repeat 1 year. Pt acknowledged understanding.

## 2024-02-07 ENCOUNTER — TELEPHONE (OUTPATIENT)
Dept: FAMILY MEDICINE | Facility: CLINIC | Age: 74
End: 2024-02-07
Payer: MEDICARE

## 2024-02-07 DIAGNOSIS — Z00.00 ANNUAL PHYSICAL EXAM: ICD-10-CM

## 2024-02-07 DIAGNOSIS — I10 ESSENTIAL HYPERTENSION: Primary | ICD-10-CM

## 2024-02-07 DIAGNOSIS — E78.2 MIXED HYPERLIPIDEMIA: ICD-10-CM

## 2024-02-15 LAB
ALBUMIN SERPL-MCNC: 4.8 G/DL (ref 3.6–5.1)
ALBUMIN/CREAT UR: 20 MCG/MG CREAT
ALBUMIN/GLOB SERPL: 1.5 (CALC) (ref 1–2.5)
ALP SERPL-CCNC: 37 U/L (ref 37–153)
ALT SERPL-CCNC: 15 U/L (ref 6–29)
APPEARANCE UR: CLEAR
AST SERPL-CCNC: 26 U/L (ref 10–35)
BACTERIA #/AREA URNS HPF: NORMAL /HPF
BACTERIA UR CULT: NORMAL
BASOPHILS # BLD AUTO: 9 CELLS/UL (ref 0–200)
BASOPHILS NFR BLD AUTO: 0.2 %
BILIRUB SERPL-MCNC: 0.4 MG/DL (ref 0.2–1.2)
BILIRUB UR QL STRIP: NEGATIVE
BUN SERPL-MCNC: 15 MG/DL (ref 7–25)
BUN/CREAT SERPL: NORMAL (CALC) (ref 6–22)
CALCIUM SERPL-MCNC: 10.1 MG/DL (ref 8.6–10.4)
CHLORIDE SERPL-SCNC: 99 MMOL/L (ref 98–110)
CHOLEST SERPL-MCNC: 208 MG/DL
CHOLEST/HDLC SERPL: 2.1 (CALC)
CO2 SERPL-SCNC: 31 MMOL/L (ref 20–32)
COLOR UR: YELLOW
CREAT SERPL-MCNC: 0.73 MG/DL (ref 0.6–1)
CREAT UR-MCNC: 10 MG/DL (ref 20–275)
EGFR: 88 ML/MIN/1.73M2
EOSINOPHIL # BLD AUTO: 99 CELLS/UL (ref 15–500)
EOSINOPHIL NFR BLD AUTO: 2.1 %
ERYTHROCYTE [DISTWIDTH] IN BLOOD BY AUTOMATED COUNT: 13 % (ref 11–15)
GLOBULIN SER CALC-MCNC: 3.2 G/DL (CALC) (ref 1.9–3.7)
GLUCOSE SERPL-MCNC: 99 MG/DL (ref 65–99)
GLUCOSE UR QL STRIP: NEGATIVE
HCT VFR BLD AUTO: 42.8 % (ref 35–45)
HDLC SERPL-MCNC: 97 MG/DL
HGB BLD-MCNC: 14.1 G/DL (ref 11.7–15.5)
HGB UR QL STRIP: NEGATIVE
HYALINE CASTS #/AREA URNS LPF: NORMAL /LPF
KETONES UR QL STRIP: NEGATIVE
LDLC SERPL CALC-MCNC: 94 MG/DL (CALC)
LEUKOCYTE ESTERASE UR QL STRIP: NEGATIVE
LYMPHOCYTES # BLD AUTO: 1753 CELLS/UL (ref 850–3900)
LYMPHOCYTES NFR BLD AUTO: 37.3 %
MCH RBC QN AUTO: 30.5 PG (ref 27–33)
MCHC RBC AUTO-ENTMCNC: 32.9 G/DL (ref 32–36)
MCV RBC AUTO: 92.4 FL (ref 80–100)
MICROALBUMIN UR-MCNC: 0.2 MG/DL
MONOCYTES # BLD AUTO: 522 CELLS/UL (ref 200–950)
MONOCYTES NFR BLD AUTO: 11.1 %
NEUTROPHILS # BLD AUTO: 2317 CELLS/UL (ref 1500–7800)
NEUTROPHILS NFR BLD AUTO: 49.3 %
NITRITE UR QL STRIP: NEGATIVE
NONHDLC SERPL-MCNC: 111 MG/DL (CALC)
PH UR STRIP: 7.5 [PH] (ref 5–8)
PLATELET # BLD AUTO: 306 THOUSAND/UL (ref 140–400)
PMV BLD REES-ECKER: 9.6 FL (ref 7.5–12.5)
POTASSIUM SERPL-SCNC: 4.2 MMOL/L (ref 3.5–5.3)
PROT SERPL-MCNC: 8 G/DL (ref 6.1–8.1)
PROT UR QL STRIP: NEGATIVE
RBC # BLD AUTO: 4.63 MILLION/UL (ref 3.8–5.1)
RBC #/AREA URNS HPF: NORMAL /HPF
SERVICE CMNT-IMP: NORMAL
SODIUM SERPL-SCNC: 138 MMOL/L (ref 135–146)
SP GR UR STRIP: 1 (ref 1–1.03)
SQUAMOUS #/AREA URNS HPF: NORMAL /HPF
TRIGL SERPL-MCNC: 84 MG/DL
TSH SERPL-ACNC: 2.35 MIU/L (ref 0.4–4.5)
WBC # BLD AUTO: 4.7 THOUSAND/UL (ref 3.8–10.8)
WBC #/AREA URNS HPF: NORMAL /HPF

## 2024-02-26 ENCOUNTER — OFFICE VISIT (OUTPATIENT)
Dept: FAMILY MEDICINE | Facility: CLINIC | Age: 74
End: 2024-02-26
Payer: MEDICARE

## 2024-02-26 VITALS
BODY MASS INDEX: 22.91 KG/M2 | HEART RATE: 72 BPM | WEIGHT: 113.63 LBS | DIASTOLIC BLOOD PRESSURE: 72 MMHG | HEIGHT: 59 IN | SYSTOLIC BLOOD PRESSURE: 144 MMHG | OXYGEN SATURATION: 97 %

## 2024-02-26 DIAGNOSIS — N95.2 ATROPHIC VAGINITIS: ICD-10-CM

## 2024-02-26 DIAGNOSIS — E78.2 MIXED HYPERLIPIDEMIA: ICD-10-CM

## 2024-02-26 DIAGNOSIS — F51.01 PRIMARY INSOMNIA: ICD-10-CM

## 2024-02-26 DIAGNOSIS — Z12.31 SCREENING MAMMOGRAM, ENCOUNTER FOR: ICD-10-CM

## 2024-02-26 DIAGNOSIS — I10 ESSENTIAL HYPERTENSION: Primary | ICD-10-CM

## 2024-02-26 DIAGNOSIS — M85.88 OSTEOPENIA OF LUMBAR SPINE: ICD-10-CM

## 2024-02-26 PROCEDURE — 99214 OFFICE O/P EST MOD 30 MIN: CPT | Mod: S$GLB,,, | Performed by: NURSE PRACTITIONER

## 2024-02-26 RX ORDER — ALENDRONATE SODIUM 70 MG/1
70 TABLET ORAL
Qty: 12 TABLET | Refills: 3 | Status: SHIPPED | OUTPATIENT
Start: 2024-02-26

## 2024-02-26 RX ORDER — ESTRADIOL 0.1 MG/G
2 CREAM VAGINAL
Qty: 42.5 G | Refills: 5 | Status: SHIPPED | OUTPATIENT
Start: 2024-02-26

## 2024-02-26 RX ORDER — MIRTAZAPINE 15 MG/1
15 TABLET, FILM COATED ORAL NIGHTLY
Qty: 90 TABLET | Refills: 3 | Status: SHIPPED | OUTPATIENT
Start: 2024-02-26

## 2024-02-26 RX ORDER — LISINOPRIL 30 MG/1
30 TABLET ORAL DAILY
Qty: 90 TABLET | Refills: 3 | Status: SHIPPED | OUTPATIENT
Start: 2024-02-26

## 2024-02-26 RX ORDER — LOVASTATIN 20 MG/1
20 TABLET ORAL NIGHTLY
Qty: 90 TABLET | Refills: 3 | Status: SHIPPED | OUTPATIENT
Start: 2024-02-26

## 2024-02-26 NOTE — PROGRESS NOTES
SUBJECTIVE:    Patient ID: Tanika Olivera is a 73 y.o. female.    Chief Complaint: Hypertension (Bottles brought//Pt is here for a check up and medication refills//ABDOUL )    Patient here for regular uetgta-pw-KLG/lipids/osteopenia    Pt reports overall overall she's been doing     continues to work out daily at the gym for 2 hours    Admits has not been monitoring blood pressure at home as her machine broke.    Alendronate dose was increased from 35-70 mg over the summer after DEXA showed worsening osteopenia, nearing osteoporosis        Telephone on 02/07/2024   Component Date Value Ref Range Status    WBC 02/14/2024 4.7  3.8 - 10.8 Thousand/uL Final    RBC 02/14/2024 4.63  3.80 - 5.10 Million/uL Final    Hemoglobin 02/14/2024 14.1  11.7 - 15.5 g/dL Final    Hematocrit 02/14/2024 42.8  35.0 - 45.0 % Final    MCV 02/14/2024 92.4  80.0 - 100.0 fL Final    MCH 02/14/2024 30.5  27.0 - 33.0 pg Final    MCHC 02/14/2024 32.9  32.0 - 36.0 g/dL Final    RDW 02/14/2024 13.0  11.0 - 15.0 % Final    Platelets 02/14/2024 306  140 - 400 Thousand/uL Final    MPV 02/14/2024 9.6  7.5 - 12.5 fL Final    Neutrophils, Abs 02/14/2024 2,317  1,500 - 7,800 cells/uL Final    Lymph # 02/14/2024 1,753  850 - 3,900 cells/uL Final    Mono # 02/14/2024 522  200 - 950 cells/uL Final    Eos # 02/14/2024 99  15 - 500 cells/uL Final    Baso # 02/14/2024 9  0 - 200 cells/uL Final    Neutrophils Relative 02/14/2024 49.3  % Final    Lymph % 02/14/2024 37.3  % Final    Mono % 02/14/2024 11.1  % Final    Eosinophil % 02/14/2024 2.1  % Final    Basophil % 02/14/2024 0.2  % Final    Glucose 02/14/2024 99  65 - 99 mg/dL Final    BUN 02/14/2024 15  7 - 25 mg/dL Final    Creatinine 02/14/2024 0.73  0.60 - 1.00 mg/dL Final    eGFR 02/14/2024 88  > OR = 60 mL/min/1.73m2 Final    BUN/Creatinine Ratio 02/14/2024 SEE NOTE:  6 - 22 (calc) Final    Sodium 02/14/2024 138  135 - 146 mmol/L Final    Potassium 02/14/2024 4.2  3.5 - 5.3 mmol/L Final    Chloride  02/14/2024 99  98 - 110 mmol/L Final    CO2 02/14/2024 31  20 - 32 mmol/L Final    Calcium 02/14/2024 10.1  8.6 - 10.4 mg/dL Final    Total Protein 02/14/2024 8.0  6.1 - 8.1 g/dL Final    Albumin 02/14/2024 4.8  3.6 - 5.1 g/dL Final    Globulin, Total 02/14/2024 3.2  1.9 - 3.7 g/dL (calc) Final    Albumin/Globulin Ratio 02/14/2024 1.5  1.0 - 2.5 (calc) Final    Total Bilirubin 02/14/2024 0.4  0.2 - 1.2 mg/dL Final    Alkaline Phosphatase 02/14/2024 37  37 - 153 U/L Final    AST 02/14/2024 26  10 - 35 U/L Final    ALT 02/14/2024 15  6 - 29 U/L Final    Cholesterol 02/14/2024 208 (H)  <200 mg/dL Final    HDL 02/14/2024 97  > OR = 50 mg/dL Final    Triglycerides 02/14/2024 84  <150 mg/dL Final    LDL Cholesterol 02/14/2024 94  mg/dL (calc) Final    HDL/Cholesterol Ratio 02/14/2024 2.1  <5.0 (calc) Final    Non HDL Chol. (LDL+VLDL) 02/14/2024 111  <130 mg/dL (calc) Final    Creatinine, Urine 02/14/2024 10 (L)  20 - 275 mg/dL Final    Microalb, Ur 02/14/2024 0.2  See Note: mg/dL Final    Microalb/Creat Ratio 02/14/2024 20  <30 mcg/mg creat Final    Color, UA 02/14/2024 YELLOW  YELLOW Final    Appearance, UA 02/14/2024 CLEAR  CLEAR Final    Specific Gravity, UA 02/14/2024 1.003  1.001 - 1.035 Final    pH, UA 02/14/2024 7.5  5.0 - 8.0 Final    Glucose, UA 02/14/2024 NEGATIVE  NEGATIVE Final    Bilirubin, UA 02/14/2024 NEGATIVE  NEGATIVE Final    Ketones, UA 02/14/2024 NEGATIVE  NEGATIVE Final    Occult Blood UA 02/14/2024 NEGATIVE  NEGATIVE Final    Protein, UA 02/14/2024 NEGATIVE  NEGATIVE Final    Nitrite, UA 02/14/2024 NEGATIVE  NEGATIVE Final    Leukocytes, UA 02/14/2024 NEGATIVE  NEGATIVE Final    WBC Casts, UA 02/14/2024 NONE SEEN  < OR = 5 /HPF Final    RBC Casts, UA 02/14/2024 NONE SEEN  < OR = 2 /HPF Final    Squam Epithel, UA 02/14/2024 NONE SEEN  < OR = 5 /HPF Final    Bacteria, UA 02/14/2024 NONE SEEN  NONE SEEN /HPF Final    Hyaline Casts, UA 02/14/2024 NONE SEEN  NONE SEEN /LPF Final    Service Cmt:  02/14/2024    Final    Reflexive Urine Culture 02/14/2024    Final    TSH w/reflex to FT4 02/14/2024 2.35  0.40 - 4.50 mIU/L Final       Past Medical History:   Diagnosis Date    History of hepatitis C, s/p successful Rx w/ SVR24 (cure) - 11/2018 2/23/2017    S/p epclusa w/ SVR    HTN (hypertension)     Hyperlipidemia     Osteoporosis      Past Surgical History:   Procedure Laterality Date    BREAST SURGERY  02/13/2007    Breast implants    COLONOSCOPY N/A 07/05/2022    Procedure: COLONOSCOPY;  Surgeon: Fran Palomares MD;  Location: [facility];  Service: Endoscopy;  Laterality: N/A;    EYE SURGERY  4/17/2019    Cataract surgery    TOTAL ABDOMINAL HYSTERECTOMY       Family History   Problem Relation Age of Onset    Hypertension Mother     Hyperlipidemia Mother     Stroke Father        All of your core healthy metrics are met.      The 10-year CVD risk score (Arelyino, et al., 2008) is: 12.7%    Values used to calculate the score:      Age: 73 years      Sex: Female      Diabetic: No      Tobacco smoker: No      Systolic Blood Pressure: 144 mmHg      Is BP treated: Yes      HDL Cholesterol: 97 mg/dL      Total Cholesterol: 208 mg/dL     Marital Status:   Alcohol History:  reports no history of alcohol use.  Tobacco History:  reports that she has never smoked. She has never been exposed to tobacco smoke. She has never used smokeless tobacco.  Drug History:  reports no history of drug use.    Health Maintenance Topics with due status: Not Due       Topic Last Completion Date    Colorectal Cancer Screening 07/05/2022    Mammogram 06/19/2023    DEXA Scan 06/19/2023    Lipid Panel 02/14/2024     Immunization History   Administered Date(s) Administered    COVID-19, MRNA, LN-S, PF (Pfizer) (Gray Cap) 06/11/2022    COVID-19, MRNA, LN-S, PF (Pfizer) (Purple Cap) 03/05/2021, 03/26/2021, 10/06/2021    Hepatitis A, Adult 03/02/2017, 09/05/2017    Influenza 12/28/2006, 11/30/2007, 10/24/2008, 11/20/2009, 11/05/2010,  10/24/2011, 11/15/2016, 10/31/2020    Influenza (FLUAD) - Quadrivalent - Adjuvanted - PF *Preferred* (65+) 10/02/2021    Influenza - High Dose - PF (65 years and older) 10/31/2018, 10/07/2019    Influenza - Quadrivalent - High Dose - PF (65 years and older) 09/10/2020    Influenza - Quadrivalent - PF *Preferred* (6 months and older) 10/23/2015, 11/15/2016, 10/26/2017    Influenza - Trivalent - PF (ADULT) 10/22/2012, 01/06/2014, 01/12/2015    Influenza A (H1N1) 2009 Monovalent - IM 11/20/2009    Pneumococcal Conjugate - 13 Valent 10/31/2018    Pneumococcal Polysaccharide - 23 Valent 01/06/2014, 11/07/2019    Zoster Recombinant 10/07/2019, 12/17/2019       Review of patient's allergies indicates:  No Known Allergies    Current Outpatient Medications:     calcium carbonate (OS-KYLAH) 500 mg calcium (1,250 mg) tablet, Take 1 tablet by mouth once daily., Disp: , Rfl:     cholecalciferol, vitamin D3, (VITAMIN D3) 2,000 unit Tab, 1 tablet, Disp: , Rfl:     fish oil-omega-3 fatty acids 300-1,000 mg capsule, Take 2 g by mouth once daily., Disp: , Rfl:     MULTIVITAMIN (MULTIPLE VITAMIN ORAL), Take 1 tablet by mouth once daily., Disp: , Rfl:     alendronate (FOSAMAX) 70 MG tablet, Take 1 tablet (70 mg total) by mouth every 7 days. take on empty stomach with full glass of water/do not eat or lie down for 1 hour after. For osteopenia, Disp: 12 tablet, Rfl: 3    estradioL (ESTRACE) 0.01 % (0.1 mg/gram) vaginal cream, Place 2 g vaginally every 7 days., Disp: 42.5 g, Rfl: 5    lisinopriL (PRINIVIL,ZESTRIL) 30 MG tablet, Take 1 tablet (30 mg total) by mouth once daily., Disp: 90 tablet, Rfl: 3    lovastatin (MEVACOR) 20 MG tablet, Take 1 tablet (20 mg total) by mouth every evening., Disp: 90 tablet, Rfl: 3    mirtazapine (REMERON) 15 MG tablet, Take 1 tablet (15 mg total) by mouth every evening., Disp: 90 tablet, Rfl: 3    Review of Systems   Constitutional:  Negative for appetite change, chills, fatigue, fever and unexpected weight  "change.   HENT:  Negative for sore throat and trouble swallowing.    Eyes:  Negative for visual disturbance.   Respiratory:  Negative for cough, shortness of breath and wheezing.    Cardiovascular:  Negative for chest pain, palpitations and leg swelling.   Gastrointestinal:  Negative for abdominal pain, blood in stool, constipation, diarrhea, nausea and vomiting.   Genitourinary:  Negative for difficulty urinating, dysuria, frequency and hematuria.   Musculoskeletal:  Negative for arthralgias, back pain and gait problem.   Neurological:  Negative for dizziness, tremors, seizures, numbness and headaches.   Psychiatric/Behavioral:  Negative for dysphoric mood, sleep disturbance (Stable on med) and suicidal ideas. The patient is not nervous/anxious.           Objective:      Vitals:    02/26/24 1422 02/26/24 1426 02/26/24 1510   BP: (!) 148/72 (!) 144/76 (!) 144/72   Pulse: 72     SpO2: 97%     Weight: 51.5 kg (113 lb 9.6 oz)     Height: 4' 11.25" (1.505 m)       Physical Exam  Vitals reviewed.   Constitutional:       General: She is not in acute distress.     Appearance: Normal appearance. She is well-developed and normal weight.      Comments: Healthy appearing white female, appears younger than stated age   HENT:      Head: Normocephalic and atraumatic.      Right Ear: Tympanic membrane and ear canal normal.      Left Ear: Tympanic membrane and ear canal normal.   Neck:      Vascular: No carotid bruit.   Cardiovascular:      Rate and Rhythm: Normal rate and regular rhythm.      Heart sounds: No murmur heard.     No friction rub. No gallop.   Pulmonary:      Effort: Pulmonary effort is normal. No respiratory distress.      Breath sounds: Normal breath sounds. No wheezing or rales.   Abdominal:      General: There is no distension.      Palpations: Abdomen is soft.      Tenderness: There is no abdominal tenderness.   Musculoskeletal:      Cervical back: Neck supple.      Right lower leg: No edema.      Left lower leg: " No edema.   Lymphadenopathy:      Cervical: No cervical adenopathy.   Skin:     General: Skin is warm and dry.      Findings: No rash.   Neurological:      General: No focal deficit present.      Mental Status: She is alert and oriented to person, place, and time.      Gait: Gait normal.   Psychiatric:         Mood and Affect: Mood normal.           Assessment:       1. Essential hypertension    2. Osteopenia of lumbar spine    3. Mixed hyperlipidemia    4. Primary insomnia    5. Atrophic vaginitis    6. Screening mammogram, encounter for           Plan:       1. Essential hypertension  -BP slightly elevated, recommend increasing lisinopril to 30 mg daily.  Monitor BP at home and return in 2 weeks for recheck  -     lisinopriL (PRINIVIL,ZESTRIL) 30 MG tablet; Take 1 tablet (30 mg total) by mouth once daily.  Dispense: 90 tablet; Refill: 3    2. Osteopenia of lumbar spine   -alendronate dose increased last year, will complete 5 years of treatment in 2026  Overview:  Started alendronate 2021    Orders:  -     alendronate (FOSAMAX) 70 MG tablet; Take 1 tablet (70 mg total) by mouth every 7 days. take on empty stomach with full glass of water/do not eat or lie down for 1 hour after. For osteopenia  Dispense: 12 tablet; Refill: 3    3. Mixed hyperlipidemia  -reviewed recent labs with patient, well controlled  -     lovastatin (MEVACOR) 20 MG tablet; Take 1 tablet (20 mg total) by mouth every evening.  Dispense: 90 tablet; Refill: 3    4. Primary insomnia  -stable  -     mirtazapine (REMERON) 15 MG tablet; Take 1 tablet (15 mg total) by mouth every evening.  Dispense: 90 tablet; Refill: 3    5. Atrophic vaginitis  -stable  -     estradioL (ESTRACE) 0.01 % (0.1 mg/gram) vaginal cream; Place 2 g vaginally every 7 days.  Dispense: 42.5 g; Refill: 5    6. Screening mammogram, encounter for  -     Mammo Digital Screening [facility]; Future; Expected date: 06/20/2024      Follow up in about 6 months (around 8/26/2024) for  HTN, nurse visit in 2 weeks for BP check.          Counseled on age and gender appropriate medical preventative services, including cancer screenings, immunizations, overall nutritional health, need for a consistent exercise regimen and an overall push towards maintaining a vigorous and active lifestyle.      2/26/2024 Jennyfer Hernandez NP

## 2024-03-14 ENCOUNTER — CLINICAL SUPPORT (OUTPATIENT)
Dept: FAMILY MEDICINE | Facility: CLINIC | Age: 74
End: 2024-03-14
Payer: MEDICARE

## 2024-03-14 VITALS — DIASTOLIC BLOOD PRESSURE: 86 MMHG | SYSTOLIC BLOOD PRESSURE: 128 MMHG

## 2024-03-14 DIAGNOSIS — I10 ESSENTIAL HYPERTENSION: Primary | ICD-10-CM

## 2024-03-14 NOTE — PROGRESS NOTES
Pt came in for BP check. Pt is taking lisinopril 30 mg daily. Pt stated BP has been good at home. Let pt per Jennyfer to continue and monitor BP at home. Pt verbalized understanding

## 2024-06-20 LAB — BCS RECOMMENDATION EXT: NORMAL

## 2024-06-25 ENCOUNTER — HOSPITAL ENCOUNTER (EMERGENCY)
Facility: HOSPITAL | Age: 74
Discharge: ANOTHER HOSPITAL | End: 2024-06-25
Attending: STUDENT IN AN ORGANIZED HEALTH CARE EDUCATION/TRAINING PROGRAM | Admitting: INTERNAL MEDICINE
Payer: MEDICARE

## 2024-06-25 ENCOUNTER — HOSPITAL ENCOUNTER (INPATIENT)
Facility: HOSPITAL | Age: 74
LOS: 7 days | Discharge: HOME | DRG: 220 | End: 2024-07-02
Attending: THORACIC SURGERY (CARDIOTHORACIC VASCULAR SURGERY) | Admitting: THORACIC SURGERY (CARDIOTHORACIC VASCULAR SURGERY)
Payer: MEDICARE

## 2024-06-25 VITALS
RESPIRATION RATE: 22 BRPM | DIASTOLIC BLOOD PRESSURE: 53 MMHG | SYSTOLIC BLOOD PRESSURE: 98 MMHG | BODY MASS INDEX: 22.18 KG/M2 | WEIGHT: 110 LBS | HEIGHT: 59 IN | OXYGEN SATURATION: 99 % | HEART RATE: 68 BPM | TEMPERATURE: 98 F

## 2024-06-25 DIAGNOSIS — R11.0 NAUSEA: ICD-10-CM

## 2024-06-25 DIAGNOSIS — E78.2 MIXED HYPERLIPIDEMIA: Chronic | ICD-10-CM

## 2024-06-25 DIAGNOSIS — R42 VERTIGO: ICD-10-CM

## 2024-06-25 DIAGNOSIS — I71.010 TYPE 1 DISSECTION OF ASCENDING AORTA: ICD-10-CM

## 2024-06-25 DIAGNOSIS — I71.010: ICD-10-CM

## 2024-06-25 DIAGNOSIS — I71.010: Primary | ICD-10-CM

## 2024-06-25 DIAGNOSIS — R73.9 STRESS HYPERGLYCEMIA: ICD-10-CM

## 2024-06-25 DIAGNOSIS — Z98.890 HISTORY OF CARDIOVASCULAR SURGERY: ICD-10-CM

## 2024-06-25 DIAGNOSIS — I71.010 DISSECTION OF ASCENDING AORTA: Primary | ICD-10-CM

## 2024-06-25 DIAGNOSIS — R03.0 ELEVATED BLOOD PRESSURE READING: ICD-10-CM

## 2024-06-25 DIAGNOSIS — I47.20 V TACH: ICD-10-CM

## 2024-06-25 DIAGNOSIS — I71.00 AORTIC DISSECTION: ICD-10-CM

## 2024-06-25 DIAGNOSIS — R07.9 CHEST PAIN: ICD-10-CM

## 2024-06-25 LAB
ABO + RH BLD: NORMAL
ABO + RH BLD: NORMAL
ALBUMIN SERPL BCP-MCNC: 4.2 G/DL (ref 3.5–5.2)
ALP SERPL-CCNC: 73 U/L (ref 55–135)
ALT SERPL W/O P-5'-P-CCNC: 11 U/L (ref 10–44)
AMORPH CRY URNS QL MICRO: ABNORMAL
ANION GAP SERPL CALC-SCNC: 11 MMOL/L (ref 8–16)
AST SERPL-CCNC: 20 U/L (ref 10–40)
BACTERIA #/AREA URNS HPF: ABNORMAL /HPF
BASOPHILS # BLD AUTO: 0.01 K/UL (ref 0–0.2)
BASOPHILS NFR BLD: 0.1 % (ref 0–1.9)
BILIRUB SERPL-MCNC: 0.5 MG/DL (ref 0.1–1)
BILIRUB UR QL STRIP: NEGATIVE
BLD GP AB SCN CELLS X3 SERPL QL: NORMAL
BLD GP AB SCN CELLS X3 SERPL QL: NORMAL
BNP SERPL-MCNC: 108 PG/ML (ref 0–99)
BUN SERPL-MCNC: 13 MG/DL (ref 8–23)
CALCIUM SERPL-MCNC: 9.4 MG/DL (ref 8.7–10.5)
CHLORIDE SERPL-SCNC: 96 MMOL/L (ref 95–110)
CLARITY UR: ABNORMAL
CO2 SERPL-SCNC: 24 MMOL/L (ref 23–29)
COLOR UR: ABNORMAL
CREAT SERPL-MCNC: 0.5 MG/DL (ref 0.5–1.4)
DIFFERENTIAL METHOD BLD: ABNORMAL
EOSINOPHIL # BLD AUTO: 0 K/UL (ref 0–0.5)
EOSINOPHIL NFR BLD: 0 % (ref 0–8)
ERYTHROCYTE [DISTWIDTH] IN BLOOD BY AUTOMATED COUNT: 12.4 % (ref 11.5–14.5)
EST. GFR  (NO RACE VARIABLE): >60 ML/MIN/1.73 M^2
GLUCOSE SERPL-MCNC: 125 MG/DL (ref 70–110)
GLUCOSE UR QL STRIP: NEGATIVE
HCT VFR BLD AUTO: 39.9 % (ref 37–48.5)
HGB BLD-MCNC: 13.7 G/DL (ref 12–16)
HGB UR QL STRIP: NEGATIVE
HYALINE CASTS #/AREA URNS LPF: 0 /LPF
IMM GRANULOCYTES # BLD AUTO: 0.03 K/UL (ref 0–0.04)
IMM GRANULOCYTES NFR BLD AUTO: 0.3 % (ref 0–0.5)
KETONES UR QL STRIP: ABNORMAL
LEUKOCYTE ESTERASE UR QL STRIP: NEGATIVE
LYMPHOCYTES # BLD AUTO: 0.7 K/UL (ref 1–4.8)
LYMPHOCYTES NFR BLD: 7.3 % (ref 18–48)
MAGNESIUM SERPL-MCNC: 2.1 MG/DL (ref 1.6–2.6)
MCH RBC QN AUTO: 30.2 PG (ref 27–31)
MCHC RBC AUTO-ENTMCNC: 34.3 G/DL (ref 32–36)
MCV RBC AUTO: 88 FL (ref 82–98)
MICROSCOPIC COMMENT: ABNORMAL
MONOCYTES # BLD AUTO: 0.3 K/UL (ref 0.3–1)
MONOCYTES NFR BLD: 3.4 % (ref 4–15)
NEUTROPHILS # BLD AUTO: 8.8 K/UL (ref 1.8–7.7)
NEUTROPHILS NFR BLD: 88.9 % (ref 38–73)
NITRITE UR QL STRIP: NEGATIVE
NRBC BLD-RTO: 0 /100 WBC
PH UR STRIP: 8 [PH] (ref 5–8)
PLATELET # BLD AUTO: 436 K/UL (ref 150–450)
PMV BLD AUTO: 8.7 FL (ref 9.2–12.9)
POTASSIUM SERPL-SCNC: 3.5 MMOL/L (ref 3.5–5.1)
PROT SERPL-MCNC: 8.6 G/DL (ref 6–8.4)
PROT UR QL STRIP: ABNORMAL
RBC # BLD AUTO: 4.54 M/UL (ref 4–5.4)
RBC #/AREA URNS HPF: 8 /HPF (ref 0–4)
SODIUM SERPL-SCNC: 131 MMOL/L (ref 136–145)
SP GR UR STRIP: 1.01 (ref 1–1.03)
SPECIMEN OUTDATE: NORMAL
SPECIMEN OUTDATE: NORMAL
SQUAMOUS #/AREA URNS HPF: 7 /HPF
TROPONIN I SERPL HS-MCNC: 7.5 PG/ML (ref 0–14.9)
TROPONIN I SERPL HS-MCNC: 8.3 PG/ML (ref 0–14.9)
URN SPEC COLLECT METH UR: ABNORMAL
UROBILINOGEN UR STRIP-ACNC: NEGATIVE EU/DL
WBC # BLD AUTO: 9.84 K/UL (ref 3.9–12.7)
WBC #/AREA URNS HPF: 0 /HPF (ref 0–5)

## 2024-06-25 PROCEDURE — 25500020 PHARM REV CODE 255: Performed by: STUDENT IN AN ORGANIZED HEALTH CARE EDUCATION/TRAINING PROGRAM

## 2024-06-25 PROCEDURE — 87086 URINE CULTURE/COLONY COUNT: CPT | Performed by: STUDENT IN AN ORGANIZED HEALTH CARE EDUCATION/TRAINING PROGRAM

## 2024-06-25 PROCEDURE — 83735 ASSAY OF MAGNESIUM: CPT | Performed by: STUDENT IN AN ORGANIZED HEALTH CARE EDUCATION/TRAINING PROGRAM

## 2024-06-25 PROCEDURE — 63600175 PHARM REV CODE 636 W HCPCS

## 2024-06-25 PROCEDURE — 96375 TX/PRO/DX INJ NEW DRUG ADDON: CPT

## 2024-06-25 PROCEDURE — 96366 THER/PROPH/DIAG IV INF ADDON: CPT

## 2024-06-25 PROCEDURE — 36415 COLL VENOUS BLD VENIPUNCTURE: CPT | Performed by: STUDENT IN AN ORGANIZED HEALTH CARE EDUCATION/TRAINING PROGRAM

## 2024-06-25 PROCEDURE — 96374 THER/PROPH/DIAG INJ IV PUSH: CPT | Mod: 59

## 2024-06-25 PROCEDURE — 25000003 PHARM REV CODE 250

## 2024-06-25 PROCEDURE — 85025 COMPLETE CBC W/AUTO DIFF WBC: CPT | Performed by: STUDENT IN AN ORGANIZED HEALTH CARE EDUCATION/TRAINING PROGRAM

## 2024-06-25 PROCEDURE — 86920 COMPATIBILITY TEST SPIN: CPT | Performed by: STUDENT IN AN ORGANIZED HEALTH CARE EDUCATION/TRAINING PROGRAM

## 2024-06-25 PROCEDURE — 93005 ELECTROCARDIOGRAM TRACING: CPT | Performed by: INTERNAL MEDICINE

## 2024-06-25 PROCEDURE — 86850 RBC ANTIBODY SCREEN: CPT | Performed by: STUDENT IN AN ORGANIZED HEALTH CARE EDUCATION/TRAINING PROGRAM

## 2024-06-25 PROCEDURE — 20000000 HC ICU ROOM

## 2024-06-25 PROCEDURE — 36620 INSERTION CATHETER ARTERY: CPT

## 2024-06-25 PROCEDURE — 86901 BLOOD TYPING SEROLOGIC RH(D): CPT | Mod: 91

## 2024-06-25 PROCEDURE — 99291 CRITICAL CARE FIRST HOUR: CPT

## 2024-06-25 PROCEDURE — 25000003 PHARM REV CODE 250: Performed by: STUDENT IN AN ORGANIZED HEALTH CARE EDUCATION/TRAINING PROGRAM

## 2024-06-25 PROCEDURE — 99900035 HC TECH TIME PER 15 MIN (STAT)

## 2024-06-25 PROCEDURE — 86900 BLOOD TYPING SEROLOGIC ABO: CPT | Performed by: STUDENT IN AN ORGANIZED HEALTH CARE EDUCATION/TRAINING PROGRAM

## 2024-06-25 PROCEDURE — 63600175 PHARM REV CODE 636 W HCPCS: Performed by: INTERNAL MEDICINE

## 2024-06-25 PROCEDURE — 96361 HYDRATE IV INFUSION ADD-ON: CPT

## 2024-06-25 PROCEDURE — 84484 ASSAY OF TROPONIN QUANT: CPT | Performed by: STUDENT IN AN ORGANIZED HEALTH CARE EDUCATION/TRAINING PROGRAM

## 2024-06-25 PROCEDURE — 86901 BLOOD TYPING SEROLOGIC RH(D): CPT | Performed by: STUDENT IN AN ORGANIZED HEALTH CARE EDUCATION/TRAINING PROGRAM

## 2024-06-25 PROCEDURE — 63600175 PHARM REV CODE 636 W HCPCS: Performed by: STUDENT IN AN ORGANIZED HEALTH CARE EDUCATION/TRAINING PROGRAM

## 2024-06-25 PROCEDURE — 81001 URINALYSIS AUTO W/SCOPE: CPT | Performed by: STUDENT IN AN ORGANIZED HEALTH CARE EDUCATION/TRAINING PROGRAM

## 2024-06-25 PROCEDURE — 93010 ELECTROCARDIOGRAM REPORT: CPT | Mod: ,,, | Performed by: INTERNAL MEDICINE

## 2024-06-25 PROCEDURE — 83880 ASSAY OF NATRIURETIC PEPTIDE: CPT | Performed by: STUDENT IN AN ORGANIZED HEALTH CARE EDUCATION/TRAINING PROGRAM

## 2024-06-25 PROCEDURE — 25000003 PHARM REV CODE 250: Performed by: INTERNAL MEDICINE

## 2024-06-25 PROCEDURE — 96365 THER/PROPH/DIAG IV INF INIT: CPT

## 2024-06-25 PROCEDURE — 80053 COMPREHEN METABOLIC PANEL: CPT | Performed by: STUDENT IN AN ORGANIZED HEALTH CARE EDUCATION/TRAINING PROGRAM

## 2024-06-25 RX ORDER — SODIUM,POTASSIUM PHOSPHATES 280-250MG
2 POWDER IN PACKET (EA) ORAL
Status: DISCONTINUED | OUTPATIENT
Start: 2024-06-25 | End: 2024-06-27

## 2024-06-25 RX ORDER — ACETAMINOPHEN 325 MG/1
650 TABLET ORAL EVERY 4 HOURS PRN
Status: DISCONTINUED | OUTPATIENT
Start: 2024-06-25 | End: 2024-06-27

## 2024-06-25 RX ORDER — ALUMINUM HYDROXIDE, MAGNESIUM HYDROXIDE, AND SIMETHICONE 1200; 120; 1200 MG/30ML; MG/30ML; MG/30ML
30 SUSPENSION ORAL 4 TIMES DAILY PRN
Status: DISCONTINUED | OUTPATIENT
Start: 2024-06-25 | End: 2024-06-25 | Stop reason: HOSPADM

## 2024-06-25 RX ORDER — ONDANSETRON 8 MG/1
8 TABLET, ORALLY DISINTEGRATING ORAL EVERY 8 HOURS PRN
Status: DISCONTINUED | OUTPATIENT
Start: 2024-06-25 | End: 2024-06-27

## 2024-06-25 RX ORDER — SODIUM,POTASSIUM PHOSPHATES 280-250MG
2 POWDER IN PACKET (EA) ORAL
Status: DISCONTINUED | OUTPATIENT
Start: 2024-06-25 | End: 2024-06-25 | Stop reason: HOSPADM

## 2024-06-25 RX ORDER — ESMOLOL HYDROCHLORIDE 20 MG/ML
0-300 INJECTION, SOLUTION INTRAVENOUS CONTINUOUS
Status: DISCONTINUED | OUTPATIENT
Start: 2024-06-25 | End: 2024-06-27

## 2024-06-25 RX ORDER — MECLIZINE HCL 12.5 MG 12.5 MG/1
50 TABLET ORAL
Status: DISCONTINUED | OUTPATIENT
Start: 2024-06-25 | End: 2024-06-25

## 2024-06-25 RX ORDER — LABETALOL HYDROCHLORIDE 5 MG/ML
2.5 INJECTION, SOLUTION INTRAVENOUS ONCE
Status: DISCONTINUED | OUTPATIENT
Start: 2024-06-25 | End: 2024-06-25 | Stop reason: HOSPADM

## 2024-06-25 RX ORDER — LANOLIN ALCOHOL/MO/W.PET/CERES
800 CREAM (GRAM) TOPICAL
Status: DISCONTINUED | OUTPATIENT
Start: 2024-06-25 | End: 2024-06-27

## 2024-06-25 RX ORDER — PROMETHAZINE HYDROCHLORIDE 25 MG/1
25 TABLET ORAL EVERY 6 HOURS PRN
Status: DISCONTINUED | OUTPATIENT
Start: 2024-06-25 | End: 2024-06-26

## 2024-06-25 RX ORDER — FAMOTIDINE 20 MG/1
20 TABLET, FILM COATED ORAL 2 TIMES DAILY
Status: DISCONTINUED | OUTPATIENT
Start: 2024-06-25 | End: 2024-06-25 | Stop reason: HOSPADM

## 2024-06-25 RX ORDER — TALC
6 POWDER (GRAM) TOPICAL NIGHTLY PRN
Status: DISCONTINUED | OUTPATIENT
Start: 2024-06-25 | End: 2024-06-25 | Stop reason: HOSPADM

## 2024-06-25 RX ORDER — NALOXONE HCL 0.4 MG/ML
0.02 VIAL (ML) INJECTION
Status: DISCONTINUED | OUTPATIENT
Start: 2024-06-25 | End: 2024-06-25 | Stop reason: HOSPADM

## 2024-06-25 RX ORDER — ESMOLOL HYDROCHLORIDE 20 MG/ML
0-300 INJECTION, SOLUTION INTRAVENOUS CONTINUOUS
Status: DISCONTINUED | OUTPATIENT
Start: 2024-06-25 | End: 2024-06-25

## 2024-06-25 RX ORDER — NICARDIPINE HYDROCHLORIDE 0.2 MG/ML
0-15 INJECTION INTRAVENOUS CONTINUOUS
Status: DISCONTINUED | OUTPATIENT
Start: 2024-06-25 | End: 2024-06-27

## 2024-06-25 RX ORDER — HYDROCODONE BITARTRATE AND ACETAMINOPHEN 5; 325 MG/1; MG/1
1 TABLET ORAL EVERY 6 HOURS PRN
Status: DISCONTINUED | OUTPATIENT
Start: 2024-06-25 | End: 2024-06-25 | Stop reason: HOSPADM

## 2024-06-25 RX ORDER — LANOLIN ALCOHOL/MO/W.PET/CERES
800 CREAM (GRAM) TOPICAL
Status: DISCONTINUED | OUTPATIENT
Start: 2024-06-25 | End: 2024-06-25 | Stop reason: HOSPADM

## 2024-06-25 RX ORDER — HYDROCODONE BITARTRATE AND ACETAMINOPHEN 500; 5 MG/1; MG/1
TABLET ORAL
Status: DISCONTINUED | OUTPATIENT
Start: 2024-06-25 | End: 2024-06-25 | Stop reason: HOSPADM

## 2024-06-25 RX ORDER — ACETAMINOPHEN 325 MG/1
650 TABLET ORAL EVERY 8 HOURS PRN
Status: DISCONTINUED | OUTPATIENT
Start: 2024-06-25 | End: 2024-06-25 | Stop reason: HOSPADM

## 2024-06-25 RX ORDER — ONDANSETRON HYDROCHLORIDE 2 MG/ML
4 INJECTION, SOLUTION INTRAVENOUS
Status: COMPLETED | OUTPATIENT
Start: 2024-06-25 | End: 2024-06-25

## 2024-06-25 RX ORDER — ACETAMINOPHEN 325 MG/1
650 TABLET ORAL EVERY 4 HOURS PRN
Status: DISCONTINUED | OUTPATIENT
Start: 2024-06-25 | End: 2024-06-25 | Stop reason: HOSPADM

## 2024-06-25 RX ORDER — MECLIZINE HCL 12.5 MG 12.5 MG/1
25 TABLET ORAL
Status: COMPLETED | OUTPATIENT
Start: 2024-06-25 | End: 2024-06-25

## 2024-06-25 RX ORDER — LABETALOL HYDROCHLORIDE 5 MG/ML
10 INJECTION, SOLUTION INTRAVENOUS
Status: COMPLETED | OUTPATIENT
Start: 2024-06-25 | End: 2024-06-25

## 2024-06-25 RX ORDER — SODIUM CHLORIDE, SODIUM LACTATE, POTASSIUM CHLORIDE, CALCIUM CHLORIDE 600; 310; 30; 20 MG/100ML; MG/100ML; MG/100ML; MG/100ML
INJECTION, SOLUTION INTRAVENOUS CONTINUOUS
Status: DISCONTINUED | OUTPATIENT
Start: 2024-06-25 | End: 2024-06-26

## 2024-06-25 RX ORDER — NICARDIPINE HYDROCHLORIDE 0.2 MG/ML
0-15 INJECTION INTRAVENOUS CONTINUOUS
Status: DISCONTINUED | OUTPATIENT
Start: 2024-06-25 | End: 2024-06-25 | Stop reason: HOSPADM

## 2024-06-25 RX ORDER — ONDANSETRON HYDROCHLORIDE 2 MG/ML
4 INJECTION, SOLUTION INTRAVENOUS EVERY 6 HOURS PRN
Status: DISCONTINUED | OUTPATIENT
Start: 2024-06-25 | End: 2024-06-25 | Stop reason: HOSPADM

## 2024-06-25 RX ORDER — METOCLOPRAMIDE HYDROCHLORIDE 5 MG/ML
10 INJECTION INTRAMUSCULAR; INTRAVENOUS
Status: COMPLETED | OUTPATIENT
Start: 2024-06-25 | End: 2024-06-25

## 2024-06-25 RX ORDER — OXYCODONE HYDROCHLORIDE 5 MG/1
5 TABLET ORAL EVERY 4 HOURS PRN
Status: DISCONTINUED | OUTPATIENT
Start: 2024-06-25 | End: 2024-06-26

## 2024-06-25 RX ADMIN — MECLIZINE HYDROCHLORIDE 25 MG: 12.5 TABLET ORAL at 12:06

## 2024-06-25 RX ADMIN — CEFTRIAXONE SODIUM 1 G: 1 INJECTION, POWDER, FOR SOLUTION INTRAMUSCULAR; INTRAVENOUS at 05:06

## 2024-06-25 RX ADMIN — ONDANSETRON 8 MG: 8 TABLET, ORALLY DISINTEGRATING ORAL at 09:06

## 2024-06-25 RX ADMIN — LABETALOL HYDROCHLORIDE 10 MG: 5 INJECTION, SOLUTION INTRAVENOUS at 02:06

## 2024-06-25 RX ADMIN — LABETALOL HYDROCHLORIDE 0.5 MG/MIN: 5 INJECTION, SOLUTION INTRAVENOUS at 02:06

## 2024-06-25 RX ADMIN — NICARDIPINE HYDROCHLORIDE 5 MG/HR: 0.2 INJECTION INTRAVENOUS at 05:06

## 2024-06-25 RX ADMIN — MECLIZINE HYDROCHLORIDE 25 MG: 12.5 TABLET ORAL at 05:06

## 2024-06-25 RX ADMIN — ONDANSETRON 4 MG: 2 INJECTION INTRAMUSCULAR; INTRAVENOUS at 11:06

## 2024-06-25 RX ADMIN — SODIUM CHLORIDE 1000 ML: 9 INJECTION, SOLUTION INTRAVENOUS at 12:06

## 2024-06-25 RX ADMIN — METOCLOPRAMIDE 10 MG: 5 INJECTION, SOLUTION INTRAMUSCULAR; INTRAVENOUS at 01:06

## 2024-06-25 RX ADMIN — SODIUM CHLORIDE, POTASSIUM CHLORIDE, SODIUM LACTATE AND CALCIUM CHLORIDE: 600; 310; 30; 20 INJECTION, SOLUTION INTRAVENOUS at 08:06

## 2024-06-25 RX ADMIN — POTASSIUM BICARBONATE 50 MEQ: 978 TABLET, EFFERVESCENT ORAL at 08:06

## 2024-06-25 RX ADMIN — IOHEXOL 100 ML: 350 INJECTION, SOLUTION INTRAVENOUS at 01:06

## 2024-06-25 RX ADMIN — NICARDIPINE HYDROCHLORIDE 12.5 MG/HR: 0.2 INJECTION, SOLUTION INTRAVENOUS at 08:06

## 2024-06-25 NOTE — DISCHARGE SUMMARY
[facility] - Emergency Dept  Hospital Medicine  Discharge Summary      Patient Name: Tanika Olivera  MRN: [id number]  GLORIA: [id number]  Patient Class: Emergency  Admission Date: 6/25/2024  Hospital Length of Stay: 0 days  Discharge Date and Time:  06/25/2024 6:36 PM  Attending Physician: No att. providers found   Discharging Provider: Jenaro Nicole MD  Primary Care Provider: Esdras Green MD    Primary Care Team: Networked reference to record PCT     HPI:   73 year old pt getting admitted with Type A dissection of Aorta  Pt was smoking weed yesterday night and felt dizzy  Dizziness continued today with vertigo like symptoms  Also c/o dry heaves. 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    Dissection extending into the right brachiocephalic, and proximal most right subclavian and right vertebral arteries.   Also Continuation of the dissection into the right common carotid, carotid bulb and proximal most right internal/external carotid arteries.     * No surgery found *      Hospital Course:   73 year old pt got admitted with Type A dissection of Aorta   CTA head/neck showed Aneurysmal dilation of the ascending thoracic aorta with partially thrombosed ascending aortic dissection (Constantin type A).    Dissection extending into the right brachiocephalic, and proximal most right subclavian and right vertebral arteries.   Also Continuation of the dissection into the right common carotid, carotid bulb and proximal most right internal/external carotid arteries.   Unable to do CTA Chest today because pt already received iv contrast  Pt was evaluated by CTS MD here and decision was made to transfer pt to [facility]        Goals of Care Treatment Preferences:  Code Status: Full Code      Consults:     No new Assessment & Plan notes have been filed  under this hospital service since the last note was generated.  Service: Hospital Medicine    Final Active Diagnoses:    Diagnosis Date Noted POA    PRINCIPAL PROBLEM:  Type 1 dissection of thoracic aorta [I71.010] 06/25/2024 Yes    Hypertension [I10] 11/07/2019 Yes      Problems Resolved During this Admission:       Discharged Condition: good    Disposition: Short Term Hospital    Follow Up:    Patient Instructions:   No discharge procedures on file.        Pending Diagnostic Studies:       None            Indwelling Lines/Drains at time of discharge:   Lines/Drains/Airways       None                          Jenaro Nicole MD  Department of Hospital Medicine  [facility] - Emergency Dept

## 2024-06-25 NOTE — SUBJECTIVE & OBJECTIVE
Past Medical History:   Diagnosis Date    History of hepatitis C, s/p successful Rx w/ SVR24 (cure) - 11/2018 2/23/2017    S/p epclusa w/ SVR    HTN (hypertension)     Hyperlipidemia     Osteoporosis        Past Surgical History:   Procedure Laterality Date    BREAST SURGERY  02/13/2007    Breast implants    COLONOSCOPY N/A 07/05/2022    Procedure: COLONOSCOPY;  Surgeon: Fran Palomares MD;  Location: [facility];  Service: Endoscopy;  Laterality: N/A;    EYE SURGERY  4/17/2019    Cataract surgery    TOTAL ABDOMINAL HYSTERECTOMY         Review of patient's allergies indicates:  No Known Allergies    No current facility-administered medications on file prior to encounter.     Current Outpatient Medications on File Prior to Encounter   Medication Sig    alendronate (FOSAMAX) 70 MG tablet Take 1 tablet (70 mg total) by mouth every 7 days. take on empty stomach with full glass of water/do not eat or lie down for 1 hour after. For osteopenia    calcium carbonate (OS-KYLAH) 500 mg calcium (1,250 mg) tablet Take 1 tablet by mouth once daily.    cholecalciferol, vitamin D3, (VITAMIN D3) 2,000 unit Tab Take 1 tablet by mouth once daily.    fish oil-omega-3 fatty acids 300-1,000 mg capsule Take 2 g by mouth once daily.    lisinopriL (PRINIVIL,ZESTRIL) 30 MG tablet Take 1 tablet (30 mg total) by mouth once daily.    lovastatin (MEVACOR) 20 MG tablet Take 1 tablet (20 mg total) by mouth every evening.    mirtazapine (REMERON) 15 MG tablet Take 1 tablet (15 mg total) by mouth every evening.    MULTIVITAMIN (MULTIPLE VITAMIN ORAL) Take 1 tablet by mouth once daily.    estradioL (ESTRACE) 0.01 % (0.1 mg/gram) vaginal cream Place 2 g vaginally every 7 days.     Family History       Problem Relation (Age of Onset)    Hyperlipidemia Mother    Hypertension Mother    Stroke Father          Tobacco Use    Smoking status: Never     Passive exposure: Never    Smokeless tobacco: Never   Substance and Sexual Activity    Alcohol use: No     Drug use: No    Sexual activity: Not Currently     Partners: Male     Birth control/protection: None     Review of Systems   Constitutional:  Positive for fatigue. Negative for activity change and appetite change.   HENT:  Negative for congestion and dental problem.    Eyes:  Negative for discharge and itching.   Respiratory:  Negative for shortness of breath.    Cardiovascular:  Negative for chest pain.   Gastrointestinal:  Negative for abdominal distention and abdominal pain.   Endocrine: Negative for cold intolerance.   Genitourinary:  Negative for difficulty urinating and dysuria.   Musculoskeletal:  Negative for arthralgias and back pain.   Skin:  Negative for color change.   Neurological:  Positive for dizziness. Negative for facial asymmetry.   Hematological:  Negative for adenopathy.   Psychiatric/Behavioral:  Negative for agitation and behavioral problems.      Objective:     Vital Signs (Most Recent):  Temp: 98 °F (36.7 °C) (06/25/24 1047)  Pulse: 66 (06/25/24 1500)  Resp: 19 (06/25/24 1500)  BP: 136/74 (06/25/24 1500)  SpO2: 98 % (06/25/24 1500) Vital Signs (24h Range):  Temp:  [98 °F (36.7 °C)] 98 °F (36.7 °C)  Pulse:  [66-86] 66  Resp:  [15-24] 19  SpO2:  [97 %-100 %] 98 %  BP: (134-192)/(72-93) 136/74     Weight: 49.9 kg (110 lb)  Body mass index is 22.22 kg/m².     Physical Exam  Vitals and nursing note reviewed.   Constitutional:       General: She is not in acute distress.  HENT:      Head: Atraumatic.      Right Ear: External ear normal.      Left Ear: External ear normal.      Nose: Nose normal.      Mouth/Throat:      Mouth: Mucous membranes are moist.   Cardiovascular:      Rate and Rhythm: Normal rate.   Pulmonary:      Effort: Pulmonary effort is normal.   Abdominal:      Palpations: Abdomen is soft.   Musculoskeletal:         General: Normal range of motion.      Cervical back: Normal range of motion.   Skin:     General: Skin is warm.   Neurological:      Mental Status: She is alert and  oriented to person, place, and time.   Psychiatric:         Behavior: Behavior normal.                Significant Labs: All pertinent labs within the past 24 hours have been reviewed.  CBC:   Recent Labs   Lab 06/25/24  1134   WBC 9.84   HGB 13.7   HCT 39.9        CMP:   Recent Labs   Lab 06/25/24  1134   *   K 3.5   CL 96   CO2 24   *   BUN 13   CREATININE 0.5   CALCIUM 9.4   PROT 8.6*   ALBUMIN 4.2   BILITOT 0.5   ALKPHOS 73   AST 20   ALT 11   ANIONGAP 11       Significant Imaging: I have reviewed all pertinent imaging results/findings within the past 24 hours.

## 2024-06-25 NOTE — ED PROVIDER NOTES
Encounter Date: 6/25/2024       History     Chief Complaint   Patient presents with    Dizziness     Started last night after smoking weed, still dizzy today.  Also nauseated, but no vomiting.     73-year-old female presents with dizziness.  Onset last night.  Associated nausea.  No trauma, fever or chills.    The history is provided by the patient.     Review of patient's allergies indicates:  No Known Allergies  Past Medical History:   Diagnosis Date    History of hepatitis C, s/p successful Rx w/ SVR24 (cure) - 11/2018 2/23/2017    S/p epclusa w/ SVR    HTN (hypertension)     Hyperlipidemia     Osteoporosis      Past Surgical History:   Procedure Laterality Date    BREAST SURGERY  02/13/2007    Breast implants    COLONOSCOPY N/A 07/05/2022    Procedure: COLONOSCOPY;  Surgeon: Fran Palomares MD;  Location: [facility];  Service: Endoscopy;  Laterality: N/A;    EYE SURGERY  4/17/2019    Cataract surgery    TOTAL ABDOMINAL HYSTERECTOMY       Family History   Problem Relation Name Age of Onset    Hypertension Mother      Hyperlipidemia Mother      Stroke Father       Social History     Tobacco Use    Smoking status: Never     Passive exposure: Never    Smokeless tobacco: Never   Substance Use Topics    Alcohol use: No    Drug use: No     Review of Systems   All other systems reviewed and are negative.      Physical Exam     Initial Vitals [06/25/24 1047]   BP Pulse Resp Temp SpO2   (!) 158/92 72 18 98 °F (36.7 °C) 99 %      MAP       --         Physical Exam    Nursing note and vitals reviewed.  Constitutional:   Patient appears nauseous on exam   HENT:   Head: Normocephalic.   Eyes: No scleral icterus.   Cardiovascular:  Normal rate.           Pulmonary/Chest: Effort normal. No stridor. No respiratory distress.   Abdominal: There is no guarding.     Neurological: She is alert.   Finger-to-nose, heel to shin and alternating hand movements symmetrical.  No focal extremity deficit   Skin: No rash noted. No erythema.          ED Course   Critical Care    Date/Time: 6/25/2024 4:13 PM    Performed by: uSdhir Monge Jr., DO  Authorized by: Sudhir Monge Jr., DO  Direct patient critical care time: 35 minutes  Additional history critical care time: 10 minutes  Ordering / reviewing critical care time: 20 minutes  Documentation critical care time: 10 minutes  Consulting other physicians critical care time: 20 minutes  Total critical care time (exclusive of procedural time) : 95 minutes        Labs Reviewed   CBC W/ AUTO DIFFERENTIAL - Abnormal; Notable for the following components:       Result Value    MPV 8.7 (*)     Gran # (ANC) 8.8 (*)     Lymph # 0.7 (*)     Gran % 88.9 (*)     Lymph % 7.3 (*)     Mono % 3.4 (*)     All other components within normal limits   COMPREHENSIVE METABOLIC PANEL - Abnormal; Notable for the following components:    Sodium 131 (*)     Glucose 125 (*)     Total Protein 8.6 (*)     All other components within normal limits   B-TYPE NATRIURETIC PEPTIDE - Abnormal; Notable for the following components:     (*)     All other components within normal limits   URINALYSIS, REFLEX TO URINE CULTURE - Abnormal; Notable for the following components:    Color, UA Orange (*)     Appearance, UA Cloudy (*)     Protein, UA 1+ (*)     Ketones, UA 1+ (*)     All other components within normal limits    Narrative:     Specimen Source->Urine   URINALYSIS MICROSCOPIC - Abnormal; Notable for the following components:    RBC, UA 8 (*)     Amorphous, UA Many (*)     All other components within normal limits    Narrative:     Specimen Source->Urine   CULTURE, URINE   CULTURE, URINE   MAGNESIUM   TROPONIN I HIGH SENSITIVITY   TROPONIN I HIGH SENSITIVITY   TYPE & SCREEN   PREPARE RBC SOFT        ECG Results              EKG 12-lead (In process)        Collection Time Result Time QRS Duration [facility] QTC Calculation    06/25/24 11:35:35 06/25/24 11:52:06 74 435                     In process by Interface, Lab In [facility]  (06/25/24 11:52:14)                   Narrative:    Test Reason : R07.9,    Vent. Rate : 072 BPM     Atrial Rate : 072 BPM     P-R Int : 168 ms          QRS Dur : 074 ms      QT Int : 398 ms       P-R-T Axes : 082 065 081 degrees     QTc Int : 435 ms    Normal sinus rhythm  Normal ECG  No previous ECGs available    Referred By: [email or web address]   SELF           Confirmed By:                                   Imaging Results              CTA Head and Neck (xpd) (Final result)  Result time 06/25/24 13:59:12      Final result by Evelio Rosales MD (06/25/24 13:59:12)                   Impression:      1.  Aneurysmal dilation of the visualized ascending thoracic aorta with partially thrombosed ascending aortic dissection (Constantin type A).  This could be further characterized with a dedicated CTA of the chest.    2.  Dissection extending into the right brachiocephalic, and proximal most right subclavian and right vertebral arteries.    3.  Continuation of the dissection into the right common carotid, carotid bulb and proximal most right internal/external carotid arteries.    4.  Motion artifact in the visualized chest, but suspected small saccular component to the ascending thoracic aorta measuring up to 8 mm.    5.  Patent intracranial circulation with no high-grade stenosis large vessel occlusion or aneurysm as above.  The    RESULT NOTIFICATION: These observations were discussed by the dictating physician, by phone with, and acknowledged by Sudhir Monge Jr. at 13:53 on 6/25/2024.      Electronically signed by: Evelio Rosales  Date:    06/25/2024  Time:    13:59               Narrative:    CLINICAL HISTORY:  ([id number])74 y/o  (1950) F    Vertigo, central;    TECHNIQUE:  (A#[id number], exam time 6/25/2024 13:35)    CTA HEAD AND NECK (XPD) [id number]    CT angiography of the head and neck was performed using thin axial slices respectively and reviewed in multiple planes. Two and three dimensional multiplanar  reformatted images were generated and submitted to PACS.    POST PROCESSING:    (Vitrea) 3D advanced post-processing was performed by the interpreting physician using an independent workstation utilizing a combination of MIP and MPR techniques to better evaluate anatomy and disease process. 3D rendering was performed with physician participation and supervision.    CONTRAST:    100  mL Omni 350 was injected intravenously.    CMS MANDATED QUALITY DATA - CT RADIATION - 436    All CT scans at this facility utilize dose modulation, iterative reconstruction, and/or weight based dosing when appropriate to reduce radiation dose to as low as reasonably achievable.    CMS MANDATED QUALITY DATA - CAROTID - 195    All measurements and percent stenosis described below were determined using NASCET criteria or criteria similar to NASCET, as defined by the Society of Radiologists in Ultrasound Consensus Conference, Radiology, 2003    COMPARISON:  None available.    FINDINGS:  CTA of the [place]: There is patency of the intracranial circulation including the bilateral internal carotid arteries, the carotid terminus, A1 and M1 segments, the bilateral intracranial vertebral arteries, the basilar artery and its distal branches.  The left A1 segment is asymmetrically smaller than the right with a patent anterior communicating artery appearing to make of the dominant arterial supply.  Tiny posterior communicating arteries appear to be present bilaterally.  No aneurysm is identified within the limits of the technique. Conventional angiography is more sensitive for the detection of small aneurysms.    CTA of the Neck:    The visualized portions of the thoracic inlet showing ascending aortic aneurysm measuring 4.7 x 3.9 cm, with an ascending aortic dissection, which appears to be partially thrombosed, and patent luminal diameter of 3.6 x 2.9 cm (axial image 54 series 6).  There is a small somewhat saccular outpouching seen along  the posterior ascending thoracic aorta measuring 8 x 5 mm (axial image 64), while this may be related to motion artifact, this is suspicious.  The dissection appears to extend into the right brachiocephalic, proximal most right subclavian, proximal most right vertebral, and into the right common carotid.    RIGHT:    Common carotid artery origin: Patent, with a dissection extending into the carotid bulb and proximal internal/external carotid arteries.    Cervical segment: There is a dissection extending along the length of the cervical segment    External carotid origin characteristics: There is a dissection present.  The vessel appears patent.    Carotid bifurcation: Patent, with the dissection extending to the proximal most internal carotid artery and external carotid artery.    Internal carotid origin characteristics: No focal stenosis.    Vertebral artery: There is a right dominant vertebrobasilar arterial system.  The vertebral artery beyond the dissection appears patent.    LEFT    Common carotid artery origin: Patent.    Cervical segment: Patent.    External carotid origin characteristics: Patent.    Carotid bifurcation: Patent.    Internal carotid origin characteristics: No focal stenosis.    Vertebral artery: The distal V4 segment is asymmetrically small with the right dominant vertebrobasilar arterial systems.                                       X-Ray Chest AP Portable (Final result)  Result time 06/25/24 12:19:44      Final result by Tomer Miguel MD (06/25/24 12:19:44)                   Impression:      Negative chest.      Electronically signed by: Tomer Miguel  Date:    06/25/2024  Time:    12:19               Narrative:    EXAMINATION:  XR CHEST AP PORTABLE    CLINICAL HISTORY:  Chest Pain;    FINDINGS:  Portable chest at 1207 without comparisons shows normal cardiomediastinal silhouette.    Lungs are clear. Pulmonary vasculature is normal. No acute osseous abnormality.                                        Medications   0.9%  NaCl infusion (for blood administration) (has no administration in time range)   melatonin tablet 6 mg (has no administration in time range)   ondansetron injection 4 mg (has no administration in time range)   acetaminophen tablet 650 mg (has no administration in time range)   aluminum-magnesium hydroxide-simethicone 200-200-20 mg/5 mL suspension 30 mL (has no administration in time range)   acetaminophen tablet 650 mg (has no administration in time range)   HYDROcodone-acetaminophen 5-325 mg per tablet 1 tablet (has no administration in time range)   naloxone 0.4 mg/mL injection 0.02 mg (has no administration in time range)   potassium bicarbonate disintegrating tablet 50 mEq (has no administration in time range)   potassium bicarbonate disintegrating tablet 35 mEq (has no administration in time range)   potassium bicarbonate disintegrating tablet 60 mEq (has no administration in time range)   magnesium oxide tablet 800 mg (has no administration in time range)   magnesium oxide tablet 800 mg (has no administration in time range)   potassium, sodium phosphates 280-160-250 mg packet 2 packet (has no administration in time range)   potassium, sodium phosphates 280-160-250 mg packet 2 packet (has no administration in time range)   potassium, sodium phosphates 280-160-250 mg packet 2 packet (has no administration in time range)   labetaloL injection 2.5 mg (2.5 mg Intravenous Not Given 6/25/24 1430)   cefTRIAXone (ROCEPHIN) 1 g in dextrose 5 % 100 mL IVPB (ready to mix) (has no administration in time range)   famotidine tablet 20 mg (has no administration in time range)   niCARdipine 40 mg/200 mL (0.2 mg/mL) infusion (has no administration in time range)   meclizine tablet 25 mg (has no administration in time range)   sodium chloride 0.9% bolus 1,000 mL 1,000 mL (0 mLs Intravenous Stopped 6/25/24 1302)   ondansetron injection 4 mg (4 mg Intravenous Given 6/25/24 1158)   meclizine tablet  25 mg (25 mg Oral Given 6/25/24 1200)   metoclopramide injection 10 mg (10 mg Intravenous Given 6/25/24 1306)   iohexoL (OMNIPAQUE 350) injection 100 mL (100 mLs Intravenous Given 6/25/24 1333)   labetaloL injection 10 mg (10 mg Intravenous Given 6/25/24 1408)     Medical Decision Making  73-year-old female presents with nausea and dizziness, onset within the last 24 hours, no associated neurological deficits, chest pain or headache.  Within initial differential diagnosis includes vertigo central versus peripheral vertigo.  Documentation of previous marijuana use however patient reports she does not believe it is due to that.  Patient also hypertensive and initially allow permissive hypertension given central vertigo workup.  Cardiac workup initiated and CTA head and neck ordered to evaluate for vascular abnormality.  Imaging did demonstrate a type A aortic dissection.  Blood pressure controlled with IV labetalol push and then started on infusion.  Spoke with CT surgeon Dr. Spangler will plan for emergent surgical intervention.  Spoke with hospitalist Dr. Nicole who will admit for further management and evaluation.  Patient and  updated and agree with plan    -after further discussions with Dr. Spangler we will transfer to [facility] for further CT surgery evaluation Dr. Sharma and team.  They feel this is more subacute to chronic.  Within differential diagnosis causing dizziness includes ischemic stroke.  Patient is added blood pressure control in the 130s, given some IV fluid hydration spoke with neurologist -no high-grade stenosis, no indication for emergent MRI at this time prior to transfer.    Amount and/or Complexity of Data Reviewed  Labs: ordered. Decision-making details documented in ED Course.  Radiology: ordered.  ECG/medicine tests: ordered and independent interpretation performed.     Details: Rate 72, QRS 74, , normal sinus rhythm  Discussion of management or test  interpretation with external provider(s): Spoke with Hospitalist Dr. Nicole, Will admit for further management and evaluation  Spoke with Cardiothoracic surgeon Dr. Spangler will plan for emergent surgery  Spoke with CT surgeon Dr. Sharma at [facility]  Dr. Bolton- neurology-no indication for emergent MRI prior to transfer    Risk  Prescription drug management.  Decision regarding hospitalization.               ED Course as of 06/25/24 1655 Tue Jun 25, 2024   1257 Magnesium : 2.1 [KB]   1257 WBC: 9.84 [KB]   1257 Hemoglobin: 13.7 [KB]   1257 Hematocrit: 39.9 [KB]   1257 Sodium(!): 131 [KB]   1257 Potassium: 3.5 [KB]   1257 Chloride: 96 [KB]   1257 CO2: 24 [KB]   1257 Glucose(!): 125 [KB]   1257 BILIRUBIN TOTAL: 0.5 [KB]   1257 ALP: 73 [KB]   1257 Troponin I High Sensitivity: 7.5 [KB]   1257 BNP(!): 108 [KB]   1257 Leukocyte Esterase, UA: Negative [KB]   1257 UROBILINOGEN UA: Negative [KB]   1257 NITRITE UA: Negative [KB]   1257 RBC, UA(!): 8 [KB]   1257 WBC, UA: 0 [KB]   1427 Spoke with hospitalist and CT surgeon [KB]      ED Course User Index  [KB] Sudhir Monge Jr., DO                             Clinical Impression:  Final diagnoses:  [R42] Vertigo  [R11.0] Nausea  [I71.010] Type 1 dissection of thoracic aorta  [I71.010] Dissection of ascending aorta (Primary)  [R03.0] Elevated blood pressure reading          ED Disposition Condition    Transfer to Another Facility Stable                Sudhir Monge Jr., DO  06/25/24 1454       Sudhir Monge Jr., DO  06/25/24 1616       Sudhir Monge Jr., DO  06/25/24 1655

## 2024-06-25 NOTE — HPI
73 year old pt getting admitted with Type A dissection of Aorta  Pt was smoking weed yesterday night and felt dizzy  Dizziness continued today with vertigo like symptoms  Also c/o dry heaves. Denied chest pain  Symptoms got worse and she came to ER  CTA Head/Neck showed Aneurysmal dilation of the ascending thoracic aorta with partially thrombosed ascending aortic dissection ([place] type A).    Dissection extending into the right brachiocephalic, and proximal most right subclavian and right vertebral arteries.   Also Continuation of the dissection into the right common carotid, carotid bulb and proximal most right internal/external carotid arteries.

## 2024-06-25 NOTE — Clinical Note
Diagnosis: Type 1 dissection of thoracic aorta [762349]   Future Attending Provider: MATTEO LUZ [[id number]]   Admit to which facility:: [facility] [[id number]]   Reason for IP Medical Treatment  (Clinical interventions that can only be accomplished in the IP setting? ) :: need rx   I certify that Inpatient services for greater than or equal to 2 midnights are medically necessary:: Yes   Plans for Post-Acute care--if anticipated (pick the single best option):: A. No post acute care anticipated at this time   Special Needs:: No Special Needs [1]

## 2024-06-25 NOTE — H&P
[facility] - Emergency Dept  Hospital Medicine  History & Physical    Patient Name: Tanika Olivera  MRN: [id number]  Patient Class: IP- Inpatient  Admission Date: 6/25/2024  Attending Physician: Jenaro Nicole MD   Primary Care Provider: Esdras Green MD         Patient information was obtained from patient and ER records.     Subjective:     Principal Problem:Type 1 dissection of thoracic aorta    Chief Complaint:   Chief Complaint   Patient presents with    Dizziness     Started last night after smoking weed, still dizzy today.  Also nauseated, but no vomiting.        HPI: 73 year old pt getting admitted with Type A dissection of Aorta  Pt was smoking weed yesterday night and felt dizzy  Dizziness continued today with vertigo like symptoms  Also c/o dry heaves. 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    Dissection extending into the right brachiocephalic, and proximal most right subclavian and right vertebral arteries.   Also Continuation of the dissection into the right common carotid, carotid bulb and proximal most right internal/external carotid arteries.     Past Medical History:   Diagnosis Date    History of hepatitis C, s/p successful Rx w/ SVR24 (cure) - 11/2018 2/23/2017    S/p epclusa w/ SVR    HTN (hypertension)     Hyperlipidemia     Osteoporosis        Past Surgical History:   Procedure Laterality Date    BREAST SURGERY  02/13/2007    Breast implants    COLONOSCOPY N/A 07/05/2022    Procedure: COLONOSCOPY;  Surgeon: Fran Palomares MD;  Location: [facility];  Service: Endoscopy;  Laterality: N/A;    EYE SURGERY  4/17/2019    Cataract surgery    TOTAL ABDOMINAL HYSTERECTOMY         Review of patient's allergies indicates:  No Known Allergies    No current facility-administered medications on file prior to encounter.      Current Outpatient Medications on File Prior to Encounter   Medication Sig    alendronate (FOSAMAX) 70 MG tablet Take 1 tablet (70 mg total) by mouth every 7 days. take on empty stomach with full glass of water/do not eat or lie down for 1 hour after. For osteopenia    calcium carbonate (OS-KYLAH) 500 mg calcium (1,250 mg) tablet Take 1 tablet by mouth once daily.    cholecalciferol, vitamin D3, (VITAMIN D3) 2,000 unit Tab Take 1 tablet by mouth once daily.    fish oil-omega-3 fatty acids 300-1,000 mg capsule Take 2 g by mouth once daily.    lisinopriL (PRINIVIL,ZESTRIL) 30 MG tablet Take 1 tablet (30 mg total) by mouth once daily.    lovastatin (MEVACOR) 20 MG tablet Take 1 tablet (20 mg total) by mouth every evening.    mirtazapine (REMERON) 15 MG tablet Take 1 tablet (15 mg total) by mouth every evening.    MULTIVITAMIN (MULTIPLE VITAMIN ORAL) Take 1 tablet by mouth once daily.    estradioL (ESTRACE) 0.01 % (0.1 mg/gram) vaginal cream Place 2 g vaginally every 7 days.     Family History       Problem Relation (Age of Onset)    Hyperlipidemia Mother    Hypertension Mother    Stroke Father          Tobacco Use    Smoking status: Never     Passive exposure: Never    Smokeless tobacco: Never   Substance and Sexual Activity    Alcohol use: No    Drug use: No    Sexual activity: Not Currently     Partners: Male     Birth control/protection: None     Review of Systems   Constitutional:  Positive for fatigue. Negative for activity change and appetite change.   HENT:  Negative for congestion and dental problem.    Eyes:  Negative for discharge and itching.   Respiratory:  Negative for shortness of breath.    Cardiovascular:  Negative for chest pain.   Gastrointestinal:  Negative for abdominal distention and abdominal pain.   Endocrine: Negative for cold intolerance.   Genitourinary:  Negative for difficulty urinating and dysuria.   Musculoskeletal:  Negative for arthralgias and back pain.   Skin:  Negative for color  change.   Neurological:  Positive for dizziness. Negative for facial asymmetry.   Hematological:  Negative for adenopathy.   Psychiatric/Behavioral:  Negative for agitation and behavioral problems.      Objective:     Vital Signs (Most Recent):  Temp: 98 °F (36.7 °C) (06/25/24 1047)  Pulse: 66 (06/25/24 1500)  Resp: 19 (06/25/24 1500)  BP: 136/74 (06/25/24 1500)  SpO2: 98 % (06/25/24 1500) Vital Signs (24h Range):  Temp:  [98 °F (36.7 °C)] 98 °F (36.7 °C)  Pulse:  [66-86] 66  Resp:  [15-24] 19  SpO2:  [97 %-100 %] 98 %  BP: (134-192)/(72-93) 136/74     Weight: 49.9 kg (110 lb)  Body mass index is 22.22 kg/m².     Physical Exam  Vitals and nursing note reviewed.   Constitutional:       General: She is not in acute distress.  HENT:      Head: Atraumatic.      Right Ear: External ear normal.      Left Ear: External ear normal.      Nose: Nose normal.      Mouth/Throat:      Mouth: Mucous membranes are moist.   Cardiovascular:      Rate and Rhythm: Normal rate.   Pulmonary:      Effort: Pulmonary effort is normal.   Abdominal:      Palpations: Abdomen is soft.   Musculoskeletal:         General: Normal range of motion.      Cervical back: Normal range of motion.   Skin:     General: Skin is warm.   Neurological:      Mental Status: She is alert and oriented to person, place, and time.   Psychiatric:         Behavior: Behavior normal.                Significant Labs: All pertinent labs within the past 24 hours have been reviewed.  CBC:   Recent Labs   Lab 06/25/24  1134   WBC 9.84   HGB 13.7   HCT 39.9        CMP:   Recent Labs   Lab 06/25/24  1134   *   K 3.5   CL 96   CO2 24   *   BUN 13   CREATININE 0.5   CALCIUM 9.4   PROT 8.6*   ALBUMIN 4.2   BILITOT 0.5   ALKPHOS 73   AST 20   ALT 11   ANIONGAP 11       Significant Imaging: I have reviewed all pertinent imaging results/findings within the past 24 hours.    Assessment/Plan:     * Type 1 dissection of thoracic aorta  Maintain labetalol drip   CTS  consulted  Admit in ICU  May need CTA chest very soon      Hypertension  Chronic, controlled. Latest blood pressure and vitals reviewed-     Temp:  [98 °F (36.7 °C)]   Pulse:  [66-86]   Resp:  [15-24]   BP: (134-192)/(72-93)   SpO2:  [97 %-100 %] .   Home meds for hypertension were reviewed and noted below.   Hypertension Medications               lisinopriL (PRINIVIL,ZESTRIL) 30 MG tablet Take 1 tablet (30 mg total) by mouth once daily.            While in the hospital, will manage blood pressure as follows; Continue home antihypertensive regimen    Will utilize p.r.n. blood pressure medication only if patient's blood pressure greater than 140/90 and she develops symptoms such as worsening chest pain or shortness of breath.      Maintain iv labetalol gtt      UTI  Maintain iv rocephin  Follow up UC             VTE Risk Mitigation (From admission, onward)           Ordered     IP VTE HIGH RISK PATIENT  Once         06/25/24 1412     Place sequential compression device  Until discontinued         06/25/24 1412                                    Jenaro Nicole MD  Department of Hospital Medicine  [facility] - Emergency Dept

## 2024-06-25 NOTE — ASSESSMENT & PLAN NOTE
Chronic, controlled. Latest blood pressure and vitals reviewed-     Temp:  [98 °F (36.7 °C)]   Pulse:  [66-86]   Resp:  [15-24]   BP: (134-192)/(72-93)   SpO2:  [97 %-100 %] .   Home meds for hypertension were reviewed and noted below.   Hypertension Medications               lisinopriL (PRINIVIL,ZESTRIL) 30 MG tablet Take 1 tablet (30 mg total) by mouth once daily.            While in the hospital, will manage blood pressure as follows; Continue home antihypertensive regimen    Will utilize p.r.n. blood pressure medication only if patient's blood pressure greater than 140/90 and she develops symptoms such as worsening chest pain or shortness of breath.      Maintain iv labetalol gtt

## 2024-06-25 NOTE — HOSPITAL COURSE
73 year old pt got admitted with Type A dissection of Aorta   CTA head/neck showed Aneurysmal dilation of the ascending thoracic aorta with partially thrombosed ascending aortic dissection (Carmen type A).    Dissection extending into the right brachiocephalic, and proximal most right subclavian and right vertebral arteries.   Also Continuation of the dissection into the right common carotid, carotid bulb and proximal most right internal/external carotid arteries.   Unable to do CTA Chest today because pt already received iv contrast  Pt was evaluated by CTS MD here and decision was made to transfer pt to [facility]

## 2024-06-26 ENCOUNTER — ANESTHESIA EVENT (OUTPATIENT)
Dept: SURGERY | Facility: HOSPITAL | Age: 74
End: 2024-06-26
Payer: MEDICARE

## 2024-06-26 DIAGNOSIS — I71.010: Primary | ICD-10-CM

## 2024-06-26 PROBLEM — I10 HYPERTENSION: Chronic | Status: ACTIVE | Noted: 2019-11-07

## 2024-06-26 PROBLEM — F41.9 ANXIETY DISORDER: Chronic | Status: ACTIVE | Noted: 2019-11-07

## 2024-06-26 PROBLEM — E78.5 HYPERLIPIDEMIA: Chronic | Status: ACTIVE | Noted: 2019-11-07

## 2024-06-26 LAB
ALBUMIN SERPL BCP-MCNC: 2.8 G/DL (ref 3.5–5.2)
ALP SERPL-CCNC: 62 U/L (ref 55–135)
ALT SERPL W/O P-5'-P-CCNC: 8 U/L (ref 10–44)
ANION GAP SERPL CALC-SCNC: 9 MMOL/L (ref 8–16)
ASCENDING AORTA: 4.1 CM
AST SERPL-CCNC: 18 U/L (ref 10–40)
AV INDEX (PROSTH): 0.83
AV MEAN GRADIENT: 5 MMHG
AV PEAK GRADIENT: 9 MMHG
AV VALVE AREA BY VELOCITY RATIO: 2.61 CM²
AV VALVE AREA: 2.52 CM²
AV VELOCITY RATIO: 0.86
BASOPHILS # BLD AUTO: 0.01 K/UL (ref 0–0.2)
BASOPHILS NFR BLD: 0.1 % (ref 0–1.9)
BILIRUB SERPL-MCNC: 0.3 MG/DL (ref 0.1–1)
BSA FOR ECHO PROCEDURE: 1.47 M2
BUN SERPL-MCNC: 16 MG/DL (ref 8–23)
CALCIUM SERPL-MCNC: 8.5 MG/DL (ref 8.7–10.5)
CHLORIDE SERPL-SCNC: 102 MMOL/L (ref 95–110)
CO2 SERPL-SCNC: 21 MMOL/L (ref 23–29)
CREAT SERPL-MCNC: 0.7 MG/DL (ref 0.5–1.4)
CV ECHO LV RWT: 0.42 CM
DIFFERENTIAL METHOD BLD: ABNORMAL
DOP CALC AO PEAK VEL: 1.53 M/S
DOP CALC AO VTI: 32.68 CM
DOP CALC LVOT AREA: 3 CM2
DOP CALC LVOT DIAMETER: 1.97 CM
DOP CALC LVOT PEAK VEL: 1.31 M/S
DOP CALC LVOT STROKE VOLUME: 82.19 CM3
DOP CALCLVOT PEAK VEL VTI: 26.98 CM
E WAVE DECELERATION TIME: 224.52 MSEC
E/A RATIO: 0.77
E/E' RATIO: 9.33 M/S
ECHO LV POSTERIOR WALL: 0.83 CM (ref 0.6–1.1)
EOSINOPHIL # BLD AUTO: 0 K/UL (ref 0–0.5)
EOSINOPHIL NFR BLD: 0.1 % (ref 0–8)
ERYTHROCYTE [DISTWIDTH] IN BLOOD BY AUTOMATED COUNT: 12.3 % (ref 11.5–14.5)
EST. GFR  (NO RACE VARIABLE): >60 ML/MIN/1.73 M^2
FRACTIONAL SHORTENING: 34 % (ref 28–44)
GLUCOSE SERPL-MCNC: 138 MG/DL (ref 70–110)
HCT VFR BLD AUTO: 32.6 % (ref 37–48.5)
HGB BLD-MCNC: 11.3 G/DL (ref 12–16)
IMM GRANULOCYTES # BLD AUTO: 0.06 K/UL (ref 0–0.04)
IMM GRANULOCYTES NFR BLD AUTO: 0.5 % (ref 0–0.5)
INTERVENTRICULAR SEPTUM: 0.69 CM (ref 0.6–1.1)
LA MAJOR: 5.44 CM
LA MINOR: 5.42 CM
LA WIDTH: 3.43 CM
LEFT ATRIUM SIZE: 2.46 CM
LEFT ATRIUM VOLUME INDEX MOD: 26.2 ML/M2
LEFT ATRIUM VOLUME INDEX: 26.7 ML/M2
LEFT ATRIUM VOLUME MOD: 38.3 CM3
LEFT ATRIUM VOLUME: 38.94 CM3
LEFT INTERNAL DIMENSION IN SYSTOLE: 2.64 CM (ref 2.1–4)
LEFT VENTRICLE DIASTOLIC VOLUME INDEX: 47.01 ML/M2
LEFT VENTRICLE DIASTOLIC VOLUME: 68.64 ML
LEFT VENTRICLE MASS INDEX: 59 G/M2
LEFT VENTRICLE SYSTOLIC VOLUME INDEX: 17.4 ML/M2
LEFT VENTRICLE SYSTOLIC VOLUME: 25.44 ML
LEFT VENTRICULAR INTERNAL DIMENSION IN DIASTOLE: 3.97 CM (ref 3.5–6)
LEFT VENTRICULAR MASS: 86.21 G
LV LATERAL E/E' RATIO: 10.5 M/S
LV SEPTAL E/E' RATIO: 8.4 M/S
LYMPHOCYTES # BLD AUTO: 0.9 K/UL (ref 1–4.8)
LYMPHOCYTES NFR BLD: 7.1 % (ref 18–48)
MAGNESIUM SERPL-MCNC: 2 MG/DL (ref 1.6–2.6)
MCH RBC QN AUTO: 30 PG (ref 27–31)
MCHC RBC AUTO-ENTMCNC: 34.7 G/DL (ref 32–36)
MCV RBC AUTO: 87 FL (ref 82–98)
MONOCYTES # BLD AUTO: 0.6 K/UL (ref 0.3–1)
MONOCYTES NFR BLD: 4.7 % (ref 4–15)
MV PEAK A VEL: 1.09 M/S
MV PEAK E VEL: 0.84 M/S
MV STENOSIS PRESSURE HALF TIME: 65.11 MS
MV VALVE AREA P 1/2 METHOD: 3.38 CM2
NEUTROPHILS # BLD AUTO: 10.9 K/UL (ref 1.8–7.7)
NEUTROPHILS NFR BLD: 87.5 % (ref 38–73)
NRBC BLD-RTO: 0 /100 WBC
PHOSPHATE SERPL-MCNC: 2.7 MG/DL (ref 2.7–4.5)
PISA TR MAX VEL: 2.36 M/S
PLATELET # BLD AUTO: 353 K/UL (ref 150–450)
PMV BLD AUTO: 8.8 FL (ref 9.2–12.9)
POCT GLUCOSE: 162 MG/DL (ref 70–110)
POTASSIUM SERPL-SCNC: 4.1 MMOL/L (ref 3.5–5.1)
PROT SERPL-MCNC: 6.9 G/DL (ref 6–8.4)
RA MAJOR: 5.07 CM
RA PRESSURE ESTIMATED: 3 MMHG
RA WIDTH: 3.77 CM
RBC # BLD AUTO: 3.77 M/UL (ref 4–5.4)
RIGHT VENTRICLE DIASTOLIC BASEL DIMENSION: 3.9 CM
RV TB RVSP: 5 MMHG
SINUS: 3.06 CM
SODIUM SERPL-SCNC: 132 MMOL/L (ref 136–145)
STJ: 2.36 CM
TDI LATERAL: 0.08 M/S
TDI SEPTAL: 0.1 M/S
TDI: 0.09 M/S
TR MAX PG: 22 MMHG
TRICUSPID ANNULAR PLANE SYSTOLIC EXCURSION: 2.51 CM
TV REST PULMONARY ARTERY PRESSURE: 25 MMHG
WBC # BLD AUTO: 12.46 K/UL (ref 3.9–12.7)
Z-SCORE OF LEFT VENTRICULAR DIMENSION IN END DIASTOLE: -1.01
Z-SCORE OF LEFT VENTRICULAR DIMENSION IN END SYSTOLE: -0.26

## 2024-06-26 PROCEDURE — 25000003 PHARM REV CODE 250

## 2024-06-26 PROCEDURE — 63600175 PHARM REV CODE 636 W HCPCS

## 2024-06-26 PROCEDURE — 25500020 PHARM REV CODE 255: Performed by: THORACIC SURGERY (CARDIOTHORACIC VASCULAR SURGERY)

## 2024-06-26 PROCEDURE — 99291 CRITICAL CARE FIRST HOUR: CPT | Mod: ,,, | Performed by: ANESTHESIOLOGY

## 2024-06-26 PROCEDURE — 83735 ASSAY OF MAGNESIUM: CPT

## 2024-06-26 PROCEDURE — 94761 N-INVAS EAR/PLS OXIMETRY MLT: CPT

## 2024-06-26 PROCEDURE — 85025 COMPLETE CBC W/AUTO DIFF WBC: CPT

## 2024-06-26 PROCEDURE — 51701 INSERT BLADDER CATHETER: CPT

## 2024-06-26 PROCEDURE — 99900035 HC TECH TIME PER 15 MIN (STAT)

## 2024-06-26 PROCEDURE — 20000000 HC ICU ROOM

## 2024-06-26 PROCEDURE — 84100 ASSAY OF PHOSPHORUS: CPT

## 2024-06-26 PROCEDURE — 80053 COMPREHEN METABOLIC PANEL: CPT

## 2024-06-26 PROCEDURE — 51798 US URINE CAPACITY MEASURE: CPT

## 2024-06-26 RX ORDER — DEXTROSE MONOHYDRATE AND SODIUM CHLORIDE 5; .9 G/100ML; G/100ML
INJECTION, SOLUTION INTRAVENOUS CONTINUOUS
Status: DISCONTINUED | OUTPATIENT
Start: 2024-06-26 | End: 2024-06-27

## 2024-06-26 RX ORDER — MUPIROCIN 20 MG/G
OINTMENT TOPICAL
OUTPATIENT
Start: 2024-06-26

## 2024-06-26 RX ORDER — OXYCODONE HYDROCHLORIDE 5 MG/1
5 TABLET ORAL EVERY 4 HOURS PRN
Status: DISCONTINUED | OUTPATIENT
Start: 2024-06-26 | End: 2024-06-27

## 2024-06-26 RX ORDER — MECLIZINE HCL 12.5 MG 12.5 MG/1
12.5 TABLET ORAL 3 TIMES DAILY PRN
Status: DISCONTINUED | OUTPATIENT
Start: 2024-06-26 | End: 2024-06-27

## 2024-06-26 RX ADMIN — NICARDIPINE HYDROCHLORIDE 5 MG/HR: 0.2 INJECTION, SOLUTION INTRAVENOUS at 08:06

## 2024-06-26 RX ADMIN — IOHEXOL 75 ML: 350 INJECTION, SOLUTION INTRAVENOUS at 11:06

## 2024-06-26 RX ADMIN — MECLIZINE 12.5 MG: 12.5 TABLET ORAL at 11:06

## 2024-06-26 RX ADMIN — NICARDIPINE HYDROCHLORIDE 5 MG/HR: 0.2 INJECTION, SOLUTION INTRAVENOUS at 12:06

## 2024-06-26 RX ADMIN — IOHEXOL 100 ML: 350 INJECTION, SOLUTION INTRAVENOUS at 11:06

## 2024-06-26 RX ADMIN — ONDANSETRON 8 MG: 8 TABLET, ORALLY DISINTEGRATING ORAL at 08:06

## 2024-06-26 RX ADMIN — DEXTROSE AND SODIUM CHLORIDE: 5; 900 INJECTION, SOLUTION INTRAVENOUS at 09:06

## 2024-06-26 RX ADMIN — ACETAMINOPHEN 650 MG: 325 TABLET ORAL at 05:06

## 2024-06-26 RX ADMIN — ONDANSETRON 8 MG: 8 TABLET, ORALLY DISINTEGRATING ORAL at 09:06

## 2024-06-26 RX ADMIN — ACETAMINOPHEN 650 MG: 325 TABLET ORAL at 08:06

## 2024-06-26 RX ADMIN — ESMOLOL HYDROCHLORIDE 50 MCG/KG/MIN: 20 INJECTION INTRAVENOUS at 05:06

## 2024-06-26 RX ADMIN — ESMOLOL HYDROCHLORIDE 125 MCG/KG/MIN: 20 INJECTION INTRAVENOUS at 11:06

## 2024-06-26 NOTE — SUBJECTIVE & OBJECTIVE
Interval History/Significant Events: Intermittent dizziness and nausea noted overnight. Given Zofran x1. Started on Cardene gtt @ 7.5 mg/hr now and Esmolol @ 75 mcg/kg/hr to maintain MAP and HR 60-70. TTE pending. On room air. UOP unmeasured on pad and 300cc on in/out cath. + 1L yesterday. Upper and lower extremity neuro exam normal. NSR. Possible repeat CTA today. LR @ 95 cc/hr.          Objective:     Vital Signs (Most Recent):  Temp: 98.1 °F (36.7 °C) (06/26/24 0330)  Pulse: 63 (06/26/24 0715)  Resp: 17 (06/26/24 0715)  BP: (!) 94/52 (06/26/24 0700)  SpO2: 96 % (06/26/24 0715) Vital Signs (24h Range):  Temp:  [97.5 °F (36.4 °C)-98.2 °F (36.8 °C)] 98.1 °F (36.7 °C)  Pulse:  [62-86] 63  Resp:  [11-41] 17  SpO2:  [95 %-100 %] 96 %  BP: ()/(52-93) 94/52  Arterial Line BP: ()/(46-83) 88/49     Weight: 52.3 kg (115 lb 4.8 oz)  Body mass index is 23.29 kg/m².      Intake/Output Summary (Last 24 hours) at 6/26/2024 0742  Last data filed at 6/26/2024 0700  Gross per 24 hour   Intake 1499.69 ml   Output 300 ml   Net 1199.69 ml          Physical Exam  HENT:      Head: Normocephalic and atraumatic.   Eyes:      Extraocular Movements: Extraocular movements intact.      Conjunctiva/sclera: Conjunctivae normal.   Cardiovascular:      Rate and Rhythm: Normal rate and regular rhythm.      Comments: Right radial A-line  Pulmonary:      Effort: Pulmonary effort is normal.   Skin:     General: Skin is warm and dry.   Neurological:      General: No focal deficit present.      Mental Status: She is alert and oriented to person, place, and time.      Comments: CN II-XII grossly intact  Motor and sensation intact in bilateral upper and lower extremities            Vents:       Lines/Drains/Airways       Arterial Line  Duration             Arterial Line 06/25/24 1500 Right Radial <1 day              Peripheral Intravenous Line  Duration                  Peripheral IV - Single Lumen 06/25/24 1130 20 G Right Antecubital <1 day          Peripheral IV - Single Lumen 06/25/24 1432 18 G Anterior;Left Forearm <1 day                    Significant Labs:    CBC/Anemia Profile:  Recent Labs   Lab 06/25/24  1134 06/26/24  0321   WBC 9.84 12.46   HGB 13.7 11.3*   HCT 39.9 32.6*    353   MCV 88 87   RDW 12.4 12.3        Chemistries:  Recent Labs   Lab 06/25/24  1134 06/26/24  0321   * 132*   K 3.5 4.1   CL 96 102   CO2 24 21*   BUN 13 16   CREATININE 0.5 0.7   CALCIUM 9.4 8.5*   ALBUMIN 4.2 2.8*   PROT 8.6* 6.9   BILITOT 0.5 0.3   ALKPHOS 73 62   ALT 11 8*   AST 20 18   MG 2.1 2.0   PHOS  --  2.7       All pertinent labs within the past 24 hours have been reviewed.    Significant Imaging:  I have reviewed all pertinent imaging results/findings within the past 24 hours.

## 2024-06-26 NOTE — ASSESSMENT & PLAN NOTE
Neuro/Psych:   - Sedation: None  - Pain:     - 650 mg Tylenol PRN, Oxycodone PRN   - Q1 neurovascular checks                 Cardiac:   - BP Goal: MAP 60-70, HR 60-70. Impulse control  - Pressors: None  - Rhythm: NSR  - Cardene  - TTE ordered  - Possible CTA tomorrow    Pulmonary:   - Goal SpO2 >92%      Renal:      Recent Labs   Lab 06/25/24  1134   BUN 13   CREATININE 0.5        - Strict I/Os    FEN / GI:     - Daily CMP, PRN K/Mag/Phos per protocol     - Replace electrolytes as needed    - Nutrition: Cardiac Diet    - Bowel Regimen: Will start when appropriate    - LR at 95 cc/hr     ID:   - Afebrile  Recent Labs   Lab 06/25/24  1134   WBC 9.84       Heme/Onc:   - Hgb 13.7 pre-operatively  Recent Labs   Lab 06/25/24  1134   HGB 13.7        - CBC, PTT/INR daily  - DVT ppx: Will start when appropriate    Endocrine:   - CTS Goal -140  - HgbA1c: Not recorded     PPx:   Feeding: Cardiac diet  Analgesia/Sedation: PRN pain meds  Thromboembolic Prevention: Will start when appropiate  HOB >30: Yes  Stress Ulcer: Not indicated  Glucose Control: 110-140     Lines/Drains/Airway:  Right radial arterial line  PIVs     Dispo/Code Status/Palliative:    - SICU   - Full Code

## 2024-06-26 NOTE — PLAN OF CARE
[facility] - Surgical Intensive Care  Initial Discharge Assessment       Primary Care Provider: Esdras Green MD    Admission Diagnosis: Aortic dissection [I71.00]    Admission Date: 6/25/2024  Expected Discharge Date:     Transition of Care Barriers: None    Payor: [employer] MGD [facility] / Plan: [employer] SECURE SNP / Product Type: Medicare Advantage /     Extended Emergency Contact Information  Primary Emergency Contact: Gianluca Olivera  Address: [address] [place] [address]  Home Phone: [phone]  Mobile Phone: [phone]  Relation: Spouse    Discharge Plan A: Home with family, Home  Discharge Plan B: Home with family, Home, Home Health      [address] [facility]  [address] [address]  Phone: [phone] Fax: [phone]      Initial Assessment (most recent)       Adult Discharge Assessment - 06/26/24 1523          Discharge Assessment    Assessment Type Discharge Planning Assessment     Confirmed/corrected address, phone number and insurance Yes     Confirmed Demographics Correct on Facesheet     Source of Information patient;family     If unable to respond/provide information was family/caregiver contacted? Yes     Contact Name/Number Spouse- Gianluca Rizo - [phone], [phone]     Communicated VERÓNICA with patient/caregiver Date not available/Unable to determine     Reason For Admission Type 1 dissection of thoracic aorta     People in Home spouse   Spouse- Gianluca Rizo - [phone], [phone]    Facility Arrived From: [facility]     Do you expect to return to your current living situation? Yes     Do you have help at home or someone to help you manage your care at home? Yes     Who are your caregiver(s) and their phone number(s)? Spouse- Gianluca Rizo - [phone], [phone]     Prior to hospitilization cognitive status: Alert/Oriented     Current  cognitive status: Alert/Oriented     Walking or Climbing Stairs Difficulty no     Dressing/Bathing Difficulty no     Do you have any problems with: --   Pt denies    Home Accessibility stairs to enter home     Number of Stairs, Main Entrance none     Surface of Stairs, Main Entrance hardwood     Stair Railings, Main Entrance none     Landing, Stairs, Main Entrance no railings     Stairs Comment, Main Entrance none     Home Layout Able to live on 1st floor     Equipment Currently Used at Home none     Readmission within 30 days? No     Patient currently being followed by outpatient case management? No     Do you currently have service(s) that help you manage your care at home? No     Do you take prescription medications? Yes     Do you have prescription coverage? Yes     [facility] SECURE SNP -     Do you have any problems affording any of your prescribed medications? No     Is the patient taking medications as prescribed? yes     Who is going to help you get home at discharge? Spouse- Gianluca Rizo - [phone], [phone]     How do you get to doctors appointments? family or friend will provide     Are you on dialysis? No     Do you take coumadin? No     Discharge Plan A Home with family;Home     Discharge Plan B Home with family;Home;Home Health     DME Needed Upon Discharge  other (see comments)   TBD    Discharge Plan discussed with: Spouse/sig other;Patient     Name(s) and Number(s) Spouse- Gianluca Rizo - [phone], [phone]     Transition of Care Barriers None        Physical Activity    On average, how many days per week do you engage in moderate to strenuous exercise (like a brisk walk)? 3 days     On average, how many minutes do you engage in exercise at this level? 20 min        Financial Resource Strain    How hard is it for you to pay for the very basics like food, housing, medical care, and heating? Not very hard        Housing Stability    In the  last 12 months, was there a time when you were not able to pay the mortgage or rent on time? No     At any time in the past 12 months, were you homeless or living in a shelter (including now)? No        Transportation Needs    Has the lack of transportation kept you from medical appointments, meetings, work or from getting things needed for daily living? No        Food Insecurity    Within the past 12 months, you worried that your food would run out before you got the money to buy more. Never true     Within the past 12 months, the food you bought just didn't last and you didn't have money to get more. Never true        Social Isolation    How often do you feel lonely or isolated from those around you?  Never        Alcohol Use    Q1: How often do you have a drink containing alcohol? Patient declined     Q2: How many drinks containing alcohol do you have on a typical day when you are drinking? Patient declined     Q3: How often do you have six or more drinks on one occasion? Patient declined        Utilities    In the past 12 months has the electric, gas, oil, or water company threatened to shut off services in your home? No        Health Literacy    How often do you need to have someone help you when you read instructions, pamphlets, or other written material from your doctor or pharmacy? Never        OTHER    Name(s) of People in Home Spouse- Gianluca Rizo - [phone], [phone]                        SW spoke with patient/spouse in [address] for Discharge Planning Assessment. Per spouse, Pt lives with spouse in a single family home on a slab foundation with zero steps to porch and point of entry.  Patient was independent with ADLS and DID NOT use DME or in-home assistive equipment. Pt is not on dialysis  or coumadin,  takes medications as prescribed / keeps refilled / has resources for all daily and prescriptive needs. Preferred pharmacy is [facility] - Wants any necessary medication sent to preferred pharmacy.    Will have help from  Spouse- Gianluca Rizo - [phone], [phone]  and other immediate family upon discharge - spouse to provide transportation home.  All questions addressed. Unit and SW direct numbers provided. Will continue to follow for course of hospitalization    6/25/2024  6:55 PM  Aortic dissection [I71.00]    PCP: Esdras Green MD    PHARMACY:   [facility] [address] [place] [address] [facility] [address] [facility] [address]  Phone: [phone] Fax: [phone]      Payor: [employer] MGD [facility]ARE [facility] / Plan: [employer] SECURE SNP / Product Type: Medicare Advantage /       Jessica Nugent LMSW  - [facility]

## 2024-06-26 NOTE — CARE UPDATE
"FLIGHT CARE TRANSPORT NOTE     Date of Transport: 2024    MRN: [id number]  CSN: [id number]  : 1950    Age: 73 y.o.  Sex: female  Medication Dosing Weight: 52.3kg    Code Status: Full    Time Of Patient Handoff: 1830    ASSESSMENT/INTERVENTIONS     Note/Assessment:  This patient was transported by [facility] from the ED of [facility] by [facility]. The patient's overall condition remained unchanged throughout transport, with all vital signs remaining stable per the patient's current baseline. All lines, tubes, and devices remained patent and intact. The patient was transferred from the [facility] stretcher to SICU bed [id number] where care was transitioned to Bedside [facility]  without incident.    Vital Signs at Handoff:  106/52  64  16  100%    Oxygen Requirements/Vent Settings at Handoff:  None    Infusions at Handoff:  Cardene - Dose/Rate: 7.5mg/hr      FOLLOW-UP     Was the patient's family contacted?: Yes  If "yes", with whom did you speak?:  Gianluca ;     Was there a physical chart or media transferred with the patient?: Yes  If "yes", with whom was it left?:  Attending MD    Were any patient belongings transferred with the patient? No  If "yes", with whom were they left?:  N/A      Call [facility], Ginger Morris RN at [phone] (adult team) or [phone] (pediatric team) for additional questions or concerns.             "

## 2024-06-26 NOTE — PLAN OF CARE
Patient was seen and extensive discussion was carried out with the patient as well as the spouse who was present at the bedside.  Patient had a good day.  Patient still feels dizzy and states it is no different from 2 days ago when this started.  Patient states that there is needed in improvement not a worsening of symptoms.  Blood pressure remains under control.  I discussed the findings of the CT scan with the patient and and the spouse.  All questions and concerns as well as the treatment plan including surgery as well as deep hypothermic circulatory arrest was discussed.  We discussed about complications not limited to stroke bleeding, renal failure, infection and possible death.  All questions and concerns were addressed to their satisfaction.  We will prepare the patient for surgery per 7:00 a.m. tomorrow morning.

## 2024-06-26 NOTE — PROGRESS NOTES
[facility]  Critical Care - Surgery  Progress Note    Patient Name: Tanika Olivera  MRN: [id number]  Admission Date: 6/25/2024  Hospital Length of Stay: 1 days  Code Status: Full Code  Attending Provider: Jean Paul Sharma MD  Primary Care Provider: Esdras Green MD   Principal Problem: <principal problem not specified>    Subjective:     Hospital/ICU Course:  No notes on file    Interval History/Significant Events: Intermittent dizziness and nausea noted overnight. Given Zofran x1. Started on Cardene gtt @ 7.5 mg/hr now and Esmolol @ 75 mcg/kg/hr to maintain MAP and HR 60-70. TTE pending. On room air. UOP unmeasured on pad and 300cc on in/out cath. + 1L yesterday. Upper and lower extremity neuro exam normal. NSR. Possible repeat CTA today. LR @ 95 cc/hr.          Objective:     Vital Signs (Most Recent):  Temp: 98.1 °F (36.7 °C) (06/26/24 0330)  Pulse: 63 (06/26/24 0715)  Resp: 17 (06/26/24 0715)  BP: (!) 94/52 (06/26/24 0700)  SpO2: 96 % (06/26/24 0715) Vital Signs (24h Range):  Temp:  [97.5 °F (36.4 °C)-98.2 °F (36.8 °C)] 98.1 °F (36.7 °C)  Pulse:  [62-86] 63  Resp:  [11-41] 17  SpO2:  [95 %-100 %] 96 %  BP: ()/(52-93) 94/52  Arterial Line BP: ()/(46-83) 88/49     Weight: 52.3 kg (115 lb 4.8 oz)  Body mass index is 23.29 kg/m².      Intake/Output Summary (Last 24 hours) at 6/26/2024 0742  Last data filed at 6/26/2024 0700  Gross per 24 hour   Intake 1499.69 ml   Output 300 ml   Net 1199.69 ml          Physical Exam  HENT:      Head: Normocephalic and atraumatic.   Eyes:      Extraocular Movements: Extraocular movements intact.      Conjunctiva/sclera: Conjunctivae normal.   Cardiovascular:      Rate and Rhythm: Normal rate and regular rhythm.      Comments: Right radial A-line  Pulmonary:      Effort: Pulmonary effort is normal.   Skin:     General: Skin is warm and dry.   Neurological:      General: No focal deficit present.      Mental Status: She is alert and oriented to  person, place, and time.      Comments: CN II-XII grossly intact  Motor and sensation intact in bilateral upper and lower extremities            Vents:       Lines/Drains/Airways       Arterial Line  Duration             Arterial Line 06/25/24 1500 Right Radial <1 day              Peripheral Intravenous Line  Duration                  Peripheral IV - Single Lumen 06/25/24 1130 20 G Right Antecubital <1 day         Peripheral IV - Single Lumen 06/25/24 1432 18 G Anterior;Left Forearm <1 day                    Significant Labs:    CBC/Anemia Profile:  Recent Labs   Lab 06/25/24  1134 06/26/24  0321   WBC 9.84 12.46   HGB 13.7 11.3*   HCT 39.9 32.6*    353   MCV 88 87   RDW 12.4 12.3        Chemistries:  Recent Labs   Lab 06/25/24  1134 06/26/24  0321   * 132*   K 3.5 4.1   CL 96 102   CO2 24 21*   BUN 13 16   CREATININE 0.5 0.7   CALCIUM 9.4 8.5*   ALBUMIN 4.2 2.8*   PROT 8.6* 6.9   BILITOT 0.5 0.3   ALKPHOS 73 62   ALT 11 8*   AST 20 18   MG 2.1 2.0   PHOS  --  2.7       All pertinent labs within the past 24 hours have been reviewed.    Significant Imaging:  I have reviewed all pertinent imaging results/findings within the past 24 hours.  Assessment/Plan:     Cardiac/Vascular  Type 1 dissection of thoracic aorta  Neuro/Psych:   - Sedation: None  - Pain:     - 650 mg Tylenol PRN, Oxycodone PRN   - Q1 neurovascular checks                 Cardiac:   - BP Goal: MAP 60-70, HR 60-70. Impulse control  - Pressors: None  - Rhythm: NSR  - Cardene gtt and Esmolol gtt   - TTE pending  - Possible CTA today    Pulmonary:   - Goal SpO2 >92%  - On room air.       Renal:      Recent Labs   Lab 06/25/24  1134 06/26/24  0321   BUN 13 16   CREATININE 0.5 0.7          - Voiding spontaneous; monitor I/Os    - Trend BUN/Cr    FEN / GI:     - Daily CMP, PRN K/Mag/Phos per protocol     - Replace electrolytes as needed    - Nutrition: Cardiac Diet    - Bowel Regimen: Will start when appropriate    - LR at 95 cc/hr     ID:   -  Afebrile  Recent Labs   Lab 06/25/24  1134 06/26/24  0321   WBC 9.84 12.46         Heme/Onc:   - Hgb 13.7 pre-operatively  Recent Labs   Lab 06/25/24  1134 06/26/24  0321   HGB 13.7 11.3*    353       - CBC, PTT/INR daily  - DVT ppx: Will start when appropriate    Endocrine:   - CTS Goal -140  - HgbA1c: Not recorded  - SSI     PPx:   Feeding: Cardiac diet  Analgesia/Sedation: PRN pain meds  Thromboembolic Prevention: Will start when appropiate  HOB >30: Yes  Stress Ulcer: Not indicated  Glucose Control: 110-140     Lines/Drains/Airway:  Right radial arterial line  PIVs     Dispo/Code Status/Palliative:    - SICU   - Full Code          Critical care was time spent personally by me on the following activities: development of treatment plan with patient or surrogate and bedside caregivers, discussions with consultants, evaluation of patient's response to treatment, examination of patient, ordering and performing treatments and interventions, ordering and review of laboratory studies, ordering and review of radiographic studies, pulse oximetry, re-evaluation of patient's condition.  This critical care time did not overlap with that of any other provider or involve time for any procedures.     Florian Horta,   Critical Care - Surgery  [facility]

## 2024-06-26 NOTE — PLAN OF CARE
SW Attempted to see patient for assessment and discuss discharge plan ; however patient off the floor on attempts. SW will follow-up.          Jessica Nugent LMSW  - [facility]

## 2024-06-26 NOTE — PLAN OF CARE
SICU PLAN OF CARE    Dx: <principal problem not specified>    Goals of Care: MAP 60-70, HR 60-70, Q1H Neuro checks    Vital Signs (last 12 hours):   Temp:  [97.8 °F (36.6 °C)-98.2 °F (36.8 °C)]   Pulse:  [62-86]   Resp:  [11-41]   BP: ()/(52-93)   SpO2:  [95 %-100 %]   Arterial Line BP: ()/(47-83)      Neuro: AAOx4, Follows commands , and Moves all extremities spontaneously     Cardiac: NSR    Respiratory: Room Air    Gtts: Cardene, Esmolol, and LR    Urine Output: External Catheter - 1 unmeasured UOP. Bladder scan showed ~350mL - in and out cath performed, drained ~300mL clear yellow urine    Diet: Cardiac      Labs/Accuchecks: daily labs    Skin:  No redness or breakdown noted. Protective foams intact to sacrum and bilateral heels. Patient turns independently, bony prominences protected, and waffle mattress inflated and working correctly.     Shift Events:  Admit to SICU, see previous note. Q1H neuro exams performed, neuro remains intact. Patient c/o intermittent nausea and constant dizziness unchanged from admit. PRN nausea medication given. Bladder scan and in/out cath as stated above. See flowsheet for further assessment/details. Family updated on current condition/plan of care, questions answered, and emotional support provided.  MD updated on current condition, vitals, labs, and gtts.

## 2024-06-26 NOTE — NURSING
Admit to SICU [id number], MD at bedside to see patient and establish goals of care. Patient connected to bedside monitoring, labs sent. Will monitor patient and update team of any changes.        Nurses Note -- 4 Eyes    Skin assessed during: Admit    [x] No Altered Skin Integrity Present    [x]Prevention Measures Documented      [] Yes- Altered Skin Integrity Present or Discovered   [] LDA Added if Not in Epic (Describe Wound)   [] New Altered Skin Integrity was Present on Admit and Documented in LDA   [] Wound Image Taken    Wound Care Consulted? No    Attending Nurse:  Nilton Thomas RN/Staff Member:   [email or web address]

## 2024-06-26 NOTE — ANESTHESIA PREPROCEDURE EVALUATION
[facility]  Anesthesia Pre-Operative Evaluation     Patient Name: Tanika Olivera  YOB: 1950  MRN: [id number]  [facility]: [id number]       Admit Date: 6/25/2024   Admit Team: Networked reference to record PCT   Hospital Day: 2  Date of Procedure: 6/27/2024  Anesthesia: General Procedure: Procedure(s) (LRB):  ASCENDING AORTA REPAIR (N/A)  Pre-Operative Diagnosis: Type 1 dissection of thoracic aorta [I71.010]  Proceduralist:Surgeons and Role:     * Jean Paul Sharma MD - Primary  Code Status: Full Code   Advanced Directive: <no information>  Isolation Precautions: No active isolations  Capacity: Full capacity       SUBJECTIVE:     Pre-operative evaluation for Procedure(s) (LRB):  ASCENDING AORTA REPAIR (N/A)  06/26/2024  Hospital LOS: 1 days  ICU LOS: 20h    Tanika Olivera is a 73 y.o. female w/ a significant PMHx of HCV s/p treatment, HTN, HLD, who was presented as a transfer for type A aortic dissection. Initial presenting symptom was dizziness. No chest pain or shortness of breath. Currently on cardene and esmolol gtt. Current access: R radial A line, 20 G PIV, and 18 G PIV. Imaging shows partially thrombosed ascending aortic dissection that extends into R brachiocephalic and proximal most R subclavian/R vertebral arteries. Also has continuation of dissection into R common carotid.         Patient now presents for the above procedure(s).    TTE:     Left Ventricle: The left ventricle is normal in size. Normal wall   thickness. Normal wall motion. There is normal systolic function with a   visually estimated ejection fraction of 65 - 70%. There is normal   diastolic function.    Right Ventricle: Normal right ventricular cavity size. Wall thickness   is normal. Systolic function is normal.    Aorta: Ascending aorta is mildly dilated measuring 4.1 cm. There is a   thickened region along the anterior wall of the ascending aorta, likely   representing the thrombosed false lumen in known dissection  "of the   ascending aorta.    Pulmonary Artery: The estimated pulmonary artery systolic pressure is   25 mmHg.    IVC/SVC: Normal venous pressure at 3 mmHg.       No results found for this or any previous visit.  NIK:  No results found for this or any previous visit.  Stress Test:  No results found for this or any previous visit.     Cardiac Catheterization:  No results found for this or any previous visit.     PFT:  No results found for: "FEV1", "FVC", "TTF2CVX", "TLC", "DLCO"     NPO Status:     LDA: None documented.       Peripheral IV - Single Lumen 06/25/24 1130 20 G Right Antecubital (Active)   Site Assessment Clean;Intact;No redness;Dry;No swelling 06/26/24 1101   Extremity Assessment Distal to IV No warmth;No swelling;No redness;No abnormal discoloration 06/26/24 1101   Line Status Infusing 06/26/24 1101   Dressing Status Clean;Dry;Intact 06/26/24 1101   Dressing Intervention Integrity maintained 06/26/24 1101   Dressing Change Due 06/29/24 06/26/24 1101   Site Change Due 06/29/24 06/26/24 1101   Reason Not Rotated Not due 06/26/24 1101   Number of days: 1            Peripheral IV - Single Lumen 06/25/24 1432 18 G Anterior;Left Forearm (Active)   Site Assessment Clean;Dry;Intact;No redness;No swelling 06/26/24 1101   Extremity Assessment Distal to IV No warmth;No swelling;No redness;No abnormal discoloration 06/26/24 1101   Line Status Infusing 06/26/24 1101   Dressing Status Clean;Dry;Intact 06/26/24 1101   Dressing Intervention Integrity maintained 06/26/24 1101   Dressing Change Due 06/29/24 06/26/24 1101   Site Change Due 06/29/24 06/26/24 1101   Reason Not Rotated Not due 06/26/24 1101   Number of days: 1       Arterial Line 06/25/24 1500 Right Radial (Active)   Site Assessment Dry;Clean;Intact;No redness;No swelling 06/26/24 1101   Line Status Pulsatile blood flow 06/26/24 1101   Art Line Waveform Appropriate;Square wave test performed 06/26/24 1101   Arterial Line Interventions Zeroed and " calibrated;Leveled;Connections checked and tightened;Flushed per protocol 06/26/24 1101   Color/Movement/Sensation Capillary refill less than 3 sec 06/26/24 1101   Dressing Type Central line dressing 06/26/24 1101   Dressing Status Clean;Dry;Intact 06/26/24 1101   Dressing Intervention Integrity maintained 06/26/24 1101   Dressing Change Due 06/29/24 06/26/24 1101   Tubing Change Due 06/29/24 06/26/24 1101   Number of days: 1            Urethral Catheter 06/26/24 1221 16 Fr. (Active)   Output (mL) 290 mL 06/26/24 1300   Number of days: 0       Vent/Oxygen: None documented           Drips: None documented.   D5 and 0.9% NaCl   Intravenous Continuous 40 mL/hr at 06/26/24 1300 Rate Verify at 06/26/24 1300    esmolol  0-300 mcg/kg/min Intravenous Continuous 11.8 mL/hr at 06/26/24 1300 75 mcg/kg/min at 06/26/24 1300    nicardipine  0-15 mg/hr Intravenous Continuous 25 mL/hr at 06/26/24 1300 5 mg/hr at 06/26/24 1300       Previous Airway: None documented    Allergies:  Review of patient's allergies indicates:  No Known Allergies    Medications:     Current Outpatient Medications   Medication Instructions    alendronate (FOSAMAX) 70 mg, Oral, Every 7 days, take on empty stomach with full glass of water/do not eat or lie down for 1 hour after. For osteopenia    calcium carbonate (OS-KYLAH) 500 mg calcium (1,250 mg) tablet 1 tablet, Oral, Daily    cholecalciferol, vitamin D3, (VITAMIN D3) 2,000 unit Tab 1 tablet, Oral, Daily    estradioL (ESTRACE) 2 g, Vaginal, Every 7 days    fish oil-omega-3 fatty acids 300-1,000 mg capsule 2 g, Oral, Daily    lisinopriL (PRINIVIL,ZESTRIL) 30 mg, Oral, Daily    lovastatin (MEVACOR) 20 mg, Oral, Nightly    mirtazapine (REMERON) 15 mg, Oral, Nightly    MULTIVITAMIN (MULTIPLE VITAMIN ORAL) 1 tablet, Oral, Daily     Inpatient Medications:    Antibiotics (From admission, onward)      None          VTE Risk Mitigation (From admission, onward)           Ordered     IP VTE HIGH RISK PATIENT  Once          06/25/24 1932     Place sequential compression device  Until discontinued         06/25/24 1932                    History:     Active Hospital Problems    Diagnosis  POA    *Type 1 dissection of thoracic aorta [I71.010]  Yes    Hypertension [I10]  Yes     Chronic    Hyperlipidemia [E78.5]  Yes     Chronic    Anxiety disorder [F41.9]  Yes     Chronic      Resolved Hospital Problems   No resolved problems to display.     Medical History:  Past Medical History:   Diagnosis Date    History of hepatitis C, s/p successful Rx w/ SVR24 (cure) - 11/2018 2/23/2017    S/p epclusa w/ SVR    HTN (hypertension)     Hyperlipidemia     Osteoporosis      Surgical History:    has a past surgical history that includes Total abdominal hysterectomy; Colonoscopy (N/A, 07/05/2022); Eye surgery (4/17/2019); and Breast surgery (02/13/2007).   Social History:    reports that she is not currently sexually active and has had partner(s) who are male. She reports using the following method of birth control/protection: None.  reports that she has never smoked. She has never been exposed to tobacco smoke. She has never used smokeless tobacco. She reports that she does not drink alcohol and does not use drugs.    OBJECTIVE:   Vital Signs Range (Last 24H):  Temp:  [36.4 °C (97.5 °F)-36.8 °C (98.2 °F)]   Pulse:  [62-75]   Resp:  [6-41]   BP: ()/(50-88)   SpO2:  [93 %-100 %]   Arterial Line BP: ()/(46-83)   Lab Results   Component Value Date    WBC 12.46 06/26/2024    HGB 11.3 (L) 06/26/2024    HCT 32.6 (L) 06/26/2024     06/26/2024     (L) 06/26/2024    K 4.1 06/26/2024     06/26/2024    CREATININE 0.7 06/26/2024    BUN 16 06/26/2024    CO2 21 (L) 06/26/2024     (H) 06/26/2024    CALCIUM 8.5 (L) 06/26/2024    MG 2.0 06/26/2024    PHOS 2.7 06/26/2024    ALKPHOS 62 06/26/2024    ALT 8 (L) 06/26/2024    AST 18 06/26/2024    ALBUMIN 2.8 (L) 06/26/2024    INR 0.9 02/06/2018    MICROALBUR 0.2 02/14/2024    BNP  "108 (H) 06/25/2024     Recent Labs     06/25/24  1134 06/26/24  0321   WBC 9.84 12.46   HGB 13.7 11.3*   HCT 39.9 32.6*    353   * 132*   K 3.5 4.1   CREATININE 0.5 0.7   * 138*     No results for input(s): "PH", "PCO2", "PO2", "HCO3", "POCSATURATED", "BE" in the last 72 hours.   No LMP recorded. Patient is postmenopausal.    Please see Results Review for additional labs & imaging.     EKG:   Results for orders placed or performed during the hospital encounter of 06/25/24   EKG 12-lead    Collection Time: 06/25/24 11:35 AM   Result Value Ref Range    QRS Duration 74 ms    [facility] QTC Calculation 435 ms    Narrative    Test Reason : R07.9,    Vent. Rate : 072 BPM     Atrial Rate : 072 BPM     P-R Int : 168 ms          QRS Dur : 074 ms      QT Int : 398 ms       P-R-T Axes : 082 065 081 degrees     QTc Int : 435 ms    Normal sinus rhythm  Normal ECG  No previous ECGs available    Referred By: [email or web address]   SELF           Confirmed By:        ECHO:  See subjective, if available.    ASSESSMENT/PLAN:         Pre-op Assessment    I have reviewed the Patient Summary Reports.     I have reviewed the Nursing Notes. I have reviewed the NPO Status.   I have reviewed the Medications.     Review of Systems  Anesthesia Hx:             Denies Family Hx of Anesthesia complications.    Denies Personal Hx of Anesthesia complications.                    Cardiovascular:     Hypertension                                        Hepatic/GI:      Liver Disease, Hepatitis           Psych:  Psychiatric History                  Physical Exam  General: Well nourished, Cooperative, Alert and Oriented    Airway:  Mallampati: III / III  Mouth Opening: Small, but > 3cm  TM Distance: Normal  Tongue: Normal  Neck ROM: Normal ROM    Dental:  Intact, Periodontal disease, Prominent Incisors    Chest/Lungs:  Normal Respiratory Rate    Heart:  Rate: Normal        Anesthesia Plan  Type of Anesthesia, risks & benefits discussed:    Anesthesia " Type: Gen ETT  Intra-op Monitoring Plan: Standard ASA Monitors, Art Line, Central Line and NIK  Post Op Pain Control Plan: multimodal analgesia  Induction:  IV  Airway Plan: Video  Informed Consent: Informed consent signed with the Patient and all parties understand the risks and agree with anesthesia plan.  All questions answered. Patient consented to blood products? Yes  ASA Score: 4  Day of Surgery Review of History & Physical: H&P Update referred to the surgeon/provider.  Anesthesia Plan Notes: Discussed case and plan in detail with Dr. Sharma.  Requesting no autologous blood.  Requesting bilat a-lines.  Requesting higher perfusion pressures (MAP 70s).  Patient very high risk with ongoing symptoms (primarily dizziness) and dissection involving carotid.  Plan circulatory arrest, discussed likely extensive blood product usage with patient and family.  Will investigate IJ vessels and consider 2 central lines to ensure adequate IV access.    Ready For Surgery From Anesthesia Perspective.     .

## 2024-06-26 NOTE — H&P
[facility] - Surgical Intensive Care  Critical Care - Surgery  History & Physical    Patient Name: Tanika Olivera  MRN: [id number]  Admission Date: 6/25/2024  Code Status: Full Code  Attending Physician: Jean Paul Sharma MD   Primary Care Provider: Esdras Green MD   Principal Problem: <principal problem not specified>    Subjective:     HPI:  73 y.o. female with PMH of HTN, HLD, anxiety disorder, history of hepatitis C s/p successful treatment in 2018, osteopenia who presents as a transfer from [facility] for evaluation by cardiothoracic surgery for a Type A Aortic dissection. Patient states that she began feeling dizzy and nauseous at 9 pm last night that persisted which prompted her to present to [facility]. She denies any changes in vision or headaches. Workup at [facility] with a CT showed  Aneurysmal dilation of the visualized ascending thoracic aorta with partially thrombosed ascending aortic dissection ([place] type A). Dissection extending into the right brachiocephalic, and proximal most right subclavian and right vertebral arteries. Continuation of the dissection into the right common carotid, carotid bulb and proximal most right internal/external carotid arteries. Labs were unremarkable.    Hospital/ICU Course:  No notes on file         Past Medical History:   Diagnosis Date    History of hepatitis C, s/p successful Rx w/ SVR24 (cure) - 11/2018 2/23/2017    S/p epclusa w/ SVR    HTN (hypertension)     Hyperlipidemia     Osteoporosis        Past Surgical History:   Procedure Laterality Date    BREAST SURGERY  02/13/2007    Breast implants    COLONOSCOPY N/A 07/05/2022    Procedure: COLONOSCOPY;  Surgeon: Fran Palomares MD;  Location: [facility];  Service: Endoscopy;  Laterality: N/A;    EYE SURGERY  4/17/2019    Cataract surgery    TOTAL ABDOMINAL HYSTERECTOMY         Review of patient's allergies indicates:  No Known Allergies    Family History       Problem Relation (Age of  Onset)    Hyperlipidemia Mother    Hypertension Mother    Stroke Father          Tobacco Use    Smoking status: Never     Passive exposure: Never    Smokeless tobacco: Never   Substance and Sexual Activity    Alcohol use: No    Drug use: No    Sexual activity: Not Currently     Partners: Male     Birth control/protection: None      Review of Systems   Constitutional:  Negative for chills and fever.   Eyes:  Negative for visual disturbance.   Respiratory:  Negative for shortness of breath.    Cardiovascular:  Negative for chest pain.   Gastrointestinal:  Positive for nausea. Negative for abdominal pain and vomiting.   Genitourinary:  Negative for difficulty urinating.   Neurological:  Positive for dizziness. Negative for headaches.     Objective:     Vital Signs (Most Recent):  Temp: 97.8 °F (36.6 °C) (06/25/24 1900)  Pulse: 65 (06/25/24 1900)  Resp: 16 (06/25/24 1900)  BP: (!) 112/57 (06/25/24 1900)  SpO2: 97 % (06/25/24 1900) Vital Signs (24h Range):  Temp:  [97.8 °F (36.6 °C)-98.2 °F (36.8 °C)] 97.8 °F (36.6 °C)  Pulse:  [65-86] 65  Resp:  [15-24] 16  SpO2:  [97 %-100 %] 97 %  BP: ()/(53-93) 112/57  Arterial Line BP: ()/(48-83) 112/60     Weight: 52.3 kg (115 lb 4.8 oz)  Body mass index is 23.29 kg/m².    No intake or output data in the 24 hours ending 06/25/24 1921       Physical Exam  HENT:      Head: Normocephalic and atraumatic.   Eyes:      Extraocular Movements: Extraocular movements intact.      Conjunctiva/sclera: Conjunctivae normal.   Cardiovascular:      Rate and Rhythm: Normal rate and regular rhythm.      Comments: Right radial A-line  Pulmonary:      Effort: Pulmonary effort is normal.   Skin:     General: Skin is warm and dry.   Neurological:      General: No focal deficit present.      Mental Status: She is alert and oriented to person, place, and time.      Comments: CN II-XII grossly intact  Motor and sensation intact in bilateral upper and lower extremities                  Lines/Drains/Airways       Arterial Line  Duration             Arterial Line 06/25/24 1500 Right Radial <1 day              Peripheral Intravenous Line  Duration                  Peripheral IV - Single Lumen 06/25/24 1130 20 G Right Antecubital <1 day         Peripheral IV - Single Lumen 06/25/24 1432 18 G Anterior;Left Forearm <1 day                    Significant Labs:    CBC/Anemia Profile:  Recent Labs   Lab 06/25/24  1134   WBC 9.84   HGB 13.7   HCT 39.9      MCV 88   RDW 12.4        Chemistries:  Recent Labs   Lab 06/25/24  1134   *   K 3.5   CL 96   CO2 24   BUN 13   CREATININE 0.5   CALCIUM 9.4   ALBUMIN 4.2   PROT 8.6*   BILITOT 0.5   ALKPHOS 73   ALT 11   AST 20   MG 2.1       All pertinent labs within the past 24 hours have been reviewed.    Significant Imaging: I have reviewed all pertinent imaging results/findings within the past 24 hours.  Assessment/Plan:     Cardiac/Vascular  Type 1 dissection of thoracic aorta  Neuro/Psych:   - Sedation: None  - Pain:     - 650 mg Tylenol PRN, Oxycodone PRN   - Q1 neurovascular checks                 Cardiac:   - BP Goal: MAP 60-70, HR 60-70. Impulse control  - Pressors: None  - Rhythm: NSR  - Cardene  - TTE ordered  - Possible CTA tomorrow    Pulmonary:   - Goal SpO2 >92%      Renal:      Recent Labs   Lab 06/25/24  1134   BUN 13   CREATININE 0.5        - Strict I/Os    FEN / GI:     - Daily CMP, PRN K/Mag/Phos per protocol     - Replace electrolytes as needed    - Nutrition: Cardiac Diet    - Bowel Regimen: Will start when appropriate    - LR at 95 cc/hr     ID:   - Afebrile  Recent Labs   Lab 06/25/24  1134   WBC 9.84       Heme/Onc:   - Hgb 13.7 pre-operatively  Recent Labs   Lab 06/25/24  1134   HGB 13.7        - CBC, PTT/INR daily  - DVT ppx: Will start when appropriate    Endocrine:   - CTS Goal -140  - HgbA1c: Not recorded     PPx:   Feeding: Cardiac diet  Analgesia/Sedation: PRN pain meds  Thromboembolic Prevention: Will start  when appropiate  HOB >30: Yes  Stress Ulcer: Not indicated  Glucose Control: 110-140     Lines/Drains/Airway:  Right radial arterial line  PIVs     Dispo/Code Status/Palliative:    - SICU   - Full Code          Critical care was time spent personally by me on the following activities: development of treatment plan with patient or surrogate and bedside caregivers, discussions with consultants, evaluation of patient's response to treatment, examination of patient, ordering and performing treatments and interventions, ordering and review of laboratory studies, ordering and review of radiographic studies, pulse oximetry, re-evaluation of patient's condition.  This critical care time did not overlap with that of any other provider or involve time for any procedures.     Ruben Bennett MD  Critical Care - Surgery  [facility] - [facility]

## 2024-06-26 NOTE — SUBJECTIVE & OBJECTIVE
Past Medical History:   Diagnosis Date    History of hepatitis C, s/p successful Rx w/ SVR24 (cure) - 11/2018 2/23/2017    S/p epclusa w/ SVR    HTN (hypertension)     Hyperlipidemia     Osteoporosis        Past Surgical History:   Procedure Laterality Date    BREAST SURGERY  02/13/2007    Breast implants    COLONOSCOPY N/A 07/05/2022    Procedure: COLONOSCOPY;  Surgeon: Fran Palomares MD;  Location: [facility];  Service: Endoscopy;  Laterality: N/A;    EYE SURGERY  4/17/2019    Cataract surgery    TOTAL ABDOMINAL HYSTERECTOMY         Review of patient's allergies indicates:  No Known Allergies    Family History       Problem Relation (Age of Onset)    Hyperlipidemia Mother    Hypertension Mother    Stroke Father          Tobacco Use    Smoking status: Never     Passive exposure: Never    Smokeless tobacco: Never   Substance and Sexual Activity    Alcohol use: No    Drug use: No    Sexual activity: Not Currently     Partners: Male     Birth control/protection: None      Review of Systems   Constitutional:  Negative for chills and fever.   Eyes:  Negative for visual disturbance.   Respiratory:  Negative for shortness of breath.    Cardiovascular:  Negative for chest pain.   Gastrointestinal:  Positive for nausea. Negative for abdominal pain and vomiting.   Genitourinary:  Negative for difficulty urinating.   Neurological:  Positive for dizziness. Negative for headaches.     Objective:     Vital Signs (Most Recent):  Temp: 97.8 °F (36.6 °C) (06/25/24 1900)  Pulse: 65 (06/25/24 1900)  Resp: 16 (06/25/24 1900)  BP: (!) 112/57 (06/25/24 1900)  SpO2: 97 % (06/25/24 1900) Vital Signs (24h Range):  Temp:  [97.8 °F (36.6 °C)-98.2 °F (36.8 °C)] 97.8 °F (36.6 °C)  Pulse:  [65-86] 65  Resp:  [15-24] 16  SpO2:  [97 %-100 %] 97 %  BP: ()/(53-93) 112/57  Arterial Line BP: ()/(48-83) 112/60     Weight: 52.3 kg (115 lb 4.8 oz)  Body mass index is 23.29 kg/m².    No intake or output data in the 24 hours ending  06/25/24 1921       Physical Exam  HENT:      Head: Normocephalic and atraumatic.   Eyes:      Extraocular Movements: Extraocular movements intact.      Conjunctiva/sclera: Conjunctivae normal.   Cardiovascular:      Rate and Rhythm: Normal rate and regular rhythm.      Comments: Right radial A-line  Pulmonary:      Effort: Pulmonary effort is normal.   Skin:     General: Skin is warm and dry.   Neurological:      General: No focal deficit present.      Mental Status: She is alert and oriented to person, place, and time.      Comments: CN II-XII grossly intact  Motor and sensation intact in bilateral upper and lower extremities                 Lines/Drains/Airways       Arterial Line  Duration             Arterial Line 06/25/24 1500 Right Radial <1 day              Peripheral Intravenous Line  Duration                  Peripheral IV - Single Lumen 06/25/24 1130 20 G Right Antecubital <1 day         Peripheral IV - Single Lumen 06/25/24 1432 18 G Anterior;Left Forearm <1 day                    Significant Labs:    CBC/Anemia Profile:  Recent Labs   Lab 06/25/24  1134   WBC 9.84   HGB 13.7   HCT 39.9      MCV 88   RDW 12.4        Chemistries:  Recent Labs   Lab 06/25/24  1134   *   K 3.5   CL 96   CO2 24   BUN 13   CREATININE 0.5   CALCIUM 9.4   ALBUMIN 4.2   PROT 8.6*   BILITOT 0.5   ALKPHOS 73   ALT 11   AST 20   MG 2.1       All pertinent labs within the past 24 hours have been reviewed.    Significant Imaging: I have reviewed all pertinent imaging results/findings within the past 24 hours.

## 2024-06-26 NOTE — PROGRESS NOTES
Plans for surgical intervention in the morning.  Discussed with patient and  who agree with plan. Signed and held orders placed. NPO at midnight.  Type and screen. [place] contacted for surgical consents.

## 2024-06-26 NOTE — ASSESSMENT & PLAN NOTE
Neuro/Psych:   - Sedation: None  - Pain:     - 650 mg Tylenol PRN, Oxycodone PRN   - Q1 neurovascular checks                 Cardiac:   - BP Goal: MAP 60-70, HR 60-70. Impulse control  - Pressors: None  - Rhythm: NSR  - Cardene gtt and Esmolol gtt   - TTE pending  - Possible CTA today    Pulmonary:   - Goal SpO2 >92%  - On room air.       Renal:      Recent Labs   Lab 06/25/24  1134 06/26/24  0321   BUN 13 16   CREATININE 0.5 0.7          - Voiding spontaneous; monitor I/Os    - Trend BUN/Cr    FEN / GI:     - Daily CMP, PRN K/Mag/Phos per protocol     - Replace electrolytes as needed    - Nutrition: Cardiac Diet    - Bowel Regimen: Will start when appropriate    - LR at 95 cc/hr     ID:   - Afebrile  Recent Labs   Lab 06/25/24  1134 06/26/24  0321   WBC 9.84 12.46         Heme/Onc:   - Hgb 13.7 pre-operatively  Recent Labs   Lab 06/25/24  1134 06/26/24  0321   HGB 13.7 11.3*    353       - CBC, PTT/INR daily  - DVT ppx: Will start when appropriate    Endocrine:   - CTS Goal -140  - HgbA1c: Not recorded  - SSI     PPx:   Feeding: Cardiac diet  Analgesia/Sedation: PRN pain meds  Thromboembolic Prevention: Will start when appropiate  HOB >30: Yes  Stress Ulcer: Not indicated  Glucose Control: 110-140     Lines/Drains/Airway:  Right radial arterial line  PIVs     Dispo/Code Status/Palliative:    - SICU   - Full Code

## 2024-06-26 NOTE — HPI
73 y.o. female with PMH of HTN, HLD, anxiety disorder, history of hepatitis C s/p successful treatment in 2018, osteopenia who presents as a transfer from [facility] for evaluation by cardiothoracic surgery for a Type A Aortic dissection. Patient states that she began feeling dizzy and nauseous at 9 pm last night that persisted which prompted her to present to [facility]. She denies any changes in vision or headaches. Workup at [facility] with a CT showed  Aneurysmal dilation of the visualized ascending thoracic aorta with partially thrombosed ascending aortic dissection ([place] type A). Dissection extending into the right brachiocephalic, and proximal most right subclavian and right vertebral arteries. Continuation of the dissection into the right common carotid, carotid bulb and proximal most right internal/external carotid arteries. Labs were unremarkable.

## 2024-06-26 NOTE — PLAN OF CARE
"      SICU PLAN OF CARE NOTE    Dx: Type 1 dissection of thoracic aorta    Shift Events: CT scan today.  Michael placed.  Plan for OR tomorrow.    Goals of Care: MAP 60-70  HR 60-70    Neuro: AAO x4, Follows Commands, and Moves All Extremities    Vital Signs: BP (!) 103/54 (BP Location: Left arm, Patient Position: Lying)   Pulse 73   Temp 98.2 °F (36.8 °C) (Oral)   Resp 15   Ht 4' 11" (1.499 m)   Wt 52 kg (114 lb 10.2 oz)   SpO2 97%   BMI 23.15 kg/m²     Cardiac: NSR    Respiratory: Room Air    Diet: Cardiac Diet    Gtts: Esmolol, Nicardipine, and MIVF    Urine Output: Urinary Catheter  cc/hour    Labs/Accuchecks: daily labs.    Skin: Pt skin dry and intact.  No skin breakdown during shift.  Pt able to shift weight independently in bed.    View flowsheet for full assessment details.      "

## 2024-06-27 ENCOUNTER — ANESTHESIA (OUTPATIENT)
Dept: SURGERY | Facility: HOSPITAL | Age: 74
End: 2024-06-27
Payer: MEDICARE

## 2024-06-27 ENCOUNTER — PATIENT OUTREACH (OUTPATIENT)
Dept: ADMINISTRATIVE | Facility: HOSPITAL | Age: 74
End: 2024-06-27
Payer: MEDICARE

## 2024-06-27 DIAGNOSIS — Z98.890 S/P ASCENDING AORTIC ANEURYSM REPAIR: Primary | ICD-10-CM

## 2024-06-27 DIAGNOSIS — Z86.79 S/P ASCENDING AORTIC ANEURYSM REPAIR: Primary | ICD-10-CM

## 2024-06-27 DIAGNOSIS — Z98.890 POST-OPERATIVE STATE: ICD-10-CM

## 2024-06-27 PROBLEM — R73.9 STRESS HYPERGLYCEMIA: Status: ACTIVE | Noted: 2024-06-27

## 2024-06-27 LAB
ALBUMIN SERPL BCP-MCNC: 2.2 G/DL (ref 3.5–5.2)
ALBUMIN SERPL BCP-MCNC: 2.6 G/DL (ref 3.5–5.2)
ALBUMIN SERPL BCP-MCNC: 2.7 G/DL (ref 3.5–5.2)
ALLENS TEST: ABNORMAL
ALP SERPL-CCNC: 30 U/L (ref 55–135)
ALP SERPL-CCNC: 40 U/L (ref 55–135)
ALP SERPL-CCNC: 62 U/L (ref 55–135)
ALT SERPL W/O P-5'-P-CCNC: 15 U/L (ref 10–44)
ALT SERPL W/O P-5'-P-CCNC: 20 U/L (ref 10–44)
ALT SERPL W/O P-5'-P-CCNC: 9 U/L (ref 10–44)
ANION GAP SERPL CALC-SCNC: 10 MMOL/L (ref 8–16)
ANION GAP SERPL CALC-SCNC: 8 MMOL/L (ref 8–16)
ANION GAP SERPL CALC-SCNC: 9 MMOL/L (ref 8–16)
APTT PPP: 27.9 SEC (ref 21–32)
APTT PPP: 30 SEC (ref 21–32)
AST SERPL-CCNC: 20 U/L (ref 10–40)
AST SERPL-CCNC: 47 U/L (ref 10–40)
AST SERPL-CCNC: 49 U/L (ref 10–40)
BACTERIA UR CULT: NORMAL
BACTERIA UR CULT: NORMAL
BASOPHILS # BLD AUTO: 0.01 K/UL (ref 0–0.2)
BASOPHILS # BLD AUTO: 0.01 K/UL (ref 0–0.2)
BASOPHILS NFR BLD: 0.1 % (ref 0–1.9)
BASOPHILS NFR BLD: 0.1 % (ref 0–1.9)
BILIRUB SERPL-MCNC: 0.3 MG/DL (ref 0.1–1)
BILIRUB SERPL-MCNC: 0.6 MG/DL (ref 0.1–1)
BILIRUB SERPL-MCNC: 0.7 MG/DL (ref 0.1–1)
BLD PROD TYP BPU: NORMAL
BLOOD UNIT EXPIRATION DATE: NORMAL
BLOOD UNIT TYPE CODE: 5100
BLOOD UNIT TYPE CODE: 5100
BLOOD UNIT TYPE CODE: 9500
BLOOD UNIT TYPE: NORMAL
BUN SERPL-MCNC: 11 MG/DL (ref 8–23)
BUN SERPL-MCNC: 12 MG/DL (ref 8–23)
BUN SERPL-MCNC: 9 MG/DL (ref 8–23)
CA-I BLDV-SCNC: 0.93 MMOL/L (ref 1.06–1.42)
CALCIUM SERPL-MCNC: 6.9 MG/DL (ref 8.7–10.5)
CALCIUM SERPL-MCNC: 7.2 MG/DL (ref 8.7–10.5)
CALCIUM SERPL-MCNC: 8 MG/DL (ref 8.7–10.5)
CHLORIDE SERPL-SCNC: 107 MMOL/L (ref 95–110)
CHLORIDE SERPL-SCNC: 109 MMOL/L (ref 95–110)
CHLORIDE SERPL-SCNC: 113 MMOL/L (ref 95–110)
CO2 SERPL-SCNC: 17 MMOL/L (ref 23–29)
CO2 SERPL-SCNC: 21 MMOL/L (ref 23–29)
CO2 SERPL-SCNC: 24 MMOL/L (ref 23–29)
CODING SYSTEM: NORMAL
CREAT SERPL-MCNC: 0.6 MG/DL (ref 0.5–1.4)
CREAT SERPL-MCNC: 0.6 MG/DL (ref 0.5–1.4)
CREAT SERPL-MCNC: 0.7 MG/DL (ref 0.5–1.4)
CROSSMATCH INTERPRETATION: NORMAL
DELSYS: ABNORMAL
DIFFERENTIAL METHOD BLD: ABNORMAL
DIFFERENTIAL METHOD BLD: ABNORMAL
DISPENSE STATUS: NORMAL
EOSINOPHIL # BLD AUTO: 0 K/UL (ref 0–0.5)
EOSINOPHIL # BLD AUTO: 0 K/UL (ref 0–0.5)
EOSINOPHIL NFR BLD: 0 % (ref 0–8)
EOSINOPHIL NFR BLD: 0.4 % (ref 0–8)
ERYTHROCYTE [DISTWIDTH] IN BLOOD BY AUTOMATED COUNT: 12.6 % (ref 11.5–14.5)
ERYTHROCYTE [DISTWIDTH] IN BLOOD BY AUTOMATED COUNT: 13.1 % (ref 11.5–14.5)
ERYTHROCYTE [DISTWIDTH] IN BLOOD BY AUTOMATED COUNT: 13.2 % (ref 11.5–14.5)
ERYTHROCYTE [SEDIMENTATION RATE] IN BLOOD BY WESTERGREN METHOD: 18 MM/H
EST. GFR  (NO RACE VARIABLE): >60 ML/MIN/1.73 M^2
FIBRINOGEN PPP-MCNC: 273 MG/DL (ref 182–400)
FIBRINOGEN PPP-MCNC: 326 MG/DL (ref 182–400)
FIO2: 100
FIO2: 50
FIO2: 60
FIO2: 60
GLUCOSE SERPL-MCNC: 123 MG/DL (ref 70–110)
GLUCOSE SERPL-MCNC: 128 MG/DL (ref 70–110)
GLUCOSE SERPL-MCNC: 135 MG/DL (ref 70–110)
GLUCOSE SERPL-MCNC: 139 MG/DL (ref 70–110)
GLUCOSE SERPL-MCNC: 143 MG/DL (ref 70–110)
GLUCOSE SERPL-MCNC: 150 MG/DL (ref 70–110)
GLUCOSE SERPL-MCNC: 163 MG/DL (ref 70–110)
GLUCOSE SERPL-MCNC: 188 MG/DL (ref 70–110)
GLUCOSE SERPL-MCNC: 195 MG/DL (ref 70–110)
GLUCOSE SERPL-MCNC: 196 MG/DL (ref 70–110)
GLUCOSE SERPL-MCNC: 199 MG/DL (ref 70–110)
GLUCOSE SERPL-MCNC: 220 MG/DL (ref 70–110)
GLUCOSE SERPL-MCNC: 254 MG/DL (ref 70–110)
GLUCOSE SERPL-MCNC: 257 MG/DL (ref 70–110)
HCO3 UR-SCNC: 20.8 MMOL/L (ref 24–28)
HCO3 UR-SCNC: 21.5 MMOL/L (ref 24–28)
HCO3 UR-SCNC: 22.1 MMOL/L (ref 24–28)
HCO3 UR-SCNC: 22.5 MMOL/L (ref 24–28)
HCO3 UR-SCNC: 22.7 MMOL/L (ref 24–28)
HCO3 UR-SCNC: 23 MMOL/L (ref 24–28)
HCO3 UR-SCNC: 23.6 MMOL/L (ref 24–28)
HCO3 UR-SCNC: 24 MMOL/L (ref 24–28)
HCO3 UR-SCNC: 24.1 MMOL/L (ref 24–28)
HCO3 UR-SCNC: 24.2 MMOL/L (ref 24–28)
HCO3 UR-SCNC: 24.7 MMOL/L (ref 24–28)
HCO3 UR-SCNC: 24.8 MMOL/L (ref 24–28)
HCO3 UR-SCNC: 26.2 MMOL/L (ref 24–28)
HCO3 UR-SCNC: 26.4 MMOL/L (ref 24–28)
HCO3 UR-SCNC: 26.6 MMOL/L (ref 24–28)
HCO3 UR-SCNC: 27 MMOL/L (ref 24–28)
HCO3 UR-SCNC: 27.7 MMOL/L (ref 24–28)
HCO3 UR-SCNC: 32.1 MMOL/L (ref 24–28)
HCT VFR BLD AUTO: 20.3 % (ref 37–48.5)
HCT VFR BLD AUTO: 25.3 % (ref 37–48.5)
HCT VFR BLD AUTO: 33.1 % (ref 37–48.5)
HCT VFR BLD CALC: 17 %PCV (ref 36–54)
HCT VFR BLD CALC: 17 %PCV (ref 36–54)
HCT VFR BLD CALC: 18 %PCV (ref 36–54)
HCT VFR BLD CALC: 19 %PCV (ref 36–54)
HCT VFR BLD CALC: 20 %PCV (ref 36–54)
HCT VFR BLD CALC: 22 %PCV (ref 36–54)
HCT VFR BLD CALC: 23 %PCV (ref 36–54)
HCT VFR BLD CALC: 24 %PCV (ref 36–54)
HCT VFR BLD CALC: 25 %PCV (ref 36–54)
HCT VFR BLD CALC: 27 %PCV (ref 36–54)
HGB BLD-MCNC: 11 G/DL (ref 12–16)
HGB BLD-MCNC: 7.3 G/DL (ref 12–16)
HGB BLD-MCNC: 8.6 G/DL (ref 12–16)
IMM GRANULOCYTES # BLD AUTO: 0.04 K/UL (ref 0–0.04)
IMM GRANULOCYTES # BLD AUTO: 0.07 K/UL (ref 0–0.04)
IMM GRANULOCYTES NFR BLD AUTO: 0.4 % (ref 0–0.5)
IMM GRANULOCYTES NFR BLD AUTO: 0.7 % (ref 0–0.5)
INR PPP: 1.2 (ref 0.8–1.2)
INR PPP: 1.3 (ref 0.8–1.2)
LDH SERPL L TO P-CCNC: 0.49 MMOL/L (ref 0.36–1.25)
LDH SERPL L TO P-CCNC: 0.71 MMOL/L (ref 0.36–1.25)
LDH SERPL L TO P-CCNC: 1.39 MMOL/L (ref 0.36–1.25)
LDH SERPL L TO P-CCNC: 1.46 MMOL/L (ref 0.36–1.25)
LDH SERPL L TO P-CCNC: 1.62 MMOL/L (ref 0.36–1.25)
LDH SERPL L TO P-CCNC: 1.96 MMOL/L (ref 0.36–1.25)
LDH SERPL L TO P-CCNC: 2.09 MMOL/L (ref 0.36–1.25)
LDH SERPL L TO P-CCNC: 2.1 MMOL/L (ref 0.36–1.25)
LDH SERPL L TO P-CCNC: 2.35 MMOL/L (ref 0.36–1.25)
LDH SERPL L TO P-CCNC: 2.56 MMOL/L (ref 0.36–1.25)
LDH SERPL L TO P-CCNC: 3.28 MMOL/L (ref 0.36–1.25)
LDH SERPL L TO P-CCNC: 4.25 MMOL/L (ref 0.36–1.25)
LYMPHOCYTES # BLD AUTO: 0.3 K/UL (ref 1–4.8)
LYMPHOCYTES # BLD AUTO: 1.1 K/UL (ref 1–4.8)
LYMPHOCYTES NFR BLD: 11.3 % (ref 18–48)
LYMPHOCYTES NFR BLD: 2.9 % (ref 18–48)
MAGNESIUM SERPL-MCNC: 2.1 MG/DL (ref 1.6–2.6)
MAGNESIUM SERPL-MCNC: 2.3 MG/DL (ref 1.6–2.6)
MAGNESIUM SERPL-MCNC: 2.6 MG/DL (ref 1.6–2.6)
MCH RBC QN AUTO: 29.6 PG (ref 27–31)
MCH RBC QN AUTO: 31.4 PG (ref 27–31)
MCH RBC QN AUTO: 32 PG (ref 27–31)
MCHC RBC AUTO-ENTMCNC: 33.2 G/DL (ref 32–36)
MCHC RBC AUTO-ENTMCNC: 34 G/DL (ref 32–36)
MCHC RBC AUTO-ENTMCNC: 36 G/DL (ref 32–36)
MCV RBC AUTO: 89 FL (ref 82–98)
MCV RBC AUTO: 89 FL (ref 82–98)
MCV RBC AUTO: 92 FL (ref 82–98)
MIN VOL: 13.3
MIN VOL: 13.3
MIN VOL: 6.9
MIN VOL: 9.3
MODE: ABNORMAL
MONOCYTES # BLD AUTO: 0.7 K/UL (ref 0.3–1)
MONOCYTES # BLD AUTO: 1.1 K/UL (ref 0.3–1)
MONOCYTES NFR BLD: 10.2 % (ref 4–15)
MONOCYTES NFR BLD: 7.5 % (ref 4–15)
NEUTROPHILS # BLD AUTO: 7.8 K/UL (ref 1.8–7.7)
NEUTROPHILS # BLD AUTO: 9.2 K/UL (ref 1.8–7.7)
NEUTROPHILS NFR BLD: 80.3 % (ref 38–73)
NEUTROPHILS NFR BLD: 86.1 % (ref 38–73)
NRBC BLD-RTO: 0 /100 WBC
NRBC BLD-RTO: 0 /100 WBC
PCO2 BLDA: 32.3 MMHG (ref 35–45)
PCO2 BLDA: 34.1 MMHG (ref 35–45)
PCO2 BLDA: 34.5 MMHG (ref 35–45)
PCO2 BLDA: 35 MMHG (ref 35–45)
PCO2 BLDA: 35.4 MMHG (ref 35–45)
PCO2 BLDA: 36.5 MMHG (ref 35–45)
PCO2 BLDA: 36.6 MMHG (ref 35–45)
PCO2 BLDA: 37.5 MMHG (ref 35–45)
PCO2 BLDA: 37.9 MMHG (ref 35–45)
PCO2 BLDA: 39.1 MMHG (ref 35–45)
PCO2 BLDA: 39.6 MMHG (ref 35–45)
PCO2 BLDA: 39.9 MMHG (ref 35–45)
PCO2 BLDA: 41.6 MMHG (ref 35–45)
PCO2 BLDA: 50.4 MMHG (ref 35–45)
PCO2 BLDA: 52 MMHG (ref 35–45)
PCO2 BLDA: 53.5 MMHG (ref 35–45)
PCO2 BLDA: 64.4 MMHG (ref 35–45)
PCO2 BLDA: 73.7 MMHG (ref 35–45)
PEEP: 5
PH SMN: 7.22 [PH] (ref 7.35–7.45)
PH SMN: 7.25 [PH] (ref 7.35–7.45)
PH SMN: 7.3 [PH] (ref 7.35–7.45)
PH SMN: 7.32 [PH] (ref 7.35–7.45)
PH SMN: 7.33 [PH] (ref 7.35–7.45)
PH SMN: 7.36 [PH] (ref 7.35–7.45)
PH SMN: 7.37 [PH] (ref 7.35–7.45)
PH SMN: 7.38 [PH] (ref 7.35–7.45)
PH SMN: 7.4 [PH] (ref 7.35–7.45)
PH SMN: 7.4 [PH] (ref 7.35–7.45)
PH SMN: 7.41 [PH] (ref 7.35–7.45)
PH SMN: 7.42 [PH] (ref 7.35–7.45)
PH SMN: 7.42 [PH] (ref 7.35–7.45)
PH SMN: 7.43 [PH] (ref 7.35–7.45)
PH SMN: 7.44 [PH] (ref 7.35–7.45)
PH SMN: 7.46 [PH] (ref 7.35–7.45)
PHOSPHATE SERPL-MCNC: 1.6 MG/DL (ref 2.7–4.5)
PHOSPHATE SERPL-MCNC: 1.7 MG/DL (ref 2.7–4.5)
PHOSPHATE SERPL-MCNC: 2.1 MG/DL (ref 2.7–4.5)
PIP: 17
PIP: 17
PIP: 26
PIP: 30
PLATELET # BLD AUTO: 198 K/UL (ref 150–450)
PLATELET # BLD AUTO: 231 K/UL (ref 150–450)
PLATELET # BLD AUTO: 368 K/UL (ref 150–450)
PLATELET BLD QL SMEAR: NORMAL
PMV BLD AUTO: 8.8 FL (ref 9.2–12.9)
PMV BLD AUTO: 9.1 FL (ref 9.2–12.9)
PMV BLD AUTO: 9.2 FL (ref 9.2–12.9)
PO2 BLDA: 132 MMHG (ref 80–100)
PO2 BLDA: 133 MMHG (ref 80–100)
PO2 BLDA: 138 MMHG (ref 80–100)
PO2 BLDA: 149 MMHG (ref 80–100)
PO2 BLDA: 155 MMHG (ref 80–100)
PO2 BLDA: 161 MMHG (ref 80–100)
PO2 BLDA: 180 MMHG (ref 80–100)
PO2 BLDA: 202 MMHG (ref 80–100)
PO2 BLDA: 224 MMHG (ref 80–100)
PO2 BLDA: 225 MMHG (ref 80–100)
PO2 BLDA: 326 MMHG (ref 80–100)
PO2 BLDA: 328 MMHG (ref 80–100)
PO2 BLDA: 340 MMHG (ref 80–100)
PO2 BLDA: 412 MMHG (ref 80–100)
PO2 BLDA: 413 MMHG (ref 80–100)
PO2 BLDA: 449 MMHG (ref 80–100)
PO2 BLDA: 479 MMHG (ref 80–100)
PO2 BLDA: 71 MMHG (ref 40–60)
POC BE: -1 MMOL/L
POC BE: -2 MMOL/L
POC BE: -2 MMOL/L
POC BE: -3 MMOL/L
POC BE: -4 MMOL/L
POC BE: -4 MMOL/L
POC BE: 0 MMOL/L
POC BE: 2 MMOL/L
POC BE: 3 MMOL/L
POC BE: 5 MMOL/L
POC IONIZED CALCIUM: 0.91 MMOL/L (ref 1.06–1.42)
POC IONIZED CALCIUM: 0.91 MMOL/L (ref 1.06–1.42)
POC IONIZED CALCIUM: 0.92 MMOL/L (ref 1.06–1.42)
POC IONIZED CALCIUM: 0.97 MMOL/L (ref 1.06–1.42)
POC IONIZED CALCIUM: 1 MMOL/L (ref 1.06–1.42)
POC IONIZED CALCIUM: 1 MMOL/L (ref 1.06–1.42)
POC IONIZED CALCIUM: 1.01 MMOL/L (ref 1.06–1.42)
POC IONIZED CALCIUM: 1.02 MMOL/L (ref 1.06–1.42)
POC IONIZED CALCIUM: 1.03 MMOL/L (ref 1.06–1.42)
POC IONIZED CALCIUM: 1.03 MMOL/L (ref 1.06–1.42)
POC IONIZED CALCIUM: 1.04 MMOL/L (ref 1.06–1.42)
POC IONIZED CALCIUM: 1.05 MMOL/L (ref 1.06–1.42)
POC IONIZED CALCIUM: 1.06 MMOL/L (ref 1.06–1.42)
POC IONIZED CALCIUM: 1.1 MMOL/L (ref 1.06–1.42)
POC PCO2 TEMP: 29.3 MMHG
POC PCO2 TEMP: 34 MMHG
POC PCO2 TEMP: 38.6 MMHG
POC PCO2 TEMP: 39.8 MMHG
POC PCO2 TEMP: 43.6 MMHG
POC PCO2 TEMP: 43.7 MMHG
POC PH TEMP: 7.37
POC PH TEMP: 7.39
POC PH TEMP: 7.41
POC PH TEMP: 7.41
POC PH TEMP: 7.45
POC PH TEMP: 7.49
POC PO2 TEMP: 183 MMHG
POC PO2 TEMP: 191 MMHG
POC PO2 TEMP: 286 MMHG
POC PO2 TEMP: 289 MMHG
POC PO2 TEMP: 41 MMHG
POC PO2 TEMP: 92 MMHG
POC SATURATED O2: 100 % (ref 95–100)
POC SATURATED O2: 94 % (ref 95–100)
POC SATURATED O2: 99 % (ref 95–100)
POC TCO2: 22 MMOL/L (ref 23–27)
POC TCO2: 23 MMOL/L (ref 23–27)
POC TCO2: 23 MMOL/L (ref 23–27)
POC TCO2: 24 MMOL/L (ref 23–27)
POC TCO2: 25 MMOL/L (ref 23–27)
POC TCO2: 25 MMOL/L (ref 24–29)
POC TCO2: 26 MMOL/L (ref 23–27)
POC TCO2: 26 MMOL/L (ref 23–27)
POC TCO2: 28 MMOL/L (ref 23–27)
POC TCO2: 29 MMOL/L (ref 23–27)
POC TCO2: 34 MMOL/L (ref 23–27)
POC TEMPERATURE: ABNORMAL
POCT GLUCOSE: 106 MG/DL (ref 70–110)
POCT GLUCOSE: 123 MG/DL (ref 70–110)
POCT GLUCOSE: 136 MG/DL (ref 70–110)
POCT GLUCOSE: 138 MG/DL (ref 70–110)
POCT GLUCOSE: 140 MG/DL (ref 70–110)
POCT GLUCOSE: 149 MG/DL (ref 70–110)
POCT GLUCOSE: 152 MG/DL (ref 70–110)
POCT GLUCOSE: 192 MG/DL (ref 70–110)
POCT GLUCOSE: 194 MG/DL (ref 70–110)
POTASSIUM BLD-SCNC: 3.5 MMOL/L (ref 3.5–5.1)
POTASSIUM BLD-SCNC: 3.6 MMOL/L (ref 3.5–5.1)
POTASSIUM BLD-SCNC: 3.7 MMOL/L (ref 3.5–5.1)
POTASSIUM BLD-SCNC: 3.7 MMOL/L (ref 3.5–5.1)
POTASSIUM BLD-SCNC: 3.8 MMOL/L (ref 3.5–5.1)
POTASSIUM BLD-SCNC: 3.9 MMOL/L (ref 3.5–5.1)
POTASSIUM BLD-SCNC: 4 MMOL/L (ref 3.5–5.1)
POTASSIUM BLD-SCNC: 4 MMOL/L (ref 3.5–5.1)
POTASSIUM BLD-SCNC: 4.2 MMOL/L (ref 3.5–5.1)
POTASSIUM BLD-SCNC: 4.3 MMOL/L (ref 3.5–5.1)
POTASSIUM BLD-SCNC: 4.7 MMOL/L (ref 3.5–5.1)
POTASSIUM BLD-SCNC: 5.2 MMOL/L (ref 3.5–5.1)
POTASSIUM BLD-SCNC: 6.1 MMOL/L (ref 3.5–5.1)
POTASSIUM BLD-SCNC: 7.1 MMOL/L (ref 3.5–5.1)
POTASSIUM SERPL-SCNC: 3.6 MMOL/L (ref 3.5–5.1)
POTASSIUM SERPL-SCNC: 3.7 MMOL/L (ref 3.5–5.1)
POTASSIUM SERPL-SCNC: 3.8 MMOL/L (ref 3.5–5.1)
POTASSIUM SERPL-SCNC: 3.8 MMOL/L (ref 3.5–5.1)
PROT SERPL-MCNC: 4.5 G/DL (ref 6–8.4)
PROT SERPL-MCNC: 4.6 G/DL (ref 6–8.4)
PROT SERPL-MCNC: 6.2 G/DL (ref 6–8.4)
PROTHROMBIN TIME: 12.9 SEC (ref 9–12.5)
PROTHROMBIN TIME: 14 SEC (ref 9–12.5)
RBC # BLD AUTO: 2.28 M/UL (ref 4–5.4)
RBC # BLD AUTO: 2.74 M/UL (ref 4–5.4)
RBC # BLD AUTO: 3.71 M/UL (ref 4–5.4)
SAMPLE: ABNORMAL
SAMPLE: NORMAL
SAMPLE: NORMAL
SITE: ABNORMAL
SODIUM BLD-SCNC: 134 MMOL/L (ref 136–145)
SODIUM BLD-SCNC: 134 MMOL/L (ref 136–145)
SODIUM BLD-SCNC: 135 MMOL/L (ref 136–145)
SODIUM BLD-SCNC: 137 MMOL/L (ref 136–145)
SODIUM BLD-SCNC: 138 MMOL/L (ref 136–145)
SODIUM BLD-SCNC: 140 MMOL/L (ref 136–145)
SODIUM BLD-SCNC: 142 MMOL/L (ref 136–145)
SODIUM BLD-SCNC: 142 MMOL/L (ref 136–145)
SODIUM BLD-SCNC: 143 MMOL/L (ref 136–145)
SODIUM BLD-SCNC: 144 MMOL/L (ref 136–145)
SODIUM BLD-SCNC: 145 MMOL/L (ref 136–145)
SODIUM SERPL-SCNC: 134 MMOL/L (ref 136–145)
SODIUM SERPL-SCNC: 142 MMOL/L (ref 136–145)
SODIUM SERPL-SCNC: 142 MMOL/L (ref 136–145)
SP02: 100
UNIT NUMBER: NORMAL
VT: 350
WBC # BLD AUTO: 10.72 K/UL (ref 3.9–12.7)
WBC # BLD AUTO: 12.5 K/UL (ref 3.9–12.7)
WBC # BLD AUTO: 9.7 K/UL (ref 3.9–12.7)

## 2024-06-27 PROCEDURE — 5A1221Z PERFORMANCE OF CARDIAC OUTPUT, CONTINUOUS: ICD-10-PCS | Performed by: THORACIC SURGERY (CARDIOTHORACIC VASCULAR SURGERY)

## 2024-06-27 PROCEDURE — 25000003 PHARM REV CODE 250

## 2024-06-27 PROCEDURE — P9012 CRYOPRECIPITATE EACH UNIT: HCPCS | Performed by: THORACIC SURGERY (CARDIOTHORACIC VASCULAR SURGERY)

## 2024-06-27 PROCEDURE — 82803 BLOOD GASES ANY COMBINATION: CPT

## 2024-06-27 PROCEDURE — P9045 ALBUMIN (HUMAN), 5%, 250 ML: HCPCS | Mod: JZ,JG

## 2024-06-27 PROCEDURE — 99900035 HC TECH TIME PER 15 MIN (STAT)

## 2024-06-27 PROCEDURE — 84100 ASSAY OF PHOSPHORUS: CPT | Mod: 91 | Performed by: STUDENT IN AN ORGANIZED HEALTH CARE EDUCATION/TRAINING PROGRAM

## 2024-06-27 PROCEDURE — 82330 ASSAY OF CALCIUM: CPT

## 2024-06-27 PROCEDURE — 30233N1 TRANSFUSION OF NONAUTOLOGOUS RED BLOOD CELLS INTO PERIPHERAL VEIN, PERCUTANEOUS APPROACH: ICD-10-PCS | Performed by: THORACIC SURGERY (CARDIOTHORACIC VASCULAR SURGERY)

## 2024-06-27 PROCEDURE — 37000008 HC ANESTHESIA 1ST 15 MINUTES: Performed by: THORACIC SURGERY (CARDIOTHORACIC VASCULAR SURGERY)

## 2024-06-27 PROCEDURE — 88305 TISSUE EXAM BY PATHOLOGIST: CPT | Performed by: PATHOLOGY

## 2024-06-27 PROCEDURE — 36592 COLLECT BLOOD FROM PICC: CPT

## 2024-06-27 PROCEDURE — 34714 OPN FEM ART EXPOS CNDT CRTJ: CPT | Mod: RT,,, | Performed by: THORACIC SURGERY (CARDIOTHORACIC VASCULAR SURGERY)

## 2024-06-27 PROCEDURE — 85384 FIBRINOGEN ACTIVITY: CPT | Performed by: STUDENT IN AN ORGANIZED HEALTH CARE EDUCATION/TRAINING PROGRAM

## 2024-06-27 PROCEDURE — 27201423 OPTIME MED/SURG SUP & DEVICES STERILE SUPPLY: Performed by: THORACIC SURGERY (CARDIOTHORACIC VASCULAR SURGERY)

## 2024-06-27 PROCEDURE — 94002 VENT MGMT INPAT INIT DAY: CPT

## 2024-06-27 PROCEDURE — C1729 CATH, DRAINAGE: HCPCS | Performed by: THORACIC SURGERY (CARDIOTHORACIC VASCULAR SURGERY)

## 2024-06-27 PROCEDURE — 27201004 HC FEMORAL BYPASS CANNULA

## 2024-06-27 PROCEDURE — 63600175 PHARM REV CODE 636 W HCPCS

## 2024-06-27 PROCEDURE — 80053 COMPREHEN METABOLIC PANEL: CPT | Mod: 91 | Performed by: THORACIC SURGERY (CARDIOTHORACIC VASCULAR SURGERY)

## 2024-06-27 PROCEDURE — 80053 COMPREHEN METABOLIC PANEL: CPT | Mod: 91 | Performed by: STUDENT IN AN ORGANIZED HEALTH CARE EDUCATION/TRAINING PROGRAM

## 2024-06-27 PROCEDURE — 85610 PROTHROMBIN TIME: CPT | Performed by: STUDENT IN AN ORGANIZED HEALTH CARE EDUCATION/TRAINING PROGRAM

## 2024-06-27 PROCEDURE — 84132 ASSAY OF SERUM POTASSIUM: CPT

## 2024-06-27 PROCEDURE — 27000175 HC ADULT BYPASS PUMP

## 2024-06-27 PROCEDURE — 85730 THROMBOPLASTIN TIME PARTIAL: CPT | Performed by: STUDENT IN AN ORGANIZED HEALTH CARE EDUCATION/TRAINING PROGRAM

## 2024-06-27 PROCEDURE — 84295 ASSAY OF SERUM SODIUM: CPT

## 2024-06-27 PROCEDURE — P9017 PLASMA 1 DONOR FRZ W/IN 8 HR: HCPCS

## 2024-06-27 PROCEDURE — 37799 UNLISTED PX VASCULAR SURGERY: CPT

## 2024-06-27 PROCEDURE — 27202608 HC CANNULA, MISC

## 2024-06-27 PROCEDURE — 63600175 PHARM REV CODE 636 W HCPCS: Mod: JZ,JG

## 2024-06-27 PROCEDURE — P9035 PLATELET PHERES LEUKOREDUCED: HCPCS | Performed by: ANESTHESIOLOGY

## 2024-06-27 PROCEDURE — 84100 ASSAY OF PHOSPHORUS: CPT

## 2024-06-27 PROCEDURE — P9021 RED BLOOD CELLS UNIT: HCPCS

## 2024-06-27 PROCEDURE — 27000221 HC OXYGEN, UP TO 24 HOURS

## 2024-06-27 PROCEDURE — 33858 AS-AORT GRF F/AORTIC DSJ: CPT | Mod: ,,, | Performed by: THORACIC SURGERY (CARDIOTHORACIC VASCULAR SURGERY)

## 2024-06-27 PROCEDURE — 36000712 HC OR TIME LEV V 1ST 15 MIN: Performed by: THORACIC SURGERY (CARDIOTHORACIC VASCULAR SURGERY)

## 2024-06-27 PROCEDURE — 27100026 HC SHUNT SENSOR, TERUMO

## 2024-06-27 PROCEDURE — 27201037 HC PRESSURE MONITORING SET UP

## 2024-06-27 PROCEDURE — 82800 BLOOD PH: CPT

## 2024-06-27 PROCEDURE — 99291 CRITICAL CARE FIRST HOUR: CPT | Mod: ,,, | Performed by: ANESTHESIOLOGY

## 2024-06-27 PROCEDURE — 36000713 HC OR TIME LEV V EA ADD 15 MIN: Performed by: THORACIC SURGERY (CARDIOTHORACIC VASCULAR SURGERY)

## 2024-06-27 PROCEDURE — C1768 GRAFT, VASCULAR: HCPCS | Performed by: THORACIC SURGERY (CARDIOTHORACIC VASCULAR SURGERY)

## 2024-06-27 PROCEDURE — 27800903 OPTIME MED/SURG SUP & DEVICES OTHER IMPLANTS: Performed by: THORACIC SURGERY (CARDIOTHORACIC VASCULAR SURGERY)

## 2024-06-27 PROCEDURE — 85025 COMPLETE CBC W/AUTO DIFF WBC: CPT | Mod: 91 | Performed by: THORACIC SURGERY (CARDIOTHORACIC VASCULAR SURGERY)

## 2024-06-27 PROCEDURE — 88305 TISSUE EXAM BY PATHOLOGIST: CPT | Mod: 26,,, | Performed by: PATHOLOGY

## 2024-06-27 PROCEDURE — 27000191 HC C-V MONITORING

## 2024-06-27 PROCEDURE — 33866 AORTIC HEMIARCH GRAFT: CPT | Mod: ,,, | Performed by: THORACIC SURGERY (CARDIOTHORACIC VASCULAR SURGERY)

## 2024-06-27 PROCEDURE — 30233M1 TRANSFUSION OF NONAUTOLOGOUS PLASMA CRYOPRECIPITATE INTO PERIPHERAL VEIN, PERCUTANEOUS APPROACH: ICD-10-PCS | Performed by: THORACIC SURGERY (CARDIOTHORACIC VASCULAR SURGERY)

## 2024-06-27 PROCEDURE — 36556 INSERT NON-TUNNEL CV CATH: CPT

## 2024-06-27 PROCEDURE — 99223 1ST HOSP IP/OBS HIGH 75: CPT | Mod: ,,, | Performed by: PHYSICIAN ASSISTANT

## 2024-06-27 PROCEDURE — 27201015 HC HEMO-CONCENTRATOR

## 2024-06-27 PROCEDURE — 04QK0ZZ REPAIR RIGHT FEMORAL ARTERY, OPEN APPROACH: ICD-10-PCS | Performed by: THORACIC SURGERY (CARDIOTHORACIC VASCULAR SURGERY)

## 2024-06-27 PROCEDURE — 27200953 HC CARDIOPLEGIA SYSTEM

## 2024-06-27 PROCEDURE — 27100088 HC CELL SAVER

## 2024-06-27 PROCEDURE — 80053 COMPREHEN METABOLIC PANEL: CPT

## 2024-06-27 PROCEDURE — 85014 HEMATOCRIT: CPT

## 2024-06-27 PROCEDURE — 37000009 HC ANESTHESIA EA ADD 15 MINS: Performed by: THORACIC SURGERY (CARDIOTHORACIC VASCULAR SURGERY)

## 2024-06-27 PROCEDURE — 86920 COMPATIBILITY TEST SPIN: CPT

## 2024-06-27 PROCEDURE — 83735 ASSAY OF MAGNESIUM: CPT | Mod: 91 | Performed by: STUDENT IN AN ORGANIZED HEALTH CARE EDUCATION/TRAINING PROGRAM

## 2024-06-27 PROCEDURE — 02RX0JZ REPLACEMENT OF THORACIC AORTA, ASCENDING/ARCH WITH SYNTHETIC SUBSTITUTE, OPEN APPROACH: ICD-10-PCS | Performed by: THORACIC SURGERY (CARDIOTHORACIC VASCULAR SURGERY)

## 2024-06-27 PROCEDURE — 85384 FIBRINOGEN ACTIVITY: CPT | Mod: 91 | Performed by: THORACIC SURGERY (CARDIOTHORACIC VASCULAR SURGERY)

## 2024-06-27 PROCEDURE — 85730 THROMBOPLASTIN TIME PARTIAL: CPT | Mod: 91 | Performed by: THORACIC SURGERY (CARDIOTHORACIC VASCULAR SURGERY)

## 2024-06-27 PROCEDURE — 30233K1 TRANSFUSION OF NONAUTOLOGOUS FROZEN PLASMA INTO PERIPHERAL VEIN, PERCUTANEOUS APPROACH: ICD-10-PCS | Performed by: THORACIC SURGERY (CARDIOTHORACIC VASCULAR SURGERY)

## 2024-06-27 PROCEDURE — 85520 HEPARIN ASSAY: CPT

## 2024-06-27 PROCEDURE — 63600175 PHARM REV CODE 636 W HCPCS: Performed by: STUDENT IN AN ORGANIZED HEALTH CARE EDUCATION/TRAINING PROGRAM

## 2024-06-27 PROCEDURE — 83735 ASSAY OF MAGNESIUM: CPT

## 2024-06-27 PROCEDURE — 83735 ASSAY OF MAGNESIUM: CPT | Mod: 91 | Performed by: THORACIC SURGERY (CARDIOTHORACIC VASCULAR SURGERY)

## 2024-06-27 PROCEDURE — 30233R1 TRANSFUSION OF NONAUTOLOGOUS PLATELETS INTO PERIPHERAL VEIN, PERCUTANEOUS APPROACH: ICD-10-PCS | Performed by: THORACIC SURGERY (CARDIOTHORACIC VASCULAR SURGERY)

## 2024-06-27 PROCEDURE — 27100171 HC OXYGEN HIGH FLOW UP TO 24 HOURS

## 2024-06-27 PROCEDURE — 20000000 HC ICU ROOM

## 2024-06-27 PROCEDURE — 94761 N-INVAS EAR/PLS OXIMETRY MLT: CPT | Mod: XB

## 2024-06-27 PROCEDURE — 85025 COMPLETE CBC W/AUTO DIFF WBC: CPT

## 2024-06-27 PROCEDURE — 85027 COMPLETE CBC AUTOMATED: CPT | Performed by: STUDENT IN AN ORGANIZED HEALTH CARE EDUCATION/TRAINING PROGRAM

## 2024-06-27 PROCEDURE — 82330 ASSAY OF CALCIUM: CPT | Performed by: THORACIC SURGERY (CARDIOTHORACIC VASCULAR SURGERY)

## 2024-06-27 PROCEDURE — 85610 PROTHROMBIN TIME: CPT | Mod: 91 | Performed by: THORACIC SURGERY (CARDIOTHORACIC VASCULAR SURGERY)

## 2024-06-27 PROCEDURE — 25000003 PHARM REV CODE 250: Performed by: STUDENT IN AN ORGANIZED HEALTH CARE EDUCATION/TRAINING PROGRAM

## 2024-06-27 PROCEDURE — 84100 ASSAY OF PHOSPHORUS: CPT | Mod: 91 | Performed by: THORACIC SURGERY (CARDIOTHORACIC VASCULAR SURGERY)

## 2024-06-27 PROCEDURE — 86965 POOLING BLOOD PLATELETS: CPT | Performed by: THORACIC SURGERY (CARDIOTHORACIC VASCULAR SURGERY)

## 2024-06-27 PROCEDURE — 83605 ASSAY OF LACTIC ACID: CPT

## 2024-06-27 PROCEDURE — 27202828 HC DILATOR KIT, VASCULAR

## 2024-06-27 DEVICE — FELT THIN 4INX4IN 5EA/BX: Type: IMPLANTABLE DEVICE | Site: AORTA | Status: FUNCTIONAL

## 2024-06-27 DEVICE — PUTTY HEMASORB BONE RESORBABLE: Type: IMPLANTABLE DEVICE | Site: STERNUM | Status: FUNCTIONAL

## 2024-06-27 DEVICE — HEMASHIELD PLATINUM WOVEN 1 BRANCH AORTIC ARCH DOUBLE VELOUR VASCULAR GRAFT WITH GRAFT SIZER ACCESSORY
Type: IMPLANTABLE DEVICE | Site: AORTA | Status: FUNCTIONAL
Brand: HEMASHIELD

## 2024-06-27 RX ORDER — ROCURONIUM BROMIDE 10 MG/ML
INJECTION, SOLUTION INTRAVENOUS
Status: DISCONTINUED | OUTPATIENT
Start: 2024-06-27 | End: 2024-06-27

## 2024-06-27 RX ORDER — POTASSIUM CHLORIDE 29.8 MG/ML
40 INJECTION INTRAVENOUS
Status: DISCONTINUED | OUTPATIENT
Start: 2024-06-27 | End: 2024-06-29

## 2024-06-27 RX ORDER — POLYETHYLENE GLYCOL 3350 17 G/17G
17 POWDER, FOR SOLUTION ORAL DAILY
Status: DISCONTINUED | OUTPATIENT
Start: 2024-06-27 | End: 2024-07-02 | Stop reason: HOSPADM

## 2024-06-27 RX ORDER — DOCUSATE SODIUM 100 MG/1
100 CAPSULE, LIQUID FILLED ORAL 2 TIMES DAILY
Status: DISCONTINUED | OUTPATIENT
Start: 2024-06-27 | End: 2024-07-02 | Stop reason: HOSPADM

## 2024-06-27 RX ORDER — ASPIRIN 325 MG
325 TABLET ORAL DAILY
Status: DISCONTINUED | OUTPATIENT
Start: 2024-06-27 | End: 2024-07-02 | Stop reason: HOSPADM

## 2024-06-27 RX ORDER — NOREPINEPHRINE BITARTRATE/D5W 4MG/250ML
0-3 PLASTIC BAG, INJECTION (ML) INTRAVENOUS CONTINUOUS
Status: DISCONTINUED | OUTPATIENT
Start: 2024-06-27 | End: 2024-06-28

## 2024-06-27 RX ORDER — FENTANYL CITRATE 50 UG/ML
INJECTION, SOLUTION INTRAMUSCULAR; INTRAVENOUS
Status: DISCONTINUED | OUTPATIENT
Start: 2024-06-27 | End: 2024-06-27

## 2024-06-27 RX ORDER — METHADONE IN 0.9 % SOD.CHLORID 1 MG/ML(1)
SYRINGE (ML) INTRAVENOUS
Status: DISCONTINUED | OUTPATIENT
Start: 2024-06-27 | End: 2024-06-27

## 2024-06-27 RX ORDER — ASPIRIN 325 MG
325 TABLET ORAL DAILY
Status: DISCONTINUED | OUTPATIENT
Start: 2024-06-27 | End: 2024-06-27

## 2024-06-27 RX ORDER — POTASSIUM CHLORIDE 14.9 MG/ML
20 INJECTION INTRAVENOUS
Status: DISCONTINUED | OUTPATIENT
Start: 2024-06-27 | End: 2024-06-29

## 2024-06-27 RX ORDER — PROCHLORPERAZINE EDISYLATE 5 MG/ML
5 INJECTION INTRAMUSCULAR; INTRAVENOUS EVERY 6 HOURS PRN
Status: DISCONTINUED | OUTPATIENT
Start: 2024-06-27 | End: 2024-07-02 | Stop reason: HOSPADM

## 2024-06-27 RX ORDER — ACETAMINOPHEN 325 MG/1
650 TABLET ORAL EVERY 6 HOURS PRN
Status: DISCONTINUED | OUTPATIENT
Start: 2024-06-27 | End: 2024-06-27

## 2024-06-27 RX ORDER — CALCIUM GLUCONATE 20 MG/ML
3 INJECTION, SOLUTION INTRAVENOUS
Status: DISCONTINUED | OUTPATIENT
Start: 2024-06-27 | End: 2024-06-30

## 2024-06-27 RX ORDER — FENTANYL CITRATE 50 UG/ML
25 INJECTION, SOLUTION INTRAMUSCULAR; INTRAVENOUS
Status: DISCONTINUED | OUTPATIENT
Start: 2024-06-27 | End: 2024-06-27

## 2024-06-27 RX ORDER — TRANEXAMIC ACID 100 MG/ML
INJECTION, SOLUTION INTRAVENOUS CONTINUOUS PRN
Status: DISCONTINUED | OUTPATIENT
Start: 2024-06-27 | End: 2024-06-27

## 2024-06-27 RX ORDER — FAMOTIDINE 10 MG/ML
20 INJECTION INTRAVENOUS 2 TIMES DAILY
Status: DISCONTINUED | OUTPATIENT
Start: 2024-06-27 | End: 2024-06-28

## 2024-06-27 RX ORDER — HYDROCODONE BITARTRATE AND ACETAMINOPHEN 500; 5 MG/1; MG/1
TABLET ORAL
Status: DISCONTINUED | OUTPATIENT
Start: 2024-06-27 | End: 2024-06-27

## 2024-06-27 RX ORDER — CALCIUM GLUCONATE 20 MG/ML
1 INJECTION, SOLUTION INTRAVENOUS
Status: DISCONTINUED | OUTPATIENT
Start: 2024-06-27 | End: 2024-06-30

## 2024-06-27 RX ORDER — IPRATROPIUM BROMIDE AND ALBUTEROL SULFATE 2.5; .5 MG/3ML; MG/3ML
3 SOLUTION RESPIRATORY (INHALATION) EVERY 4 HOURS PRN
Status: DISCONTINUED | OUTPATIENT
Start: 2024-06-27 | End: 2024-07-02 | Stop reason: HOSPADM

## 2024-06-27 RX ORDER — CEFAZOLIN SODIUM 1 G/3ML
INJECTION, POWDER, FOR SOLUTION INTRAMUSCULAR; INTRAVENOUS
Status: DISCONTINUED | OUTPATIENT
Start: 2024-06-27 | End: 2024-06-27

## 2024-06-27 RX ORDER — MAGNESIUM SULFATE HEPTAHYDRATE 40 MG/ML
2 INJECTION, SOLUTION INTRAVENOUS
Status: DISCONTINUED | OUTPATIENT
Start: 2024-06-27 | End: 2024-06-29

## 2024-06-27 RX ORDER — KETAMINE HCL IN 0.9 % NACL 50 MG/5 ML
SYRINGE (ML) INTRAVENOUS
Status: DISCONTINUED | OUTPATIENT
Start: 2024-06-27 | End: 2024-06-27

## 2024-06-27 RX ORDER — OXYCODONE HYDROCHLORIDE 5 MG/1
5 TABLET ORAL EVERY 4 HOURS PRN
Status: DISCONTINUED | OUTPATIENT
Start: 2024-06-27 | End: 2024-07-02 | Stop reason: HOSPADM

## 2024-06-27 RX ORDER — ATORVASTATIN CALCIUM 40 MG/1
40 TABLET, FILM COATED ORAL DAILY
Status: DISCONTINUED | OUTPATIENT
Start: 2024-06-28 | End: 2024-07-02 | Stop reason: HOSPADM

## 2024-06-27 RX ORDER — ONDANSETRON HYDROCHLORIDE 2 MG/ML
4 INJECTION, SOLUTION INTRAVENOUS EVERY 6 HOURS PRN
Status: DISCONTINUED | OUTPATIENT
Start: 2024-06-27 | End: 2024-07-02 | Stop reason: HOSPADM

## 2024-06-27 RX ORDER — ALBUMIN HUMAN 50 G/1000ML
25 SOLUTION INTRAVENOUS ONCE
Status: COMPLETED | OUTPATIENT
Start: 2024-06-27 | End: 2024-06-27

## 2024-06-27 RX ORDER — LIDOCAINE HYDROCHLORIDE 20 MG/ML
INJECTION, SOLUTION EPIDURAL; INFILTRATION; INTRACAUDAL; PERINEURAL
Status: DISCONTINUED | OUTPATIENT
Start: 2024-06-27 | End: 2024-06-27

## 2024-06-27 RX ORDER — MUPIROCIN 20 MG/G
OINTMENT TOPICAL 2 TIMES DAILY
Status: COMPLETED | OUTPATIENT
Start: 2024-06-27 | End: 2024-06-29

## 2024-06-27 RX ORDER — ONDANSETRON 2 MG/ML
INJECTION INTRAMUSCULAR; INTRAVENOUS
Status: DISCONTINUED | OUTPATIENT
Start: 2024-06-27 | End: 2024-06-27

## 2024-06-27 RX ORDER — POTASSIUM CHLORIDE 14.9 MG/ML
60 INJECTION INTRAVENOUS
Status: DISCONTINUED | OUTPATIENT
Start: 2024-06-27 | End: 2024-06-29

## 2024-06-27 RX ORDER — CALCIUM GLUCONATE 20 MG/ML
2 INJECTION, SOLUTION INTRAVENOUS
Status: DISCONTINUED | OUTPATIENT
Start: 2024-06-27 | End: 2024-06-30

## 2024-06-27 RX ORDER — MAGNESIUM SULFATE HEPTAHYDRATE 40 MG/ML
4 INJECTION, SOLUTION INTRAVENOUS
Status: DISCONTINUED | OUTPATIENT
Start: 2024-06-27 | End: 2024-06-29

## 2024-06-27 RX ORDER — FUROSEMIDE 10 MG/ML
10 INJECTION INTRAMUSCULAR; INTRAVENOUS ONCE
Status: COMPLETED | OUTPATIENT
Start: 2024-06-28 | End: 2024-06-28

## 2024-06-27 RX ORDER — OXYCODONE HYDROCHLORIDE 10 MG/1
10 TABLET ORAL EVERY 4 HOURS PRN
Status: DISCONTINUED | OUTPATIENT
Start: 2024-06-27 | End: 2024-07-02 | Stop reason: HOSPADM

## 2024-06-27 RX ORDER — PROPOFOL 10 MG/ML
0-50 INJECTION, EMULSION INTRAVENOUS CONTINUOUS
Status: DISCONTINUED | OUTPATIENT
Start: 2024-06-27 | End: 2024-06-28

## 2024-06-27 RX ORDER — FENTANYL CITRATE 50 UG/ML
25 INJECTION, SOLUTION INTRAMUSCULAR; INTRAVENOUS
Status: DISCONTINUED | OUTPATIENT
Start: 2024-06-27 | End: 2024-06-28

## 2024-06-27 RX ORDER — PROPOFOL 10 MG/ML
VIAL (ML) INTRAVENOUS
Status: DISCONTINUED | OUTPATIENT
Start: 2024-06-27 | End: 2024-06-27

## 2024-06-27 RX ORDER — ACETAMINOPHEN 500 MG
1000 TABLET ORAL EVERY 8 HOURS
Status: DISCONTINUED | OUTPATIENT
Start: 2024-06-27 | End: 2024-07-02 | Stop reason: HOSPADM

## 2024-06-27 RX ORDER — TRANEXAMIC ACID 100 MG/ML
INJECTION, SOLUTION INTRAVENOUS
Status: DISCONTINUED | OUTPATIENT
Start: 2024-06-27 | End: 2024-06-27

## 2024-06-27 RX ORDER — MIDAZOLAM HYDROCHLORIDE 1 MG/ML
INJECTION INTRAMUSCULAR; INTRAVENOUS
Status: DISCONTINUED | OUTPATIENT
Start: 2024-06-27 | End: 2024-06-27

## 2024-06-27 RX ADMIN — POTASSIUM BICARBONATE 50 MEQ: 978 TABLET, EFFERVESCENT ORAL at 05:06

## 2024-06-27 RX ADMIN — ONDANSETRON 1000 MG: 2 INJECTION INTRAMUSCULAR; INTRAVENOUS at 01:06

## 2024-06-27 RX ADMIN — CEFAZOLIN 2 G: 330 INJECTION, POWDER, FOR SOLUTION INTRAMUSCULAR; INTRAVENOUS at 09:06

## 2024-06-27 RX ADMIN — EPINEPHRINE 0.1 MCG/KG/MIN: 1 INJECTION INTRAMUSCULAR; INTRAVENOUS; SUBCUTANEOUS at 01:06

## 2024-06-27 RX ADMIN — SUGAMMADEX 200 MG: 100 INJECTION, SOLUTION INTRAVENOUS at 03:06

## 2024-06-27 RX ADMIN — TRANEXAMIC ACID 1 MG/KG/HR: 100 INJECTION INTRAVENOUS at 07:06

## 2024-06-27 RX ADMIN — MIDAZOLAM HYDROCHLORIDE 2 MG: 1 INJECTION, SOLUTION INTRAMUSCULAR; INTRAVENOUS at 10:06

## 2024-06-27 RX ADMIN — CEFAZOLIN 2 G: 330 INJECTION, POWDER, FOR SOLUTION INTRAMUSCULAR; INTRAVENOUS at 11:06

## 2024-06-27 RX ADMIN — ROCURONIUM BROMIDE 20 MG: 10 INJECTION, SOLUTION INTRAVENOUS at 10:06

## 2024-06-27 RX ADMIN — Medication 15 MG: at 07:06

## 2024-06-27 RX ADMIN — CEFAZOLIN 2 G: 2 INJECTION, POWDER, FOR SOLUTION INTRAMUSCULAR; INTRAVENOUS at 07:06

## 2024-06-27 RX ADMIN — ACETAMINOPHEN 1000 MG: 500 TABLET ORAL at 09:06

## 2024-06-27 RX ADMIN — ROCURONIUM BROMIDE 90 MG: 10 INJECTION, SOLUTION INTRAVENOUS at 07:06

## 2024-06-27 RX ADMIN — MUPIROCIN: 20 OINTMENT TOPICAL at 09:06

## 2024-06-27 RX ADMIN — ROCURONIUM BROMIDE 30 MG: 10 INJECTION, SOLUTION INTRAVENOUS at 09:06

## 2024-06-27 RX ADMIN — SODIUM CHLORIDE, SODIUM GLUCONATE, SODIUM ACETATE, POTASSIUM CHLORIDE, MAGNESIUM CHLORIDE, SODIUM PHOSPHATE, DIBASIC, AND POTASSIUM PHOSPHATE: .53; .5; .37; .037; .03; .012; .00082 INJECTION, SOLUTION INTRAVENOUS at 01:06

## 2024-06-27 RX ADMIN — ROCURONIUM BROMIDE 1 MG: 10 INJECTION, SOLUTION INTRAVENOUS at 09:06

## 2024-06-27 RX ADMIN — Medication 10.5 MG: at 07:06

## 2024-06-27 RX ADMIN — SODIUM CHLORIDE, SODIUM GLUCONATE, SODIUM ACETATE, POTASSIUM CHLORIDE, MAGNESIUM CHLORIDE, SODIUM PHOSPHATE, DIBASIC, AND POTASSIUM PHOSPHATE: .53; .5; .37; .037; .03; .012; .00082 INJECTION, SOLUTION INTRAVENOUS at 11:06

## 2024-06-27 RX ADMIN — TRANEXAMIC ACID 1000 MG: 100 INJECTION INTRAVENOUS at 11:06

## 2024-06-27 RX ADMIN — OXYCODONE HYDROCHLORIDE 10 MG: 10 TABLET ORAL at 04:06

## 2024-06-27 RX ADMIN — SODIUM CHLORIDE, SODIUM GLUCONATE, SODIUM ACETATE, POTASSIUM CHLORIDE, MAGNESIUM CHLORIDE, SODIUM PHOSPHATE, DIBASIC, AND POTASSIUM PHOSPHATE: .53; .5; .37; .037; .03; .012; .00082 INJECTION, SOLUTION INTRAVENOUS at 07:06

## 2024-06-27 RX ADMIN — PROPOFOL 150 MG: 10 INJECTION, EMULSION INTRAVENOUS at 07:06

## 2024-06-27 RX ADMIN — ACETAMINOPHEN 1000 MG: 500 TABLET ORAL at 04:06

## 2024-06-27 RX ADMIN — NOREPINEPHRINE BITARTRATE 0.02 MCG/KG/MIN: 1 INJECTION, SOLUTION, CONCENTRATE INTRAVENOUS at 09:06

## 2024-06-27 RX ADMIN — LIDOCAINE HYDROCHLORIDE 100 MG: 20 INJECTION, SOLUTION EPIDURAL; INFILTRATION; INTRACAUDAL; PERINEURAL at 07:06

## 2024-06-27 RX ADMIN — NOREPINEPHRINE BITARTRATE 0.02 MCG/KG/MIN: 1 INJECTION, SOLUTION, CONCENTRATE INTRAVENOUS at 07:06

## 2024-06-27 RX ADMIN — FAMOTIDINE 20 MG: 10 INJECTION INTRAVENOUS at 09:06

## 2024-06-27 RX ADMIN — POTASSIUM & SODIUM PHOSPHATES POWDER PACK 280-160-250 MG 2 PACKET: 280-160-250 PACK at 05:06

## 2024-06-27 RX ADMIN — VASOPRESSIN 0.04 UNITS/MIN: 20 INJECTION INTRAVENOUS at 10:06

## 2024-06-27 RX ADMIN — FENTANYL CITRATE 25 MCG: 50 INJECTION INTRAMUSCULAR; INTRAVENOUS at 04:06

## 2024-06-27 RX ADMIN — ALBUMIN (HUMAN) 25 G: 12.5 SOLUTION INTRAVENOUS at 04:06

## 2024-06-27 RX ADMIN — FENTANYL CITRATE 50 MCG: 50 INJECTION, SOLUTION INTRAMUSCULAR; INTRAVENOUS at 07:06

## 2024-06-27 RX ADMIN — SODIUM PHOSPHATE, MONOBASIC, MONOHYDRATE AND SODIUM PHOSPHATE, DIBASIC, ANHYDROUS 20.01 MMOL: 142; 276 INJECTION, SOLUTION INTRAVENOUS at 08:06

## 2024-06-27 RX ADMIN — DOCUSATE SODIUM 100 MG: 100 CAPSULE, LIQUID FILLED ORAL at 09:06

## 2024-06-27 RX ADMIN — FENTANYL CITRATE 50 MCG: 50 INJECTION, SOLUTION INTRAMUSCULAR; INTRAVENOUS at 02:06

## 2024-06-27 RX ADMIN — PROPOFOL 35 MCG/KG/MIN: 10 INJECTION, EMULSION INTRAVENOUS at 11:06

## 2024-06-27 RX ADMIN — PROPOFOL 50 MG: 10 INJECTION, EMULSION INTRAVENOUS at 10:06

## 2024-06-27 RX ADMIN — SODIUM CHLORIDE 2 UNITS/HR: 9 INJECTION, SOLUTION INTRAVENOUS at 11:06

## 2024-06-27 RX ADMIN — POTASSIUM CHLORIDE 20 MEQ: 200 INJECTION, SOLUTION INTRAVENOUS at 04:06

## 2024-06-27 RX ADMIN — ALBUMIN (HUMAN) 25 G: 12.5 SOLUTION INTRAVENOUS at 05:06

## 2024-06-27 RX ADMIN — METHADONE HYDROCHLORIDE 10.5 MG: 10 INJECTION, SOLUTION INTRAMUSCULAR; INTRAVENOUS; SUBCUTANEOUS at 07:06

## 2024-06-27 RX ADMIN — ASPIRIN 325 MG ORAL TABLET 325 MG: 325 PILL ORAL at 06:06

## 2024-06-27 NOTE — ANESTHESIA PROCEDURE NOTES
Intubation    Date/Time: 6/27/2024 7:18 AM    Performed by: Sumit Paula DO  Authorized by: Favian Fajardo MD    Intubation:     Induction:  Intravenous    Intubated:  Postinduction    Mask Ventilation:  Easy mask    Attempts:  1    Attempted By:  Resident anesthesiologist    Method of Intubation:  Direct    Blade:  Pulido 2    Laryngeal View Grade: Grade I - full view of cords      Difficult Airway Encountered?: No      Complications:  None    Airway Device Size:  7.0    Style/Cuff Inflation:  Cuffed (inflated to minimal occlusive pressure)    Tube secured:  22    Placement Verified By:  Capnometry    Complicating Factors:  None    Findings Post-Intubation:  BS equal bilateral and atraumatic/condition of teeth unchanged  Notes:      Placed oral airway

## 2024-06-27 NOTE — PLAN OF CARE
SICU PLAN OF CARE    Dx: Type 1 dissection of thoracic aorta    Goals of Care: MAP 60-70, HR 60-70    Vital Signs (last 12 hours):   Temp:  [97.4 °F (36.3 °C)-98.8 °F (37.1 °C)]   Pulse:  [66-79]   Resp:  [9-31]   BP: (100-111)/(54-59)   SpO2:  [92 %-99 %]   Arterial Line BP: ()/(44-58)      Neuro: AAOx4, Follows commands , and Moves all extremities spontaneously     Cardiac: NSR    Respiratory: Room Air    Gtts: MIVF, Esmolol, and Cardene    Urine Output: Urethral Catheter  665 mL/shift    Diet: NPO      Labs/Accuchecks: Daily labs    Skin:  Ne redness or breakdown noted. Sacral foam and bilateral protective heel foams in place. SCD's on and working. Patient turns independently, bony prominences protected, and waffle mattress inflated and working correctly.     Shift Events:  500 Albumin given for low UOP, patient responded appropriately. Surgery team at bedside at 0700 to bring patient for ascending aorta repair. See flowsheet for further assessment/details.  Family updated on current condition/plan of care, questions answered, and emotional support provided.  MD updated on current condition, vitals, labs, and gtts.

## 2024-06-27 NOTE — SUBJECTIVE & OBJECTIVE
Interval History/Significant Events: CTA performed yesterday with new findings of evolving R PICA infarct with no signs of hemorrhage and dissection flap extending into R brachiocephalic artery and severely narrowing R vertebral artery. All other findings similar to previous imaging. Dr. Sharma made decision to go to OR today. UOP decreased to 10-20 cc/hr; given 500cc albumin with adequate increase in UOP. Patient laying in bed this morning with continued dizziness and nausea. Cardene @ 7.5 mg/hr and Esmolol at 175 mcg/kg/hr. On RA.      Follow-up For: Procedure(s) (LRB):  ASCENDING AORTA REPAIR (N/A)    Post-Operative Day: Day of Surgery    Objective:     Vital Signs (Most Recent):  Temp: 98.4 °F (36.9 °C) (06/27/24 0400)  Pulse: 75 (06/27/24 0615)  Resp: 20 (06/27/24 0615)  BP: (!) 96/52 (06/27/24 0600)  SpO2: 97 % (06/27/24 0615) Vital Signs (24h Range):  Temp:  [97.4 °F (36.3 °C)-98.8 °F (37.1 °C)] 98.4 °F (36.9 °C)  Pulse:  [64-79] 75  Resp:  [6-31] 20  SpO2:  [92 %-99 %] 97 %  BP: ()/(50-59) 96/52  Arterial Line BP: ()/(44-63) 71/50     Weight: 52.5 kg (115 lb 11.9 oz)  Body mass index is 23.38 kg/m².      Intake/Output Summary (Last 24 hours) at 6/27/2024 0717  Last data filed at 6/27/2024 0600  Gross per 24 hour   Intake 2436.66 ml   Output 1695 ml   Net 741.66 ml          Physical Exam  HENT:      Head: Normocephalic and atraumatic.   Eyes:      Extraocular Movements: Extraocular movements intact.      Conjunctiva/sclera: Conjunctivae normal.   Cardiovascular:      Rate and Rhythm: Normal rate and regular rhythm.      Comments: Right radial A-line  Pulmonary:      Effort: Pulmonary effort is normal.   Abdominal:      General: There is no distension.      Palpations: Abdomen is soft.      Tenderness: There is no abdominal tenderness.   Musculoskeletal:         General: Normal range of motion.   Skin:     General: Skin is warm and dry.   Neurological:      General: No focal deficit present.       Mental Status: She is alert and oriented to person, place, and time.      Comments: CN II-XII grossly intact  Motor and sensation intact in bilateral upper and lower extremities            Vents:       Lines/Drains/Airways       Drain  Duration                  Urethral Catheter 06/26/24 1221 16 Fr. <1 day              Arterial Line  Duration             Arterial Line 06/25/24 1500 Right Radial 1 day              Peripheral Intravenous Line  Duration                  Peripheral IV - Single Lumen 06/25/24 1130 20 G Right Antecubital 1 day         Peripheral IV - Single Lumen 06/25/24 1432 18 G Anterior;Left Forearm 1 day                    Significant Labs:    CBC/Anemia Profile:  Recent Labs   Lab 06/25/24  1134 06/26/24  0321 06/27/24  0312   WBC 9.84 12.46 9.70   HGB 13.7 11.3* 11.0*   HCT 39.9 32.6* 33.1*    353 368   MCV 88 87 89   RDW 12.4 12.3 12.6        Chemistries:  Recent Labs   Lab 06/25/24  1134 06/26/24  0321 06/27/24 0312   * 132* 134*   K 3.5 4.1 3.7   CL 96 102 107   CO2 24 21* 17*   BUN 13 16 12   CREATININE 0.5 0.7 0.7   CALCIUM 9.4 8.5* 8.0*   ALBUMIN 4.2 2.8* 2.6*   PROT 8.6* 6.9 6.2   BILITOT 0.5 0.3 0.3   ALKPHOS 73 62 62   ALT 11 8* 9*   AST 20 18 20   MG 2.1 2.0 2.1   PHOS  --  2.7 2.1*       All pertinent labs within the past 24 hours have been reviewed.    Significant Imaging:  I have reviewed all pertinent imaging results/findings within the past 24 hours.

## 2024-06-27 NOTE — ASSESSMENT & PLAN NOTE
Neuro/Psych:   - Sedation: None  - Pain:    - 650 mg Tylenol PRN, Oxycodone PRN   - Q1 neurovascular checks    - PRN Meclizine 12.5 mg for dizziness    - CTA H&N: evolving R PICA infarct and severely narrowed R vertebral artery from dissection flap.                Cardiac:   - BP Goal: MAP 60-70, HR 60-70. Impulse control  - Pressors: None  - Rhythm: NSR  - Cardene gtt and Esmolol gtt   - OR today for ascending and hemiarch replacement with Dr. Sharma    -TTE 6/26: Normal biventricular function with EF 65-70%. No signs of AR. Ascending aorta dilated to 4.1 cm.  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       Pulmonary:   - Goal SpO2 >92%  - On room air.       Renal:      Recent Labs   Lab 06/25/24  1134 06/26/24  0321 06/27/24  0312   BUN 13 16 12   CREATININE 0.5 0.7 0.7          - Voiding spontaneous; monitor I/Os    - UOP decreased to 10-20 cc/hr overnight. Given 500 Albumin with appropriate response in UOP.      - Trend BUN/Cr    FEN / GI:     - Daily CMP, PRN K/Mag/Phos per protocol     - Replace electrolytes as needed    - Nutrition:NPO for OR today    - Bowel Regimen: Will start when appropriate        ID:   - Afebrile. WBC stable  Recent Labs   Lab 06/25/24  1134 06/26/24  0321 06/27/24  0312   WBC 9.84 12.46 9.70         Heme/Onc:   - Hgb 13.7 pre-operatively  Recent Labs   Lab 06/25/24  1134 06/26/24  0321 06/27/24  0312   HGB 13.7 11.3* 11.0*    353 368       - CBC, PTT/INR daily  - DVT ppx: Will start when appropriate    Endocrine:   - CTS Goal -140  - HgbA1c: Not recorded  - SSI     PPx:   Feeding: NPO  Analgesia/Sedation: PRN pain meds  Thromboembolic Prevention: Will start when appropiate  HOB >30: Yes  Stress Ulcer: Not indicated  Glucose Control: 110-140     Lines/Drains/Airway:  Right radial arterial line  PIVs     Dispo/Code Status/Palliative:    - SICU   - Full Code

## 2024-06-27 NOTE — ASSESSMENT & PLAN NOTE
BG goal: 140-180 given age  No known hx of DM.  S/p aortic dissection repair.  On IIP.  Will monitor.    - -Continue Intensive insulin drip    - POCT Glucose every hour  - Hypoglycemia protocol in place      ** Please notify Endocrine for any change and/or advance in diet**  ** Please call Endocrine for any BG related issues **     Discharge Planning:   TBD. Please notify endocrinology prior to discharge.

## 2024-06-27 NOTE — PROGRESS NOTES
Population Health Chart Review & Patient Outreach Details      Additional [facility] Health Notes:               Updates Requested / Reviewed:      Updated Care Coordination Note, Care Everywhere, , and Immunizations Reconciliation Completed or Queried: [facility] Topics Overdue:      [facility] Score: 0     Patient is not due for any topics at this time.    Tetanus Vaccine                  Health Maintenance Topic(s) Outreach Outcomes & Actions Taken:    Breast Cancer Screening - Outreach Outcomes & Actions Taken  : External Records Uploaded & Care Team Updated if Applicable

## 2024-06-27 NOTE — NURSING
Updated KATHERYN Sanders MD of continued low UOP. New orders for 500 Albumin, will implement orders and monitor.

## 2024-06-27 NOTE — PROGRESS NOTES
Autotransfusion/Rapid Infusion Record:      06/27/2024  Autotransfusionist:  Wily Tavera    Surgeons and Role:     * Jean Paul Sharma MD - Primary     * José Miguel Torres MD - Fellow  Anesthesiologist:  Favian Fajardo MD    Past Medical History:   Diagnosis Date    History of hepatitis C, s/p successful Rx w/ SVR24 (cure) - 11/2018 2/23/2017    S/p epclusa w/ SVR    HTN (hypertension)     Hyperlipidemia     Osteoporosis        Procedure(s) (LRB):  ASCENDING AORTA REPAIR (N/A)     2:52 PM    Equipment:    Cell Saver     R.I.S.  : [employer] Model: CATSmart or CATSplus : [employer]   Model: [id number]     Serial number: [id number]   Serial number: n/a   Disposable lot #: [id number]   Disposable lot #: n/a     Were extra cardiotomies used for cell saver?  no   if yes, #:  n/a    Solutions:  Anticoagulant: ACD-A   04/25 Volume used: 750   Wash solution: 0.9% NaCl   05/26 Volume used: 2624     Cell saver checklist  Time completed:           [x]   Circuit assembled correctly     [x]   Cell saver powered and operational     [x]   Vacuum connected, functional, adjust to max -150mmHg     [x]   Anticoagulant drip rate adjusted     [x]   Transfer bag properly labeled with patient name, expiration time, volume,       anticoagulant, OR number, and initials     [x]   Cell saver disinfected after use (completed at end of case)       Cell Saver volumes:    Total volume processed:     1920 mL     Total volume pRBCs recovered     353 mL     Volume pRBCs infused     200 mL         RIS checklist   Time completed:  []   RIS circuit assembled correctly     []   RIS power and operational     []   RIS disinfected after use (completed at end of case)       RIS volumes:    Total volume infused:    (see anesthesia record for blood       product information)   N/a mL       Additional comments:

## 2024-06-27 NOTE — INTERVAL H&P NOTE
The patient has been examined and the H&P has been reviewed:    I concur with the findings and no changes have occurred since H&P was written.    Surgery risks, benefits and alternative options discussed and understood by patient/family.    Patient presented to outside hospital with subacute Type A aortic dissection. She was transferred to our facility and managed initially with impulse therapy given her stability. Pre-operative workup was completed and we will proceed to the OR for ascending and hemiarch replacement this morning. Questions were answered and informed consent obtained.         Active Hospital Problems    Diagnosis  POA    *Type 1 dissection of thoracic aorta [I71.010]  Yes    Hypertension [I10]  Yes     Chronic    Hyperlipidemia [E78.5]  Yes     Chronic    Anxiety disorder [F41.9]  Yes     Chronic      Resolved Hospital Problems   No resolved problems to display.

## 2024-06-27 NOTE — PLAN OF CARE
Mrs. Olivera returned from OR from emergent repair of Type A aortic dissection under hypothermic circulatory arrest, ascending and hemiarch replacement, and anterograde cerebral perfusion. She arrives intubated and sedated on propofol in stable condition. Inotropic and vasopressor requirements upon admission are .06 mcg/kg/min of epinephrine, .04 mcg/kg/min of norepinephrine to maintain blood pressure at a MAP 70-80 and SBP < 120. Central access includes a R IJ CVC, arterial access includes a left radial arterial line. They also have 2 mediastinal chest tubes with appropriate output and ventricular wires.     Intraoperatively, the patient received 3.7 L of crystalloid, 2u RBCs, 2u cryoprecipitate, 1u FFP, 1u Plt. Urine output intraoperatively was 1.0 L. Post-operative echo was normal BV function and mild MR. Will wean sedated to obtain proper neuro exam and goal of SBT and extubation in the morning.

## 2024-06-27 NOTE — NURSING
Notified KATHERYN Sanders MD of low UOP for previous two hours. Instructed to continue monitoring and update MD in one hour of UOP status.

## 2024-06-27 NOTE — TRANSFER OF CARE
"Anesthesia Transfer of Care Note    Patient: Tanika Olivera    Procedure(s) Performed: Procedure(s) (LRB):  ASCENDING AORTA REPAIR (N/A)    Patient location: ICU    Anesthesia Type: general    Transport from OR: Continuos invasive BP monitoring in transport. Continuous SpO2 monitoring in transport. Continuous ECG monitoring in transport    Post pain: adequate analgesia    Post assessment: no apparent anesthetic complications    Post vital signs: stable    Level of consciousness: sedated    Nausea/Vomiting: no nausea/vomiting    Complications: none    Transfer of care protocol was followed      Last vitals: Visit Vitals  BP (!) 96/52   Pulse 82   Temp 36.3 °C (97.3 °F) (Oral)   Resp (!) 22   Ht 4' 11" (1.499 m)   Wt 52.5 kg (115 lb 11.9 oz)   SpO2 100%   BMI 23.38 kg/m²     "

## 2024-06-27 NOTE — ANESTHESIA PROCEDURE NOTES
Arterial    Diagnosis: aortic dissection    Patient location during procedure: done in OR    Staffing  Authorizing Provider: Favian Fajardo MD  Performing Provider: Sumit Paula DO    Staffing  Performed by: Sumit Paula DO  Authorized by: Favian Fajardo MD    Anesthesiologist was present at the time of the procedure.    Preanesthetic Checklist  Completed: patient identified, IV checked, site marked, risks and benefits discussed, surgical consent, monitors and equipment checked, pre-op evaluation, timeout performed and anesthesia consent givenArterial  Skin Prep: chlorhexidine gluconate  Orientation: left  Location: radial    Catheter Size: 20 G  Catheter placement by Ultrasound guidance. Heme positive aspiration all ports.   Vessel Caliber: medium, patent, compressibility normal  Needle advanced into vessel with real time Ultrasound guidance.Insertion Attempts: 1  Assessment  Dressing: secured with tape and tegaderm  Patient: Tolerated well

## 2024-06-27 NOTE — ANESTHESIA PROCEDURE NOTES
Central Line    Diagnosis: aortic dissection  Patient location during procedure: done out of OR    Staffing  Authorizing Provider: Favian Fajardo MD  Performing Provider: Sumit Paula DO    Staffing  Performed by: Sumit Paula DO  Authorized by: Favian Fajardo MD    Anesthesiologist was present at the time of the procedure.  Preanesthetic Checklist  Completed: patient identified, IV checked, site marked, risks and benefits discussed, surgical consent, monitors and equipment checked, pre-op evaluation, timeout performed and anesthesia consent given  Indication   Indication: vascular access, med administration     Anesthesia   local infiltration    Central Line   Skin Prep: skin prepped with ChloraPrep, skin prep agent completely dried prior to procedure  Sterile Barriers Followed: Yes    All five maximal barriers used- gloves, gown, cap, mask, and large sterile sheet    hand hygiene performed prior to central venous catheter insertion  Location: right internal jugular.   Catheter type: triple lumen  Catheter Size: 12 Fr  Ultrasound: vascular probe with ultrasound   Vessel Caliber: medium, patent    Manometry: none  Insertion Attempts: 1   Securement:line sutured, chlorhexidine patch, sterile dressing applied and blood return through all ports    Post-Procedure    Adverse Events:none      Guidewire Guidewire removed intact.

## 2024-06-27 NOTE — ANESTHESIA POSTPROCEDURE EVALUATION
Anesthesia Post Evaluation    Patient: Tanika Olivera    Procedure(s) Performed: Procedure(s) (LRB):  ASCENDING AORTA REPAIR (N/A)    Final Anesthesia Type: general      Patient location during evaluation: ICU  Patient participation: No - Unable to Participate, Sedation  Level of consciousness: sedated  Post-procedure vital signs: reviewed and stable  Pain management: adequate  Airway patency: patent    PONV status at discharge: No PONV  Anesthetic complications: no      Cardiovascular status: blood pressure returned to baseline  Respiratory status: ETT  Hydration status: euvolemic  Follow-up not needed.              Vitals Value Taken Time   /65 06/27/24 1526   Temp 36.3 °C (97.3 °F) 06/27/24 1505   Pulse 83 06/27/24 1525   Resp 16 06/27/24 1525   SpO2 99 % 06/27/24 1525   Vitals shown include unfiled device data.      No case tracking events are documented in the log.      Pain/Halina Score: Pain Rating Prior to Med Admin: 7 (6/26/2024  8:49 PM)

## 2024-06-27 NOTE — PLAN OF CARE
SICU PLAN OF CARE    Dx: s/p repair of Type 1 dissection of thoracic aorta    Goals of Care: MAP 70-80, SBP <120    Vital Signs (last 12 hours):   Temp:  [97.3 °F (36.3 °C)]   Pulse:  [71-86]   Resp:  [18-27]   BP: (103)/(61)   SpO2:  [95 %-100 %]   Arterial Line BP: ()/(54-66)      Neuro: Follows commands , Moves all extremities spontaneously , Intubated , and Sedated    Cardiac: NSR 70s-80s    Respiratory: Ventilator; Mode: AC/VC+, 50% FiO2, 5 PEEP    Gtts: Epinephrine .04, Propofol 30, and Insulin 1.8    Urine Output: Urethral Catheter  560 mL since admit from OR    Drains: NG/OG Tube. Chest Tube, total output 90 mL total post op    Diet: NPO     Labs/Accuchecks: Lactic increase to 4.25, but has since decreased to 3.28. K 3.8 - Edith does not want to replace. Phos to be replaced. CBC, Coags, and CMP WDL.    Skin:  Midsternal Chest Incision with Hydrocolloid dsg CDI. Chest tube and groin site with island boarder dsgs CDI.  Patient turned q2h, bony prominences protected, and mattress inflated/working correctly.       Shift Events:  Admit to SICU post op - VSS.  Weaned Levo to off, low dose Epi to remain (if no HTN) per Dr Sharma. Vent weaned, ABGs improving.  Plan to stay intubated tonight and optimize for morning extubation. See flowsheet for further assessment/details.  Family updated on current condition/plan of care, questions answered, and emotional support provided.  MD updated on current condition, vitals, labs, and gtts.              Nurses Note -- 4 Eyes  6/27/2024   6:42 PM      Skin assessed during: Re-admit post op      [x] No Altered Skin Integrity Present    [x]Prevention Measures Documented      [] Yes- Altered Skin Integrity Present or Discovered   [] LDA Added if Not in Epic (Describe Wound)   [] New Altered Skin Integrity was Present on Admit and Documented in LDA   [] Wound Image Taken    Wound Care Consulted? No    Attending Nurse:  Rosa Thomas RN/Staff Member:  SCOTTIE Lanier RN,  LARISA Mehta RN

## 2024-06-27 NOTE — HPI
Reason for Consult: Management of Hyperglycemia     Surgical Procedure and Date: Emergent repair of type A aortic dissection     No known hx of DM and not on meds for DM    HPI:   Patient is a 73 y.o. female with PMH of HTN, HLD, anxiety disorder, history of hepatitis C s/p successful treatment in 2018, osteopenia who presents as a transfer from [facility] for evaluation by cardiothoracic surgery for a Type A Aortic dissection.  CT showed  Aneurysmal dilation of the visualized ascending thoracic aorta with partially thrombosed ascending aortic dissection (Constantin type A). Dissection extending into the right brachiocephalic, and proximal most right subclavian and right vertebral arteries. Continuation of the dissection into the right common carotid, carotid bulb and proximal most right internal/external carotid arteries. S/p emergent repair.  Endocrine consulted for bg management

## 2024-06-27 NOTE — PROGRESS NOTES
[facility]ensive [facility]  Critical Care - Surgery  Progress Note    Patient Name: Tanika Olivera  MRN: [id number]  Admission Date: 6/25/2024  Hospital Length of Stay: 2 days  Code Status: Full Code  Attending Provider: Jean Paul Sharma MD  Primary Care Provider: Esdras Green MD   Principal Problem: Type 1 dissection of thoracic aorta    Subjective:     Hospital/ICU Course:  No notes on file    Interval History/Significant Events: CTA performed yesterday with new findings of evolving R PICA infarct with no signs of hemorrhage and dissection flap extending into R brachiocephalic artery and severely narrowing R vertebral artery. All other findings similar to previous imaging. Dr. Sharma made decision to go to OR today. UOP decreased to 10-20 cc/hr; given 500cc albumin with adequate increase in UOP. Patient laying in bed this morning with continued dizziness and nausea. Cardene @ 7.5 mg/hr and Esmolol at 175 mcg/kg/hr. On RA.      Follow-up For: Procedure(s) (LRB):  ASCENDING AORTA REPAIR (N/A)    Post-Operative Day: Day of Surgery    Objective:     Vital Signs (Most Recent):  Temp: 98.4 °F (36.9 °C) (06/27/24 0400)  Pulse: 75 (06/27/24 0615)  Resp: 20 (06/27/24 0615)  BP: (!) 96/52 (06/27/24 0600)  SpO2: 97 % (06/27/24 0615) Vital Signs (24h Range):  Temp:  [97.4 °F (36.3 °C)-98.8 °F (37.1 °C)] 98.4 °F (36.9 °C)  Pulse:  [64-79] 75  Resp:  [6-31] 20  SpO2:  [92 %-99 %] 97 %  BP: ()/(50-59) 96/52  Arterial Line BP: ()/(44-63) 71/50     Weight: 52.5 kg (115 lb 11.9 oz)  Body mass index is 23.38 kg/m².      Intake/Output Summary (Last 24 hours) at 6/27/2024 0717  Last data filed at 6/27/2024 0600  Gross per 24 hour   Intake 2436.66 ml   Output 1695 ml   Net 741.66 ml          Physical Exam  HENT:      Head: Normocephalic and atraumatic.   Eyes:      Extraocular Movements: Extraocular movements intact.      Conjunctiva/sclera: Conjunctivae normal.   Cardiovascular:      Rate and Rhythm: Normal rate  and regular rhythm.      Comments: Right radial A-line  Pulmonary:      Effort: Pulmonary effort is normal.   Abdominal:      General: There is no distension.      Palpations: Abdomen is soft.      Tenderness: There is no abdominal tenderness.   Musculoskeletal:         General: Normal range of motion.   Skin:     General: Skin is warm and dry.   Neurological:      General: No focal deficit present.      Mental Status: She is alert and oriented to person, place, and time.      Comments: CN II-XII grossly intact  Motor and sensation intact in bilateral upper and lower extremities            Vents:       Lines/Drains/Airways       Drain  Duration                  Urethral Catheter 06/26/24 1221 16 Fr. <1 day              Arterial Line  Duration             Arterial Line 06/25/24 1500 Right Radial 1 day              Peripheral Intravenous Line  Duration                  Peripheral IV - Single Lumen 06/25/24 1130 20 G Right Antecubital 1 day         Peripheral IV - Single Lumen 06/25/24 1432 18 G Anterior;Left Forearm 1 day                    Significant Labs:    CBC/Anemia Profile:  Recent Labs   Lab 06/25/24  1134 06/26/24  0321 06/27/24  0312   WBC 9.84 12.46 9.70   HGB 13.7 11.3* 11.0*   HCT 39.9 32.6* 33.1*    353 368   MCV 88 87 89   RDW 12.4 12.3 12.6        Chemistries:  Recent Labs   Lab 06/25/24  1134 06/26/24  0321 06/27/24  0312   * 132* 134*   K 3.5 4.1 3.7   CL 96 102 107   CO2 24 21* 17*   BUN 13 16 12   CREATININE 0.5 0.7 0.7   CALCIUM 9.4 8.5* 8.0*   ALBUMIN 4.2 2.8* 2.6*   PROT 8.6* 6.9 6.2   BILITOT 0.5 0.3 0.3   ALKPHOS 73 62 62   ALT 11 8* 9*   AST 20 18 20   MG 2.1 2.0 2.1   PHOS  --  2.7 2.1*       All pertinent labs within the past 24 hours have been reviewed.    Significant Imaging:  I have reviewed all pertinent imaging results/findings within the past 24 hours.  Assessment/Plan:     Cardiac/Vascular  * Type 1 dissection of thoracic aorta  Neuro/Psych:   - Sedation: None  - Pain:     - 650 mg Tylenol PRN, Oxycodone PRN   - Q1 neurovascular checks    - PRN Meclizine 12.5 mg for dizziness    - CTA H&N: evolving R PICA infarct and severely narrowed R vertebral artery from dissection flap.                Cardiac:   - BP Goal: MAP 60-70, HR 60-70. Impulse control  - Pressors: None  - Rhythm: NSR  - Cardene gtt and Esmolol gtt   - OR today for ascending and hemiarch replacement with Dr. Sharma    -TTE 6/26: Normal biventricular function with EF 65-70%. No signs of AR. Ascending aorta dilated to 4.1 cm.  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       Pulmonary:   - Goal SpO2 >92%  - On room air.       Renal:      Recent Labs   Lab 06/25/24  1134 06/26/24  0321 06/27/24  0312   BUN 13 16 12   CREATININE 0.5 0.7 0.7          - Voiding spontaneous; monitor I/Os    - UOP decreased to 10-20 cc/hr overnight. Given 500 Albumin with appropriate response in UOP.      - Trend BUN/Cr    FEN / GI:     - Daily CMP, PRN K/Mag/Phos per protocol     - Replace electrolytes as needed    - Nutrition:NPO for OR today    - Bowel Regimen: Will start when appropriate        ID:   - Afebrile. WBC stable  Recent Labs   Lab 06/25/24  1134 06/26/24  0321 06/27/24  0312   WBC 9.84 12.46 9.70         Heme/Onc:   - Hgb 13.7 pre-operatively  Recent Labs   Lab 06/25/24  1134 06/26/24  0321 06/27/24  0312   HGB 13.7 11.3* 11.0*    353 368       - CBC, PTT/INR daily  - DVT ppx: Will start when appropriate    Endocrine:   - CTS Goal -140  - HgbA1c: Not recorded  - SSI     PPx:   Feeding: NPO  Analgesia/Sedation: PRN pain meds  Thromboembolic Prevention: Will start when appropiate  HOB >30: Yes  Stress Ulcer: Not indicated  Glucose Control: 110-140     Lines/Drains/Airway:  Right radial arterial line  PIVs     Dispo/Code Status/Palliative:    - SICU   - Full Code          Critical care was time spent personally by me on the following  activities: development of treatment plan with patient or surrogate and bedside caregivers, discussions with consultants, evaluation of patient's response to treatment, examination of patient, ordering and performing treatments and interventions, ordering and review of laboratory studies, ordering and review of radiographic studies, pulse oximetry, re-evaluation of patient's condition.  This critical care time did not overlap with that of any other provider or involve time for any procedures.     Florian Horta,   Critical Care - Surgery  [facility]

## 2024-06-27 NOTE — OP NOTE
DATE OF PROCEDURE:  6/27/2024     PREOPERATIVE DIAGNOSES:  1. Acute type A aortic dissection.  2. Uncontrolled hypertension.  3. Anxiety Disorder  4. Atrophic Vaginitis  5. Osteopenia  6. Severe Dizziness  7. Nausea-unknown etiology     POSTOPERATIVE DIAGNOSES: Same       OPERATIONS:  1. Emergent repair of type A aortic dissection under hypothermic circulatory arrest.  2. Replacement of ascending and hemiarch using a 28 size Gelweave graft.  3. Anetrograde cerebral perfusion.  5. Initiation of cardiopulmonary bypass through the right femoral artery.  6. Repair of right femoral artery.     STAFF SURGEON:  Jean Paul Sharma M.D.     FIRST ASSISTANT:  Shantel Torres      SECOND ASSISTANT:  Aure Fitch     ANESTHESIA:  GETA.     ESTIMATED BLOOD LOSS:  300 mL     KEY FINDINGS OF THE OPERATION:  1. Large ascending aortic aneurysm with chronic dissection with an acute component  2. Dissection flaps, originating right between the takeoff of the left carotid and the right innominate artery  3. Right above the sinotubular junction the ascending aorta was completely resected and all the hematoma was removed.  Aortic valve was found to be completely intact with no leak.        INDICATION OF OPERATION:  This is a 73-year-old female who was transferred from an outside institution for chronic type a aortic dissection with an acute component.  Patient was suffering from dizziness and nausea.  Blood pressure control was initiated and after extensive discussion with the family patient wanted proceed with surgical repair.  Patient understood the risks, benefits and treatment options including the effects of hypothermic circulatory arrest not limited to stroke, neurological dysfunction, renal failure, progression of dizziness and nausea including death.  An informed consent was obtained.  Patient was accompanied by the spouse who also understood all the risks and benefits and agreed with the treatment plan.     OPERATION:    The patient was  brought to the Operating Room and placed in a supine position.  After induction of anesthesia, area was prepped and draped in the usual sterile fashion.  A right groin incision was made, which was carried down to the femoral artery.  Femoral artery was found to be free of any dissection.  Pursestring sutures were placed.  Once that was done, a midline incision was made on the sternum.  It was carried all the way down to the sternum.  Median sternotomy was then performed.  Sternal edges were cauterized.  Chest retractor was placed.  Pericardium was then opened up.  No hemorrhage was noted in the pericardium.  However, an extensively large ascending aortic aneurysm was noted.  No touch technique was utilized.  Systemic heparinization was done.  ACT greater than 450 was obtained.  Cannulation stitches in the right atrial appendage was placed.  The arterial cannulation in the right femoral artery was already done before.  Cutdown of the right femoral artery was carried out and exposure of the right femoral artery was done.  Using Seldinger technique, an 18 Welsh cannula was introduced into the right femoral artery.  On NIK examination, the guidewire was noted in the true lumen.  The femoral artery cannula was inserted and secured and connected to cardiopulmonary bypass.  The venous cannula was placed in the right atrial appendage and the patient was placed on full cardiopulmonary bypass.  CO2 was used to flood the operative field.  Active cooling was initiated.  A LV vent was placed through the right superior pulmonary vein.  The patient started fibrillating once a temperature of 28 degrees Celsius was noted; however, the heart remained completely empty.  At 25 degrees, 40 mEq of potassium was placed in the cardiopulmonary bypass to obtain a cardiac standstill and circulatory arrest was initiated.  The aorta was opened.  The aneurysmal dissected ascending section of the aorta from the STJ upwards all to the region of  the innominate artery was all involved in a hematoma and dissection was resected and removed.  The aneurysm had completely stuck to the surrounding structures and significant time was spent freeing it from those structures.  The dissection flap was noted to spare the arch vessels.  A portion of felt was used to obliterate the dissection flap in the arch and using a 4-0 Prolene stitch, a continuous running anastomosis was performed to sandwich the felt layer.  Once that was done, the size of the aorta was measured and was found to be a good match for a 28 graft.  The 28 Gelweave graft was brought to the field and soaked in water and the anastomosis was initiated using a 4-0 Prolene stitch.  Before doing that, antegrade cerebral perfusion was initiated by placing the retrograde cardioplegic catheter into the innominate artery directed into the right carotid.  Efflux from the left carotid was noted and the anastomosis was initiated.  Once the anastomosis was completed, the graft was trimmed to appropriate length and arterial cannula was placed through the side arm of this Gelweave graft to initiate cardiopulmonary bypass.. However, before initiating the cardiopulmonary bypass through the graft, cardiopulmonary bypass was reinitiated through the right femoral artery to de-air the system.  Efflux of all the debris and air was done.  Once that was done, clamp was placed on the ascending aortic graft and clamp was placed on the arterial cannula that was inserted through side arm of this graft.  Finally, the patient was disconnected from cardiopulmonary bypass from the femoral artery and reconnected to the ascending aorta and active rewarming was initiated.  Once that was done, the cannula in he right femoral artery was removed and primary repair of the femoral artery was carried out.  Once that was achieved, attention was directed towards the aortic root.  All the excessive aneurysmal and dissected tissue was removed right  all the way down to the sinotubular junction.  No dissection flap was noted into the sinuses or extending into the coronary artery.  Every 20 minutes,handheld cardioplegia catheter was used to direct cardioplegia through the right and the left coronary artery.  Using a 4-0 Prolene stitch, a continuous running proximal aortic anastomosis was performed.   Once the anastomosis was completely done, root vent was placed on the ascending aorta and the clamp was removed.  At that time, the patient's temperature was 32 degrees and patient was continued to be warmed till 36.5° C.   Root vent was being used in the newly constructed ascending aorta for de-airing purposes.  Once that was achieved, gradually ventilation was initiated and the patient was then weaned off from cardiopulmonary bypass.  The patient  from cardiopulmonary bypass without any problems.  No wall motion abnormality was noted.  No  aortic insufficiency was noted.    The patient  from cardiopulmonary bypass without any concerns.  Test dose of protamine was given followed by full dose of protamine to reverse the effects of systemic heparin.  Midway dose, all the various catheters and cannulas were removed.  Various blood products were given to achieve good hemostasis.  Once good hemostasis was ensured, two drainage tubes were placed and brought through separate skin incision.  Sternum was then approximated with #6 stainless steel wires after closing the pericardium over the newly constructed ascending aorta and hemiarch.    Skin and subcutaneous tissues were closed in multiple layers.  Sterile dressing was applied.  The right femoral cutdown was then primarily repaired and closed in multiple layers and a sterile dressing applied.  The patient was taken back to the Intensive Care Unit in a stable condition.  Good cardiac function and excellent urine output was noted at the end of the case.    Medicare Attestation:  I was present for all parts  of the operation and Dr. Torres acted as my 1st assistant.

## 2024-06-27 NOTE — SUBJECTIVE & OBJECTIVE
Interval HPI:   S/p emergent aortic dissection repair. Patient in room 29977 CVICU/73026 CVICU A. Blood glucose stable. BG at and above goal on current insulin regimen (IIP). Steroid use- None .   Day of Surgery  Renal function- Normal   Vasopressors-  Epinephrine  and Norepinephrine     Diet NPO Except for: Sips with Medication    Eating:   NPO  Nausea: No  Hypoglycemia and intervention: No  Fever: No    PMH, PSH, FH, SH updated and reviewed     ROS:    Review of Systems   Unable to perform ROS: Intubated       Current Medications and/or Treatments Impacting Glycemic Control  Immunotherapy:    Immunosuppressants       None          Steroids:   Hormones (From admission, onward)      None          Pressors:    Autonomic Drugs (From admission, onward)      None          Hyperglycemia/Diabetes Medications:   Antihyperglycemics (From admission, onward)      Start     Stop Route Frequency Ordered    06/27/24 1600  insulin regular in 0.9 % NaCl 100 unit/100 mL (1 unit/mL) infusion        Question Answer Comment   Insulin rate changes (DO NOT MODIFY ANSWER) \\[email or web address]\epic\Images\Pharmacy\InsulinInfusions\CTS INSULIN [id number].pdf    Enter initial dose (Units/hr): 1        -- IV Continuous 06/27/24 1500             PHYSICAL EXAMINATION:  Vitals:    06/27/24 1505   BP:    Pulse: 77   Resp: 18   Temp:      Body mass index is 23.38 kg/m².     Physical Exam  Constitutional:       Appearance: She is ill-appearing.   HENT:      Head: Normocephalic and atraumatic.   Pulmonary:      Comments: Intubated  Neurological:      Comments: Sedated

## 2024-06-27 NOTE — CONSULTS
[facility]  Endocrinology  Diabetes Consult Note    Consult Requested by: Jena Paul Sharma MD   Reason for admit: Type 1 dissection of thoracic aorta    HISTORY OF PRESENT ILLNESS:  Reason for Consult: Management of Hyperglycemia     Surgical Procedure and Date: Emergent repair of type A aortic dissection     No known hx of DM and not on meds for DM    HPI:   Patient is a 73 y.o. female with PMH of HTN, HLD, anxiety disorder, history of hepatitis C s/p successful treatment in 2018, osteopenia who presents as a transfer from [facility] for evaluation by cardiothoracic surgery for a Type A Aortic dissection.  CT showed  Aneurysmal dilation of the visualized ascending thoracic aorta with partially thrombosed ascending aortic dissection (Kenneth type A). Dissection extending into the right brachiocephalic, and proximal most right subclavian and right vertebral arteries. Continuation of the dissection into the right common carotid, carotid bulb and proximal most right internal/external carotid arteries. S/p emergent repair.  Endocrine consulted for bg management      Interval HPI:   S/p emergent aortic dissection repair. Patient in room 44904 CVICU/73082 CVICU A. Blood glucose stable. BG at and above goal on current insulin regimen (IIP). Steroid use- None .   Day of Surgery  Renal function- Normal   Vasopressors-  Epinephrine  and Norepinephrine     Diet NPO Except for: Sips with Medication    Eating:   NPO  Nausea: No  Hypoglycemia and intervention: No  Fever: No    PMH, PSH, FH, SH updated and reviewed     ROS:    Review of Systems   Unable to perform ROS: Intubated       Current Medications and/or Treatments Impacting Glycemic Control  Immunotherapy:    Immunosuppressants       None          Steroids:   Hormones (From admission, onward)      None          Pressors:    Autonomic Drugs (From admission, onward)      None          Hyperglycemia/Diabetes Medications:  "  Antihyperglycemics (From admission, onward)      Start     Stop Route Frequency Ordered    06/27/24 1600  insulin regular in 0.9 % NaCl 100 unit/100 mL (1 unit/mL) infusion        Question Answer Comment   Insulin rate changes (DO NOT MODIFY ANSWER) [email or web address]\Images\[email or web address]\InsulinInfusions\CTS INSULIN [id number]    Enter initial dose (Units/hr): 1        -- IV Continuous 06/27/24 1500             PHYSICAL EXAMINATION:  Vitals:    06/27/24 1505   BP:    Pulse: 77   Resp: 18   Temp:      Body mass index is 23.38 kg/m².     Physical Exam  Constitutional:       Appearance: She is ill-appearing.   HENT:      Head: Normocephalic and atraumatic.   Pulmonary:      Comments: Intubated  Neurological:      Comments: Sedated            Labs Reviewed and Include   Recent Labs   Lab 06/27/24  0312   *   CALCIUM 8.0*   ALBUMIN 2.6*   PROT 6.2   *   K 3.7   CO2 17*      BUN 12   CREATININE 0.7   ALKPHOS 62   ALT 9*   AST 20   BILITOT 0.3     Lab Results   Component Value Date    WBC 9.70 06/27/2024    HGB 11.0 (L) 06/27/2024    HCT 17 (LL) 06/27/2024    MCV 89 06/27/2024     06/27/2024     No results for input(s): "TSH", "FREET4" in the last 168 hours.  No results found for: "HGBA1C"    Nutritional status:   Body mass index is 23.38 kg/m².  Lab Results   Component Value Date    ALBUMIN 2.6 (L) 06/27/2024    ALBUMIN 2.8 (L) 06/26/2024    ALBUMIN 4.2 06/25/2024     No results found for: "PREALBUMIN"    Estimated Creatinine Clearance: 56.8 mL/min (based on SCr of 0.7 mg/dL).    Accu-Checks  Recent Labs     06/25/24  1902   POCTGLUCOSE 162*        ASSESSMENT and PLAN    Cardiac/Vascular  * Type 1 dissection of thoracic aorta  Managed per primary team  Optimize bg control        Hyperlipidemia    On statin  Managed by primary    Endocrine  Stress hyperglycemia  BG goal: 140-180 given age  No known hx of DM.  S/p aortic dissection repair.  On IIP.  Will monitor.    - -Continue Intensive insulin drip "    - POCT Glucose every hour  - Hypoglycemia protocol in place      ** Please notify Endocrine for any change and/or advance in diet**  ** Please call Endocrine for any BG related issues **     Discharge Planning:   TBD. Please notify endocrinology prior to discharge.            Plan discussed with patient, family, and RN at bedside.     Art Hester PA-C  Endocrinology  [facility]

## 2024-06-27 NOTE — ANESTHESIA PROCEDURE NOTES
NIK    Diagnosis: aortic dissection  Patient location during procedure: OR  Exam type: Baseline    Staffing  Performed: anesthesiologist     Anesthesiologist: Favian Fajardo MD        Anesthesiologist Present  Yes      Setup & Induction  Patient preparation: bite block inserted  Exam: completeDoppler Echo: 2D, pulse wave Doppler and color flow mapping.  Exam     Left Heart  Left Atruim: normal and normal (men 3.0-4.0; women 2.7-3.8)    Left Ventricle: cm, normal (men 4.2-5.9; women 3.8-5.2)  LV Wall Thickness (posterior wall):cm, normal (men 0.6-1.0 cm; women 0.6-0.9 cm)    LVAD:no  Estimated Ejection Fraction: > 55% normal            Right Heart  Right Ventricle: dilated  Right Ventricle Function: normal  Right Atria:  normal    Intra Atrial Septum  PFO: no shunt by color flow doppler  no IAS aneurysm  no lipomatous hypertrophy  no Atrial Septal Defect (Asd)    Right Ventricle  Size: dilated, Free Wall Thickness: normal    Aortic Valve:  Stenosis: none.  Morphology: trileaflet    Regurgitation: no aortic valve regurgitation      Mitral Valve:   Morphology:normal  Jet Description: mild-to-moderate    Tricuspid Valve:  Morphology: normal  Regurgitation: moderate    Pulmonic Valve:  Morphology:normal  Regurgitation(color flow): none    Great Vessels  Diameter (cm): dissection/mural thrombus visualized in ascending.   Ascending Aorta Atherosclerosis: 2=mild dz (<3mm)  Aortic Arch Atherosclerosis: 2=mild dz (<3mm)  IABP: no  Descending Aorta Atherosclerosis: 2=mild dz (<3mm)  Aorta    Descending aorta IABP: no    Effusions left pleural    SummaryFindings discussed with surgeon.    Other Findings   Post bypass  BiV Fxn NL  Mod MR  No AI

## 2024-06-28 LAB
ALBUMIN SERPL BCP-MCNC: 2.8 G/DL (ref 3.5–5.2)
ALLENS TEST: ABNORMAL
ALLENS TEST: NORMAL
ALLENS TEST: NORMAL
ALP SERPL-CCNC: 32 U/L (ref 55–135)
ALT SERPL W/O P-5'-P-CCNC: 14 U/L (ref 10–44)
ANION GAP SERPL CALC-SCNC: 8 MMOL/L (ref 8–16)
APTT PPP: 21.7 SEC (ref 21–32)
AST SERPL-CCNC: 47 U/L (ref 10–40)
BILIRUB SERPL-MCNC: 0.4 MG/DL (ref 0.1–1)
BLD PROD TYP BPU: NORMAL
BLOOD UNIT EXPIRATION DATE: NORMAL
BLOOD UNIT TYPE CODE: 6200
BLOOD UNIT TYPE: NORMAL
BUN SERPL-MCNC: 10 MG/DL (ref 8–23)
CA-I BLDV-SCNC: 1.11 MMOL/L (ref 1.06–1.42)
CALCIUM SERPL-MCNC: 8.4 MG/DL (ref 8.7–10.5)
CHLORIDE SERPL-SCNC: 110 MMOL/L (ref 95–110)
CO2 SERPL-SCNC: 24 MMOL/L (ref 23–29)
CODING SYSTEM: NORMAL
CREAT SERPL-MCNC: 0.7 MG/DL (ref 0.5–1.4)
CROSSMATCH INTERPRETATION: NORMAL
DELSYS: ABNORMAL
DELSYS: ABNORMAL
DELSYS: NORMAL
DELSYS: NORMAL
DISPENSE STATUS: NORMAL
ERYTHROCYTE [DISTWIDTH] IN BLOOD BY AUTOMATED COUNT: 13.3 % (ref 11.5–14.5)
ERYTHROCYTE [SEDIMENTATION RATE] IN BLOOD BY WESTERGREN METHOD: 18 MM/H
ERYTHROCYTE [SEDIMENTATION RATE] IN BLOOD BY WESTERGREN METHOD: 18 MM/H
EST. GFR  (NO RACE VARIABLE): >60 ML/MIN/1.73 M^2
FIO2: 40
FIO2: 40
FLOW: 5
FLOW: 5
GLUCOSE SERPL-MCNC: 119 MG/DL (ref 70–110)
HCO3 UR-SCNC: 22.6 MMOL/L (ref 24–28)
HCO3 UR-SCNC: 25 MMOL/L (ref 24–28)
HCO3 UR-SCNC: 25.3 MMOL/L (ref 24–28)
HCO3 UR-SCNC: 25.4 MMOL/L (ref 24–28)
HCO3 UR-SCNC: 25.7 MMOL/L (ref 24–28)
HCT VFR BLD AUTO: 19.5 % (ref 37–48.5)
HCT VFR BLD CALC: 18 %PCV (ref 36–54)
HCT VFR BLD CALC: 19 %PCV (ref 36–54)
HCT VFR BLD CALC: 25 %PCV (ref 36–54)
HCT VFR BLD CALC: 25 %PCV (ref 36–54)
HCT VFR BLD CALC: 26 %PCV (ref 36–54)
HGB BLD-MCNC: 6.9 G/DL (ref 12–16)
INR PPP: 1.1 (ref 0.8–1.2)
LDH SERPL L TO P-CCNC: 0.73 MMOL/L (ref 0.36–1.25)
LDH SERPL L TO P-CCNC: 0.96 MMOL/L (ref 0.36–1.25)
LDH SERPL L TO P-CCNC: 1.45 MMOL/L (ref 0.36–1.25)
LDH SERPL L TO P-CCNC: 1.61 MMOL/L (ref 0.36–1.25)
LDH SERPL L TO P-CCNC: 1.86 MMOL/L (ref 0.36–1.25)
MAGNESIUM SERPL-MCNC: 2.3 MG/DL (ref 1.6–2.6)
MAP: 6.8
MAP: 6.8
MCH RBC QN AUTO: 31.4 PG (ref 27–31)
MCHC RBC AUTO-ENTMCNC: 35.4 G/DL (ref 32–36)
MCV RBC AUTO: 89 FL (ref 82–98)
MIN VOL: 6.36
MIN VOL: 6.36
MODE: ABNORMAL
MODE: ABNORMAL
MODE: NORMAL
MODE: NORMAL
NUM UNITS TRANS FFP: NORMAL
PCO2 BLDA: 32.9 MMHG (ref 35–45)
PCO2 BLDA: 35.1 MMHG (ref 35–45)
PCO2 BLDA: 35.2 MMHG (ref 35–45)
PCO2 BLDA: 35.8 MMHG (ref 35–45)
PCO2 BLDA: 39.5 MMHG (ref 35–45)
PEEP: 5
PEEP: 5
PH SMN: 7.41 [PH] (ref 7.35–7.45)
PH SMN: 7.42 [PH] (ref 7.35–7.45)
PH SMN: 7.46 [PH] (ref 7.35–7.45)
PH SMN: 7.47 [PH] (ref 7.35–7.45)
PH SMN: 7.49 [PH] (ref 7.35–7.45)
PHOSPHATE SERPL-MCNC: 3.1 MG/DL (ref 2.7–4.5)
PLATELET # BLD AUTO: 177 K/UL (ref 150–450)
PMV BLD AUTO: 9.5 FL (ref 9.2–12.9)
PO2 BLDA: 106 MMHG (ref 80–100)
PO2 BLDA: 117 MMHG (ref 80–100)
PO2 BLDA: 135 MMHG (ref 80–100)
PO2 BLDA: 74 MMHG (ref 80–100)
PO2 BLDA: 80 MMHG (ref 80–100)
POC BE: -2 MMOL/L
POC BE: 1 MMOL/L
POC BE: 2 MMOL/L
POC IONIZED CALCIUM: 1.07 MMOL/L (ref 1.06–1.42)
POC IONIZED CALCIUM: 1.18 MMOL/L (ref 1.06–1.42)
POC IONIZED CALCIUM: 1.19 MMOL/L (ref 1.06–1.42)
POC IONIZED CALCIUM: 1.2 MMOL/L (ref 1.06–1.42)
POC IONIZED CALCIUM: 1.23 MMOL/L (ref 1.06–1.42)
POC SATURATED O2: 95 % (ref 95–100)
POC SATURATED O2: 97 % (ref 95–100)
POC SATURATED O2: 98 % (ref 95–100)
POC SATURATED O2: 99 % (ref 95–100)
POC SATURATED O2: 99 % (ref 95–100)
POC TCO2: 24 MMOL/L (ref 23–27)
POC TCO2: 26 MMOL/L (ref 23–27)
POC TCO2: 27 MMOL/L (ref 23–27)
POCT GLUCOSE: 102 MG/DL (ref 70–110)
POCT GLUCOSE: 103 MG/DL (ref 70–110)
POCT GLUCOSE: 106 MG/DL (ref 70–110)
POCT GLUCOSE: 107 MG/DL (ref 70–110)
POCT GLUCOSE: 112 MG/DL (ref 70–110)
POCT GLUCOSE: 115 MG/DL (ref 70–110)
POCT GLUCOSE: 116 MG/DL (ref 70–110)
POCT GLUCOSE: 120 MG/DL (ref 70–110)
POCT GLUCOSE: 131 MG/DL (ref 70–110)
POCT GLUCOSE: 147 MG/DL (ref 70–110)
POCT GLUCOSE: 147 MG/DL (ref 70–110)
POCT GLUCOSE: 149 MG/DL (ref 70–110)
POCT GLUCOSE: 95 MG/DL (ref 70–110)
POCT GLUCOSE: 97 MG/DL (ref 70–110)
POCT GLUCOSE: 99 MG/DL (ref 70–110)
POTASSIUM BLD-SCNC: 3.2 MMOL/L (ref 3.5–5.1)
POTASSIUM BLD-SCNC: 3.3 MMOL/L (ref 3.5–5.1)
POTASSIUM BLD-SCNC: 3.5 MMOL/L (ref 3.5–5.1)
POTASSIUM BLD-SCNC: 3.6 MMOL/L (ref 3.5–5.1)
POTASSIUM BLD-SCNC: 3.7 MMOL/L (ref 3.5–5.1)
POTASSIUM SERPL-SCNC: 3.7 MMOL/L (ref 3.5–5.1)
PROT SERPL-MCNC: 4.6 G/DL (ref 6–8.4)
PROTHROMBIN TIME: 11.6 SEC (ref 9–12.5)
PS: 5
PS: 5
RBC # BLD AUTO: 2.2 M/UL (ref 4–5.4)
SAMPLE: ABNORMAL
SAMPLE: NORMAL
SAMPLE: NORMAL
SITE: ABNORMAL
SITE: NORMAL
SITE: NORMAL
SODIUM BLD-SCNC: 140 MMOL/L (ref 136–145)
SODIUM BLD-SCNC: 142 MMOL/L (ref 136–145)
SODIUM BLD-SCNC: 144 MMOL/L (ref 136–145)
SODIUM SERPL-SCNC: 142 MMOL/L (ref 136–145)
SP02: 95
SP02: 95
SP02: 97
SP02: 97
SPONT RATE: 18
SPONT RATE: 18
VOL: 421
VOL: 421
WBC # BLD AUTO: 9.38 K/UL (ref 3.9–12.7)

## 2024-06-28 PROCEDURE — 99233 SBSQ HOSP IP/OBS HIGH 50: CPT | Mod: ,,, | Performed by: PHYSICIAN ASSISTANT

## 2024-06-28 PROCEDURE — 85027 COMPLETE CBC AUTOMATED: CPT | Performed by: STUDENT IN AN ORGANIZED HEALTH CARE EDUCATION/TRAINING PROGRAM

## 2024-06-28 PROCEDURE — 82800 BLOOD PH: CPT

## 2024-06-28 PROCEDURE — 94010 BREATHING CAPACITY TEST: CPT

## 2024-06-28 PROCEDURE — 85730 THROMBOPLASTIN TIME PARTIAL: CPT | Performed by: STUDENT IN AN ORGANIZED HEALTH CARE EDUCATION/TRAINING PROGRAM

## 2024-06-28 PROCEDURE — 94761 N-INVAS EAR/PLS OXIMETRY MLT: CPT | Mod: XB

## 2024-06-28 PROCEDURE — 97161 PT EVAL LOW COMPLEX 20 MIN: CPT

## 2024-06-28 PROCEDURE — 25000003 PHARM REV CODE 250

## 2024-06-28 PROCEDURE — P9021 RED BLOOD CELLS UNIT: HCPCS

## 2024-06-28 PROCEDURE — 99900017 HC EXTUBATION W/PARAMETERS (STAT)

## 2024-06-28 PROCEDURE — 82330 ASSAY OF CALCIUM: CPT

## 2024-06-28 PROCEDURE — 25000003 PHARM REV CODE 250: Performed by: STUDENT IN AN ORGANIZED HEALTH CARE EDUCATION/TRAINING PROGRAM

## 2024-06-28 PROCEDURE — 85610 PROTHROMBIN TIME: CPT | Performed by: STUDENT IN AN ORGANIZED HEALTH CARE EDUCATION/TRAINING PROGRAM

## 2024-06-28 PROCEDURE — 63600175 PHARM REV CODE 636 W HCPCS

## 2024-06-28 PROCEDURE — 94150 VITAL CAPACITY TEST: CPT

## 2024-06-28 PROCEDURE — 99900035 HC TECH TIME PER 15 MIN (STAT)

## 2024-06-28 PROCEDURE — 84100 ASSAY OF PHOSPHORUS: CPT | Performed by: THORACIC SURGERY (CARDIOTHORACIC VASCULAR SURGERY)

## 2024-06-28 PROCEDURE — 83735 ASSAY OF MAGNESIUM: CPT | Performed by: THORACIC SURGERY (CARDIOTHORACIC VASCULAR SURGERY)

## 2024-06-28 PROCEDURE — 25000003 PHARM REV CODE 250: Performed by: ANESTHESIOLOGY

## 2024-06-28 PROCEDURE — 63600175 PHARM REV CODE 636 W HCPCS: Performed by: THORACIC SURGERY (CARDIOTHORACIC VASCULAR SURGERY)

## 2024-06-28 PROCEDURE — 94003 VENT MGMT INPAT SUBQ DAY: CPT

## 2024-06-28 PROCEDURE — 84132 ASSAY OF SERUM POTASSIUM: CPT

## 2024-06-28 PROCEDURE — 97165 OT EVAL LOW COMPLEX 30 MIN: CPT

## 2024-06-28 PROCEDURE — 84295 ASSAY OF SERUM SODIUM: CPT

## 2024-06-28 PROCEDURE — 85014 HEMATOCRIT: CPT

## 2024-06-28 PROCEDURE — 27100171 HC OXYGEN HIGH FLOW UP TO 24 HOURS

## 2024-06-28 PROCEDURE — 63600175 PHARM REV CODE 636 W HCPCS: Performed by: STUDENT IN AN ORGANIZED HEALTH CARE EDUCATION/TRAINING PROGRAM

## 2024-06-28 PROCEDURE — 97535 SELF CARE MNGMENT TRAINING: CPT

## 2024-06-28 PROCEDURE — 20000000 HC ICU ROOM

## 2024-06-28 PROCEDURE — 82330 ASSAY OF CALCIUM: CPT | Performed by: THORACIC SURGERY (CARDIOTHORACIC VASCULAR SURGERY)

## 2024-06-28 PROCEDURE — 99291 CRITICAL CARE FIRST HOUR: CPT | Mod: GC,,, | Performed by: ANESTHESIOLOGY

## 2024-06-28 PROCEDURE — 82803 BLOOD GASES ANY COMBINATION: CPT

## 2024-06-28 PROCEDURE — 97530 THERAPEUTIC ACTIVITIES: CPT

## 2024-06-28 PROCEDURE — 83605 ASSAY OF LACTIC ACID: CPT

## 2024-06-28 PROCEDURE — 37799 UNLISTED PX VASCULAR SURGERY: CPT

## 2024-06-28 PROCEDURE — 80053 COMPREHEN METABOLIC PANEL: CPT | Performed by: STUDENT IN AN ORGANIZED HEALTH CARE EDUCATION/TRAINING PROGRAM

## 2024-06-28 RX ORDER — NICARDIPINE HYDROCHLORIDE 0.2 MG/ML
0-15 INJECTION INTRAVENOUS CONTINUOUS
Status: DISCONTINUED | OUTPATIENT
Start: 2024-06-28 | End: 2024-06-28

## 2024-06-28 RX ORDER — IBUPROFEN 200 MG
16 TABLET ORAL
Status: DISCONTINUED | OUTPATIENT
Start: 2024-06-28 | End: 2024-07-01

## 2024-06-28 RX ORDER — GLUCAGON 1 MG
1 KIT INJECTION
Status: DISCONTINUED | OUTPATIENT
Start: 2024-06-28 | End: 2024-07-01

## 2024-06-28 RX ORDER — DEXMEDETOMIDINE HYDROCHLORIDE 4 UG/ML
0-1.4 INJECTION, SOLUTION INTRAVENOUS CONTINUOUS
Status: DISCONTINUED | OUTPATIENT
Start: 2024-06-28 | End: 2024-06-28

## 2024-06-28 RX ORDER — IBUPROFEN 200 MG
24 TABLET ORAL
Status: DISCONTINUED | OUTPATIENT
Start: 2024-06-28 | End: 2024-07-01

## 2024-06-28 RX ORDER — FAMOTIDINE 20 MG/1
20 TABLET, FILM COATED ORAL 2 TIMES DAILY
Status: DISCONTINUED | OUTPATIENT
Start: 2024-06-28 | End: 2024-07-02 | Stop reason: HOSPADM

## 2024-06-28 RX ORDER — INSULIN ASPART 100 [IU]/ML
0-5 INJECTION, SOLUTION INTRAVENOUS; SUBCUTANEOUS EVERY 4 HOURS PRN
Status: DISCONTINUED | OUTPATIENT
Start: 2024-06-28 | End: 2024-06-29

## 2024-06-28 RX ORDER — METOPROLOL TARTRATE 25 MG/1
25 TABLET, FILM COATED ORAL 2 TIMES DAILY
Status: DISCONTINUED | OUTPATIENT
Start: 2024-06-28 | End: 2024-06-30

## 2024-06-28 RX ORDER — NICARDIPINE HYDROCHLORIDE 0.2 MG/ML
0-15 INJECTION INTRAVENOUS CONTINUOUS
Status: DISCONTINUED | OUTPATIENT
Start: 2024-06-28 | End: 2024-06-30

## 2024-06-28 RX ORDER — HYDROCODONE BITARTRATE AND ACETAMINOPHEN 500; 5 MG/1; MG/1
TABLET ORAL
Status: DISCONTINUED | OUTPATIENT
Start: 2024-06-28 | End: 2024-07-02 | Stop reason: HOSPADM

## 2024-06-28 RX ORDER — FUROSEMIDE 10 MG/ML
20 INJECTION INTRAMUSCULAR; INTRAVENOUS 2 TIMES DAILY
Status: DISCONTINUED | OUTPATIENT
Start: 2024-06-28 | End: 2024-07-01

## 2024-06-28 RX ADMIN — ACETAMINOPHEN 1000 MG: 500 TABLET ORAL at 03:06

## 2024-06-28 RX ADMIN — FAMOTIDINE 20 MG: 10 INJECTION INTRAVENOUS at 10:06

## 2024-06-28 RX ADMIN — POLYETHYLENE GLYCOL 3350 17 G: 17 POWDER, FOR SOLUTION ORAL at 10:06

## 2024-06-28 RX ADMIN — NICARDIPINE HYDROCHLORIDE 5 MG/HR: 0.2 INJECTION, SOLUTION INTRAVENOUS at 05:06

## 2024-06-28 RX ADMIN — ACETAMINOPHEN 1000 MG: 500 TABLET ORAL at 09:06

## 2024-06-28 RX ADMIN — OXYCODONE HYDROCHLORIDE 10 MG: 10 TABLET ORAL at 05:06

## 2024-06-28 RX ADMIN — FUROSEMIDE 20 MG: 10 INJECTION, SOLUTION INTRAVENOUS at 10:06

## 2024-06-28 RX ADMIN — DOCUSATE SODIUM 100 MG: 100 CAPSULE, LIQUID FILLED ORAL at 10:06

## 2024-06-28 RX ADMIN — METOPROLOL TARTRATE 25 MG: 25 TABLET, FILM COATED ORAL at 08:06

## 2024-06-28 RX ADMIN — CEFAZOLIN 2 G: 2 INJECTION, POWDER, FOR SOLUTION INTRAMUSCULAR; INTRAVENOUS at 10:06

## 2024-06-28 RX ADMIN — CEFAZOLIN 2 G: 2 INJECTION, POWDER, FOR SOLUTION INTRAMUSCULAR; INTRAVENOUS at 03:06

## 2024-06-28 RX ADMIN — MUPIROCIN: 20 OINTMENT TOPICAL at 10:06

## 2024-06-28 RX ADMIN — ATORVASTATIN CALCIUM 40 MG: 40 TABLET, FILM COATED ORAL at 10:06

## 2024-06-28 RX ADMIN — ASPIRIN 325 MG ORAL TABLET 325 MG: 325 PILL ORAL at 10:06

## 2024-06-28 RX ADMIN — FUROSEMIDE 10 MG: 10 INJECTION, SOLUTION INTRAVENOUS at 12:06

## 2024-06-28 RX ADMIN — DOCUSATE SODIUM 100 MG: 100 CAPSULE, LIQUID FILLED ORAL at 08:06

## 2024-06-28 RX ADMIN — CALCIUM GLUCONATE 3 G: 20 INJECTION, SOLUTION INTRAVENOUS at 12:06

## 2024-06-28 RX ADMIN — METOPROLOL TARTRATE 25 MG: 25 TABLET, FILM COATED ORAL at 12:06

## 2024-06-28 RX ADMIN — FAMOTIDINE 20 MG: 20 TABLET ORAL at 08:06

## 2024-06-28 RX ADMIN — OXYCODONE 5 MG: 5 TABLET ORAL at 10:06

## 2024-06-28 RX ADMIN — OXYCODONE 5 MG: 5 TABLET ORAL at 03:06

## 2024-06-28 RX ADMIN — ACETAMINOPHEN 1000 MG: 500 TABLET ORAL at 05:06

## 2024-06-28 RX ADMIN — FENTANYL CITRATE 25 MCG: 50 INJECTION INTRAMUSCULAR; INTRAVENOUS at 04:06

## 2024-06-28 RX ADMIN — MUPIROCIN: 20 OINTMENT TOPICAL at 08:06

## 2024-06-28 NOTE — PROGRESS NOTES
[facility]  Critical Care - Surgery  Progress Note    Patient Name: Tanika Olivera  MRN: [id number]  Admission Date: 6/25/2024  Hospital Length of Stay: 3 days  Code Status: Prior  Attending Provider: Jean Paul Sharma MD  Primary Care Provider: Esdras Green MD   Principal Problem: Type 1 dissection of thoracic aorta    Subjective:     Hospital/ICU Course:  No notes on file    Interval History/Significant Events: Returned from OR yesterday afternoon. Progressing towards extubation this morning. Doing SBT this morning. Given 1u RBC for hgb 6.9. Given 500 cc Albumin yesterday and Lasix 10 @ MN with good UOP w/ 1.6 L overnight. Alert and following commands. Cardene @ 2.5 mg/hr. Epi 0.02.     Follow-up For: Procedure(s) (LRB):  ASCENDING AORTA REPAIR (N/A)    Post-Operative Day: 1 Day Post-Op    Objective:     Vital Signs (Most Recent):  Temp: 98.6 °F (37 °C) (06/28/24 0700)  Pulse: 73 (06/28/24 0715)  Resp: 17 (06/28/24 0715)  BP: (!) 111/55 (06/28/24 0700)  SpO2: 95 % (06/28/24 0715) Vital Signs (24h Range):  Temp:  [97.3 °F (36.3 °C)-98.6 °F (37 °C)] 98.6 °F (37 °C)  Pulse:  [61-86] 73  Resp:  [0-27] 17  SpO2:  [95 %-100 %] 95 %  BP: ()/(51-68) 111/55  Arterial Line BP: ()/(50-71) 108/50     Weight: 57.4 kg (126 lb 8.7 oz)  Body mass index is 25.56 kg/m².      Intake/Output Summary (Last 24 hours) at 6/28/2024 0731  Last data filed at 6/28/2024 0705  Gross per 24 hour   Intake 6098.17 ml   Output 3720 ml   Net 2378.17 ml          Physical Exam  Constitutional:       Comments: Intubated   HENT:      Head: Normocephalic and atraumatic.   Eyes:      Extraocular Movements: Extraocular movements intact.      Conjunctiva/sclera: Conjunctivae normal.   Cardiovascular:      Rate and Rhythm: Normal rate and regular rhythm.      Comments: Right radial A-line  2 mediastinal CT w/ appropriate output  Pulmonary:      Effort: Pulmonary effort is normal. No respiratory distress.   Abdominal:       General: There is no distension.      Palpations: Abdomen is soft.      Tenderness: There is no abdominal tenderness.   Genitourinary:     Comments: Michael  Musculoskeletal:         General: Normal range of motion.   Skin:     General: Skin is warm and dry.   Neurological:      General: No focal deficit present.      Mental Status: She is alert and oriented to person, place, and time.      Comments: CN II-XII grossly intact  Motor and sensation intact in bilateral upper and lower extremities   Psychiatric:         Mood and Affect: Mood normal.         Behavior: Behavior normal.            Vents:  Vent Mode: Spont (06/28/24 0510)  Set Rate: 18 BPM (06/28/24 0309)  Vt Set: 325 mL (06/28/24 0309)  Pressure Support: 12 cmH20 (06/28/24 0510)  PEEP/CPAP: 5 cmH20 (06/28/24 0510)  Oxygen Concentration (%): 40 (06/28/24 0715)  Peak Airway Pressure: 16 cmH20 (06/28/24 0510)  Plateau Pressure: 0 cmH20 (06/28/24 0510)  Total Ve: 5.6 L/m (06/28/24 0510)  Negative Inspiratory Force (cm H2O): 0 (06/28/24 0510)  F/VT Ratio<105 (RSBI): (!) 26.32 (06/28/24 0510)    Lines/Drains/Airways       Central Venous Catheter Line  Duration             Trialysis (Dialysis) Catheter 06/27/24 right internal jugular 1 day              Drain  Duration                  NG/OG Tube 06/27/24 Right nostril 1 day         Chest Tube 06/27/24 1227 Tube - 1 Right Mediastinal 32 Fr. <1 day         Chest Tube 06/27/24 1228 Tube - 2 Left Mediastinal 32 Fr. <1 day         Urethral Catheter 06/27/24 0900 Non-latex;Straight-tip;Temperature probe 16 Fr. <1 day              Airway  Duration                  Airway - Non-Surgical 06/27/24 0718 1 day              Arterial Line  Duration             Arterial Line 06/27/24 0903 Left Radial <1 day              Line  Duration                  Pacer Wires 06/27/24 1056 <1 day              Peripheral Intravenous Line  Duration                  Peripheral IV - Single Lumen 06/25/24 1432 18 G Anterior;Left Forearm 2 days          Peripheral IV - Single Lumen 06/27/24 18 G Left Wrist 1 day                    Significant Labs:    CBC/Anemia Profile:  Recent Labs   Lab 06/27/24  1513 06/27/24  1515 06/27/24 2018 06/27/24 2106 06/28/24  0307 06/28/24  0340 06/28/24  0508   WBC 12.50  --  10.72  --   --  9.38  --    HGB 8.6*  --  7.3*  --   --  6.9*  --    HCT 25.3*   < > 20.3*   < > 18* 19.5* 25*     --  198  --   --  177  --    MCV 92  --  89  --   --  89  --    RDW 13.1  --  13.2  --   --  13.3  --     < > = values in this interval not displayed.        Chemistries:  Recent Labs   Lab 06/27/24  1513 06/27/24 2113 06/28/24  0340    142 142   K 3.8  3.8 3.6 3.7    113* 110   CO2 24 21* 24   BUN 9 11 10   CREATININE 0.6 0.6 0.7   CALCIUM 7.2* 6.9* 8.4*   ALBUMIN 2.2* 2.7* 2.8*   PROT 4.6* 4.5* 4.6*   BILITOT 0.7 0.6 0.4   ALKPHOS 40* 30* 32*   ALT 20 15 14   AST 49* 47* 47*   MG 2.6 2.3 2.3   PHOS 1.6* 1.7* 3.1       All pertinent labs within the past 24 hours have been reviewed.    Significant Imaging:  I have reviewed all pertinent imaging results/findings within the past 24 hours.  I have reviewed and interpreted all pertinent imaging results/findings within the past 24 hours.  Assessment/Plan:     Cardiac/Vascular  * Type 1 dissection of thoracic aorta  Neuro/Psych:   - Sedation: None  - Pain:    - Tylenol 650 mg PRN, Oxycodone PRN   - Q1 neurovascular checks    - PRN Meclizine 12.5 mg for dizziness    - CTA H&N: evolving R PICA infarct and severely narrowed R vertebral artery from dissection flap.                Cardiac: POD 1 s/p emergent repair of Type A aortic dissection under hypothermic circulatory arrest, ascending and hemiarch replacement, and anterograde cerebral perfusion.  - BP Goal: MAP 60-70, SBP <120.   - Pressors: None  - Rhythm: NSR  - Cardene gtt and Epi 0.02  - 2 mediastinal CT with appropriate output.   - Starting  mg      -TTE 6/26: Normal biventricular function with EF 65-70%. No signs of  AR. Ascending aorta dilated to 4.1 cm.  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       Pulmonary:   - Goal SpO2 >92%  - Will perform SBT this morning with plans to extubate.   - ABG PRN      Renal:      Recent Labs   Lab 06/27/24  1513 06/27/24  2113 06/28/24  0340   BUN 9 11 10   CREATININE 0.6 0.6 0.7          - Michael; monitor I/Os    - UOP 1.6 L overnight. Given 500 Albumin with appropriate response in UOP.      - Lasix 10mg given at midnight.    - Trend BUN/Cr    FEN / GI:     - Daily CMP, PRN K/Mag/Phos per protocol     - Replace electrolytes as needed    - Nutrition:NPO until extubated    - Bowel Regimen: Will start when appropriate        ID:   - Afebrile. WBC stable  Recent Labs   Lab 06/27/24  1513 06/27/24  2018 06/28/24  0340   WBC 12.50 10.72 9.38         Heme/Onc:   - Hgb 13.7 pre-operatively  Recent Labs   Lab 06/27/24  1348 06/27/24  1513 06/27/24  2018 06/28/24  0340   HGB  --  8.6* 7.3* 6.9*   PLT  --  231 198 177   APTT 27.9 30.0  --  21.7   INR 1.3* 1.2  --  1.1       - CBC, PTT/INR daily  - DVT ppx: Will start when appropriate    Endocrine:   - CTS Goal -140  - HgbA1c: Not recorded  - SSI     PPx:   Feeding: NPO  Analgesia/Sedation: PRN pain meds  Thromboembolic Prevention: Will start when appropiate  HOB >30: Yes  Stress Ulcer: Not indicated  Glucose Control: 110-140     Lines/Drains/Airway:  Right radial arterial line  PIVs     Dispo/Code Status/Palliative:    - SICU   - Full Code          Critical care was time spent personally by me on the following activities: development of treatment plan with patient or surrogate and bedside caregivers, discussions with consultants, evaluation of patient's response to treatment, examination of patient, ordering and performing treatments and interventions, ordering and review of laboratory studies, ordering and review of radiographic studies, pulse oximetry,  re-evaluation of patient's condition.  This critical care time did not overlap with that of any other provider or involve time for any procedures.     Florian Horta DO  Critical Care - Surgery  [facility]

## 2024-06-28 NOTE — PT/OT/SLP EVAL
Physical Therapy Co-Evaluation with OT and Treatment     Patient Name:  Tanika Olivera  MRN: [id number]    Admit Date: 6/25/2024  Admitting Diagnosis:  Type 1 dissection of thoracic aorta  Length of Stay: 3 days  Recent Surgery: Procedure(s) (LRB):  ASCENDING AORTA REPAIR (N/A) 1 Day Post-Op    Recommendations:     Discharge Recommendations: no therapy indicated  Equipment recommendations: none  Barriers to discharge: None Identified     X1 person assist transfer to recliner  Assessment:     Tanika Olivera is a 73 y.o. female admitted to [facility] on 6/25/2024 with medical diagnosis of Type 1 dissection of thoracic aorta. Pt presents with weakness, impaired endurance, impaired functional mobility, decreased coordination, impaired cardiopulmonary response to activity. These deficits effect their roles and responsibilities in which they were able to complete prior to admit.     Pt is agreeable to therapy evaluation and session. PTA, pt was very active, walking ~ 4 miles a day on the treadmill. Educated on sternal precautions and verbalized understanding. Requires minimal assist for bed mobility. Able to stand and stand pivot transfer to recliner. MAP decreased to mid 60s but with legs elevated and ankle pump performed was able to return to WNL (goal >70) within 1 minute. Left up in recliner with nsg in room and VSS.    Tanika Olivera would benefit from acute PT intervention to improve quality of life, focus on recovery of impairments, provide patient/caregiver education, reduce fall risk, and maximize (I) and safety with functional mobility. Once medically stable, recommending pt discharge to no therapy indicated.    Rehab Prognosis: Good    Plan:     During this hospitalization, patient to be seen 5 x/week to address the identified rehab impairments via gait training, therapeutic activities, neuromuscular re-education, therapeutic exercises and progress towards stated goals.     Plan of Care Expires:  07/28/24  Plan of Care  "reviewed with: patient    This plan of care has been discussed with the patient/caregiver, who was included in its development and is in agreement with the identified goals and treatment plan.     Subjective     Communicated with RN prior to session.  Patient found supine upon PT entry to room, agreeable to evaluation. Pt alone during session.    Chief Complaint: none stated    Patient/Family Comments/goals: "I have to walk today?"    Pain/Comfort:  Pain Rating 1: 0/10  Pain Rating Post-Intervention 1: 0/10    Patients cultural, spiritual, Bahai conflicts given the current situation: None identified     Patient History: information obtained from pt     Living Environment: Pt lives with  in single level home  with threshold [place]. Bathroom set-up: tub/shower combo  Prior Level of Function: independent with mobility and ADLs  DME owned: none  Support Available/Caregiver Assistance:   as needed  Drives: yes  Works: no  Objective:     Patient found with: arterial line, blood pressure cuff, chest tube, central line, telemetry, pulse ox (continuous), peripheral IV, oxygen, hargrove catheter    Recent Surgery: Procedure(s) (LRB):  ASCENDING AORTA REPAIR (N/A) 1 Day Post-Op  General Precautions: Standard, fall, sternal   Orthopedic Precautions:    Braces:     Oxygen Device: nasal cannula 3L      Exams:    Cognition:  Alert  Command following: Follows multistep verbal commands  Communication: clear/fluent    Sensation:   Light touch sensation: Intact BLEs    Gross Motor Coordination: No deficits noted during functional mobility tasks     Edema/Skin Integrity: None noted; Visible skin intact    Postural examination/scapula alignment: no deficits noted    Lower Extremity Range of Motion:  Right Lower Extremity: WFL  Left Lower Extremity: WFL    Lower Extremity Strength:  Right Lower Extremity: WFL  Left Lower Extremity: WFL    Functional Mobility:    Bed Mobility:  Scooting with maximal assistance  Increased " assist needed d/t bed  Supine > Sit with minimum assistance    Transfers:   Sit <> Stand Transfer: Contact Guard Assistance x 1 trials from eob with no AD   Bed <> Chair: Contact Guard Assistance with no AD via stand pivot transfer    Balance:  Dynamic Sitting: GOOD: Maintains balance through MODERATE excursions of active trunk movement  Standing:  Static: FAIR+: Takes MINIMAL challenges from all directions   Dynamic: FAIR: Needs CONTACT GUARD during gait    Outcome Measure: AM-PAC 6 CLICK MOBILITY  Total Score:18     Patient/Caregiver Education:   Therapist reinforced education re:   Post-op sternal precautions, including no lifting > 5 lbs, pulling or pushing with BUEs.    Modifying daily activities and functional mobility tasks to maintain sternal precautions appropriately   Importance of participation in therapy and engaging in increased OOB mobility with assistance to improve endurance     Therapist educated pt/caregiver regarding:   PT POC and goals for therapy   Safety with mobility and fall risk   Instruction on use of call button and importance of calling nursing staff for assistance with mobility   Time provided for therapeutic counseling and discussion of current health disposition. All questions answered to satisfaction, within scope of PT practice     Patient/caregiver able to verbalize understanding and expressed no further questions this visit; will follow-up with pt/caregiver during current admit for additional questions/concerns within scope of practice.     White board updated.     Patient left up in chair with all lines intact, call button in reach, and nsg present.    GOALS:   Multidisciplinary Problems       Physical Therapy Goals          Problem: Physical Therapy    Goal Priority Disciplines Outcome Goal Variances Interventions   Physical Therapy Goal     PT, PT/OT Progressing     Description: Goals to be met by: 7/28/24     Patient will increase functional independence with mobility by  performin. Supine to sit with [place]  2. Sit to supine with [place]  3. Sit to stand transfer with Supervision  4. Bed to chair transfer with Supervision using No Assistive Device  5. Gait  x 250 feet with Supervision using No Assistive Device.                            History:     Past Medical History:   Diagnosis Date    History of hepatitis C, s/p successful Rx w/ SVR24 (cure) - 2017    S/p epclusa w/ SVR    HTN (hypertension)     Hyperlipidemia     Osteoporosis        Past Surgical History:   Procedure Laterality Date    BREAST SURGERY  2007    Breast implants    COLONOSCOPY N/A 2022    Procedure: COLONOSCOPY;  Surgeon: Fran Palomares MD;  Location: [facility];  Service: Endoscopy;  Laterality: N/A;    EYE SURGERY  2019    Cataract surgery    REPAIR OF ASCENDING AORTA N/A 2024    Procedure: ASCENDING AORTA REPAIR;  Surgeon: Jean Paul Sharma MD;  Location: [facility] OR [address] [place] [address];  Service: Cardiovascular;  Laterality: N/A;  Type A Dissection repair, Hemiarch replacement 28 mm graft , Deep hypothermic circulatory arrest    TOTAL ABDOMINAL HYSTERECTOMY         Time Tracking:     PT Received On: 24  PT Start Time: 1052     PT Stop Time: 1116  PT Total Time (min): 24 min     Billable Minutes: Evaluation 10 and Therapeutic Activity 14    2024

## 2024-06-28 NOTE — PLAN OF CARE
PT evaluation complete - see note for details. POC and goals established.    Problem: Physical Therapy  Goal: Physical Therapy Goal  Description: Goals to be met by: 24     Patient will increase functional independence with mobility by performin. Supine to sit with [place]  2. Sit to supine with [place]  3. Sit to stand transfer with Supervision  4. Bed to chair transfer with Supervision using No Assistive Device  5. Gait  x 250 feet with Supervision using No Assistive Device.     Outcome: Progressing     2024

## 2024-06-28 NOTE — PT/OT/SLP EVAL
Occupational Therapy   Co-Evaluation/Treatment    Co-treatment performed due to patient's multiple deficits requiring two skilled therapists to appropriately and safely assess patient's strength and endurance while facilitating functional tasks in addition to accommodating for patient's activity tolerance.       Name: Tanika Olivera  MRN: [id number]  Admitting Diagnosis: Type 1 dissection of thoracic aorta  Recent Surgery: Procedure(s) (LRB):  ASCENDING AORTA REPAIR (N/A) 1 Day Post-Op    Recommendations:     Discharge Recommendations: No Therapy Indicated  Discharge Equipment Recommendations:  none  Barriers to discharge:  None    Assessment:     Tanika Olivera is a 73 y.o. female with a medical diagnosis of Type 1 dissection of thoracic aorta. Performance deficits affecting function: weakness, impaired endurance, impaired self care skills, impaired functional mobility, gait instability, impaired balance, impaired cardiopulmonary response to activity, decreased coordination.  Pt s/p ASCENDING AORTA REPAIR 6/27. Pt found supine in bed with nurse present and motivated to participate in skilled therapy services. Session focused on bed mobility, STS, t/f, ADL retraining, education on sternal precautions. Pt tolerated bed mobility with Chau and stand pivot t/f to bedside chair with CGA. MAP dropped  mid 60s, pt then elevated legs and performed ankle pumps with MAP return WNL within 1 min.     Rehab Prognosis: Good; patient would benefit from acute skilled OT services to address these deficits and reach maximum level of function.       Plan:     Patient to be seen 5 x/week to address the above listed problems via self-care/home management, therapeutic activities, therapeutic exercises  Plan of Care Expires: 07/28/24  Plan of Care Reviewed with: patient    Subjective     Chief Complaint: none   Patient/Family Comments/goals: Patient agreed to therapy     Occupational Profile:  Living Environment: resides with  in [facility]  w/ threshold to enter. Bathroom includes low toilet with tub/shower combo.   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 She enjoys working out 4-5x/week.   Equipment Used at Home: none  Assistance upon Discharge:  who is retired     Pain/Comfort:  Pain Rating 1: 0/10  Pain Addressed 1: Reposition, Distraction, Cessation of Activity  Pain Rating Post-Intervention 1: 0/10    Patients cultural, spiritual, Voodoo conflicts given the current situation:  no    Objective:     Communicated with: NSPASCUAL prior to session.  Patient found supine with arterial line, blood pressure cuff, central line, chest tube, hargrove catheter, oxygen, peripheral IV, telemetry, pulse ox (continuous) upon OT entry to room.    General Precautions: Standard, fall, sternal  Orthopedic Precautions: N/A  Braces: N/A  Respiratory Status: Nasal cannula, flow 3 L/min    Occupational Performance:    Bed Mobility:    Patient completed Scooting/Bridging with maximal assistance  Patient completed Supine to Sit with minimum assistance    Functional Mobility/Transfers:  Patient completed Sit <> Stand Transfer with contact guard assistance  with  no assistive device   Patient completed Bed <> Chair Transfer using Stand Pivot technique with contact guard assistance with no assistive device  Functional Mobility: Pt seated EOB with Chau-CGA and performed stand pivot t/f to bedside chair with CGA.     Activities of Daily Living:  Grooming: performed facial care seated in bedside chair with set up assistance    Lower Body Dressing: pt seated EOB donned socks with totalA      Cognitive/Visual Perceptual:  Cognitive/Psychosocial Skills:     -       Oriented to: Person, Place, Time, and Situation   -       Follows Commands/attention:Follows multistep  commands  -       Communication: clear/fluent  -       Safety awareness/insight to disability: intact   Visual/Perceptual:      -Intact       Physical Exam:  Balance:    -       Static: Fair +; Dynamic: Fair   Upper Extremity Range of Motion:     -       Right Upper Extremity: WNL  -       Left Upper Extremity: WNL  Upper Extremity Strength: not assessed due to sternal precautions   Strength:    -       Right Upper Extremity: WFL  -       Left Upper Extremity: WFL  Fine Motor Coordination:    -       Intact  Gross motor coordination:   WFL    AMPAC 6 Click ADL:  AMPAC Total Score: 18    Treatment & Education:  Role of OT and POC   Education of sternal precautions: no pushing, pulling, or lifting >5lbs with BUE   Bed Mobility   Safety with t/f while adhering to sternal precautions   Importance of EOB/OOB actvitiy     Patient left up in chair with all lines intact, call button in reach, nurse present, and all needs met    GOALS:   Multidisciplinary Problems       Occupational Therapy Goals          Problem: Occupational Therapy    Goal Priority Disciplines Outcome Interventions   Occupational Therapy Goal     OT, PT/OT Progressing    Description: Goals to be met by: 6/5/2024     Patient will increase functional independence with ADLs by performing:    LE Dressing with Modified [place].  Grooming while standing at sink with Supervision.  Toileting from toilet with Modified [place] for hygiene and clothing management.   Supine to sit with [place].  Step transfer with Supervision  Toilet transfer to toilet with Supervision.                         History:     Past Medical History:   Diagnosis Date    History of hepatitis C, s/p successful Rx w/ SVR24 (cure) - 11/2018 2/23/2017    S/p epclusa w/ SVR    HTN (hypertension)     Hyperlipidemia     Osteoporosis          Past Surgical History:   Procedure Laterality Date    BREAST SURGERY  02/13/2007    Breast implants    COLONOSCOPY N/A 07/05/2022    Procedure: COLONOSCOPY;  Surgeon: Fran Palomares MD;  Location: [facility];  Service: Endoscopy;  Laterality: N/A;    EYE SURGERY  4/17/2019     Cataract surgery    REPAIR OF ASCENDING AORTA N/A 6/27/2024    Procedure: ASCENDING AORTA REPAIR;  Surgeon: Jean Paul Sharma MD;  Location: [facility] OR [address] [place] [address];  Service: Cardiovascular;  Laterality: N/A;  Type A Dissection repair, Hemiarch replacement 28 mm graft , Deep hypothermic circulatory arrest    TOTAL ABDOMINAL HYSTERECTOMY         Time Tracking:     OT Date of Treatment: 06/28/24  OT Start Time: 1052  OT Stop Time: 1117  OT Total Time (min): 25 min    Billable Minutes:Evaluation 10  Self Care/Home Management 15    6/28/2024

## 2024-06-28 NOTE — SUBJECTIVE & OBJECTIVE
Interval History/Significant Events: Cardene on and off throughout night and currently on 2.5 mg/hr. 12 beat run of Vtach for 5-8 seconds with drop in BP 80/50s. EKG, ABG, ECHO, and EP consult pending. Her O2 requiremtn increased from 3L to 5L with O2 sat 89-92%. CT output 230 cc yesterday. UOP 2.2 L yesterday. Net -2.2. L. Electrolyte replacing. Recheck K. Will pull R IJ CVC today.     Follow-up For: Procedure(s) (LRB):  ASCENDING AORTA REPAIR (N/A)    Post-Operative Day: 1 Day Post-Op    Objective:     Vital Signs (Most Recent):  Temp: 98.6 °F (37 °C) (06/28/24 0700)  Pulse: 69 (06/28/24 0830)  Resp: 14 (06/28/24 0830)  BP: (!) 111/55 (06/28/24 0700)  SpO2: 97 % (06/28/24 0830) Vital Signs (24h Range):  Temp:  [97.3 °F (36.3 °C)-98.6 °F (37 °C)] 98.6 °F (37 °C)  Pulse:  [61-86] 69  Resp:  [0-27] 14  SpO2:  [95 %-100 %] 97 %  BP: ()/(51-68) 111/55  Arterial Line BP: ()/(50-71) 112/50     Weight: 57.4 kg (126 lb 8.7 oz)  Body mass index is 25.56 kg/m².      Intake/Output Summary (Last 24 hours) at 6/28/2024 0917  Last data filed at 6/28/2024 0800  Gross per 24 hour   Intake 6122.2 ml   Output 3865 ml   Net 2257.2 ml          Physical Exam  Constitutional:       Comments: Sitting up in chair   HENT:      Head: Normocephalic and atraumatic.   Eyes:      Extraocular Movements: Extraocular movements intact.      Conjunctiva/sclera: Conjunctivae normal.   Neck:      Comments: R IJ CVC  Cardiovascular:      Rate and Rhythm: Normal rate and regular rhythm.      Comments: Right radial A-line  2 mediastinal CT w/ appropriate output  Pulmonary:      Effort: Pulmonary effort is normal. No respiratory distress.   Abdominal:      General: There is no distension.      Palpations: Abdomen is soft.      Tenderness: There is no abdominal tenderness.   Genitourinary:     Comments: Michael  Musculoskeletal:         General: Normal range of motion.   Skin:     General: Skin is warm and dry.   Neurological:      General: No  focal deficit present.      Mental Status: She is alert and oriented to person, place, and time.      Comments: CN II-XII grossly intact  Motor and sensation intact in bilateral upper and lower extremities   Psychiatric:         Mood and Affect: Mood normal.         Behavior: Behavior normal.            Vents:  Vent Mode: Spont (06/28/24 0715)  Set Rate: 18 BPM (06/28/24 0309)  Vt Set: 325 mL (06/28/24 0309)  Pressure Support: 5 cmH20 (06/28/24 0715)  PEEP/CPAP: 5 cmH20 (06/28/24 0715)  Oxygen Concentration (%): 40 (06/28/24 0800)  Peak Airway Pressure: 10 cmH20 (06/28/24 0715)  Plateau Pressure: 0 cmH20 (06/28/24 0715)  Total Ve: 6.36 L/m (06/28/24 0715)  Negative Inspiratory Force (cm H2O): 0 (06/28/24 0715)  F/VT Ratio<105 (RSBI): 242.86 (06/28/24 0715)    Lines/Drains/Airways       Central Venous Catheter Line  Duration             Trialysis (Dialysis) Catheter 06/27/24 right internal jugular 1 day              Drain  Duration                  NG/OG Tube 06/27/24 Right nostril 1 day         Urethral Catheter 06/27/24 0900 Non-latex;Straight-tip;Temperature probe 16 Fr. 1 day         Chest Tube 06/27/24 1227 Tube - 1 Right Mediastinal 32 Fr. <1 day         Chest Tube 06/27/24 1228 Tube - 2 Left Mediastinal 32 Fr. <1 day              Arterial Line  Duration             Arterial Line 06/27/24 0903 Left Radial 1 day              Line  Duration                  Pacer Wires 06/27/24 1056 <1 day              Peripheral Intravenous Line  Duration                  Peripheral IV - Single Lumen 06/25/24 1432 18 G Anterior;Left Forearm 2 days         Peripheral IV - Single Lumen 06/27/24 18 G Left Wrist 1 day                    Significant Labs:    CBC/Anemia Profile:  Recent Labs   Lab 06/27/24  1513 06/27/24  1515 06/27/24 2018 06/27/24  2106 06/28/24  0340 06/28/24  0508 06/28/24  0715   WBC 12.50  --  10.72  --  9.38  --   --    HGB 8.6*  --  7.3*  --  6.9*  --   --    HCT 25.3*   < > 20.3*   < > 19.5* 25* 26*   PLT  231  --  198  --  177  --   --    MCV 92  --  89  --  89  --   --    RDW 13.1  --  13.2  --  13.3  --   --     < > = values in this interval not displayed.        Chemistries:  Recent Labs   Lab 06/27/24  1513 06/27/24  2113 06/28/24  0340    142 142   K 3.8  3.8 3.6 3.7    113* 110   CO2 24 21* 24   BUN 9 11 10   CREATININE 0.6 0.6 0.7   CALCIUM 7.2* 6.9* 8.4*   ALBUMIN 2.2* 2.7* 2.8*   PROT 4.6* 4.5* 4.6*   BILITOT 0.7 0.6 0.4   ALKPHOS 40* 30* 32*   ALT 20 15 14   AST 49* 47* 47*   MG 2.6 2.3 2.3   PHOS 1.6* 1.7* 3.1       All pertinent labs within the past 24 hours have been reviewed.    Significant Imaging:  I have reviewed all pertinent imaging results/findings within the past 24 hours.  I have reviewed and interpreted all pertinent imaging results/findings within the past 24 hours.

## 2024-06-28 NOTE — CONSULTS
"  [facility] - Surgical Intensive Care  Adult Nutrition  Consult Note    SUMMARY     Recommendations    When able, ADAT to Cardiac.  If PO intake inadequate, add Boost Plus ONS BID.  RD to monitor & follow-up.    Goals: Meet % EEN, EPN by RD f/u date  Nutrition Goal Status: new  Communication of RD Recs: reviewed with RN    Assessment and Plan    Nutrition Problem:  Inadequate energy intake    Related to (etiology):   Inability to consume sufficient energy     Signs and Symptoms (as evidenced by):   NPO    Interventions(treatment strategy):  Collaboration of nutrition care w/ other providers    Nutrition Diagnosis Status:   New    Reason for Assessment    Reason For Assessment: consult  Diagnosis: other (see comments) (Type 1 dissection of thoracic aorta)  Relevant Medical History: HTN, HLD  Interdisciplinary Rounds: did not attend    General Information Comments: Pt extubated this AM, remains NPO - s/p ascending aorta repair. Spoke w/ family member at bedside, who reports pt w/ good appetite PTA & UBW of 110# (currently w/ mild edema). Pt appears nourished w/ no indicators of malnutrition.   Nutrition Discharge Planning: Pending clinical course    Nutrition/Diet History    Food Allergies: NKFA  Factors Affecting Nutritional Intake: NPO    Anthropometrics    Temp: 98.6 °F (37 °C)  Height: 4' 11" (149.9 cm)  Height (inches): 59 in  Weight Method: Bed Scale  Weight: 57.4 kg (126 lb 8.7 oz)  Weight (lb): 126.55 lb  Ideal Body Weight (IBW), Female: 95 lb  % Ideal Body Weight, Female (lb): 133.21 %  BMI (Calculated): 25.5  BMI Grade: 25 - 29.9 - overweight    Lab/Procedures/Meds    Pertinent Labs Reviewed: reviewed  Pertinent Medications Reviewed: reviewed  Pertinent Medications Comments: Insulin, Nicardipine    Estimated/Assessed Needs    Weight Used For Calorie Calculations: 57.4 kg (126 lb 8.7 oz)    Energy Calorie Requirements (kcal): 1435 kcal/d  Energy Need Method: Kcal/kg (25 kcal/kg)    Protein Requirements: " [facility] - [facility]  Procedure Note    Elan Flavors Patient Status:  Outpatient in a Bed    1967 MRN [id number]   Location [facility] Attending Kwasi Hernandez MD   Hosp Day # 0 PCP Tana Frost MD     Proc 69 g/d (1.2 g/kg)  Weight Used For Protein Calculations: 57.4 kg (126 lb 8.7 oz)    Estimated Fluid Requirement Method: other (see comments) (Per MD)  RDA Method (mL): 1435    Nutrition Prescription Ordered    Current Diet Order: NPO    Evaluation of Received Nutrient/Fluid Intake    I/O: +4.3L since admit    Comments: LBM: 6/25    Nutrition Risk    Level of Risk/Frequency of Follow-up:  (1x/week)     Monitor and Evaluation    Food and Nutrient Intake: food and beverage intake, energy intake  Food and Nutrient Adminstration: diet order  Physical Activity and Function: nutrition-related ADLs and IADLs  Anthropometric Measurements: weight, weight change  Biochemical Data, Medical Tests and Procedures: lipid profile, inflammatory profile, glucose/endocrine profile, gastrointestinal profile, electrolyte and renal panel  Nutrition-Focused Physical Findings: overall appearance     Nutrition Follow-Up    RD Follow-up?: Yes

## 2024-06-28 NOTE — PLAN OF CARE
SICU PLAN OF CARE    Dx: s/p repair of Type 1 dissection of thoracic aorta     Goals of Care: MAP 70-80, SBP <120    Vital Signs (last 12 hours):   Temp:  [97.7 °F (36.5 °C)-98.6 °F (37 °C)]   Pulse:  [61-81]   Resp:  [9-30]   BP: ()/(55-68)   SpO2:  [92 %-100 %]   Arterial Line BP: (102-145)/(47-71)      Neuro: AAOx4, Follows commands , and Moves all extremities spontaneously     Cardiac: NSR 60s-70s    Respiratory:  3L Nasal Cannula     Gtts: none    Urine Output: Urethral Catheter  1950 mL/shift    Drains: Chest Tube, total output 145mL/shift    Diet: Cardiac      Labs/Accuchecks: WDL    Skin:  Midsternal Chest Incision with Hydrocolloid dsg CDI. Chest tube and groin site with island boarder dsgs CDI.  Patient turned q2h, bony prominences protected, and mattress inflated/working correctly. OOBTC today.    Shift Events:  Extubated this morning, tolerated well.  See flowsheet for further assessment/details.  Family updated on current condition/plan of care, questions answered, and emotional support provided.  MD updated on current condition, vitals, labs, and gtts.

## 2024-06-28 NOTE — PROGRESS NOTES
"[facility]  Endocrinology  Progress Note    Admit Date: 6/25/2024     Reason for Consult: Management of Hyperglycemia     Surgical Procedure and Date: Emergent repair of type A aortic dissection     No known hx of DM and not on meds for DM    HPI:   Patient is a 73 y.o. female with PMH of HTN, HLD, anxiety disorder, history of hepatitis C s/p successful treatment in 2018, osteopenia who presents as a transfer from [facility] for evaluation by cardiothoracic surgery for a Type A Aortic dissection.  CT showed  Aneurysmal dilation of the visualized ascending thoracic aorta with partially thrombosed ascending aortic dissection ([place] type A). Dissection extending into the right brachiocephalic, and proximal most right subclavian and right vertebral arteries. Continuation of the dissection into the right common carotid, carotid bulb and proximal most right internal/external carotid arteries. S/p emergent repair.  Endocrine consulted for bg management      Interval HPI:   No acute events overnight. Patient in room 28922 CVICU/75886 CVICU A. Blood glucose stable. BG at goal on current insulin regimen (IIP). Steroid use- None .   1 Day Post-Op  Renal function- Normal   Vasopressors-  Epinephrine     Diet NPO Except for: Sips with Medication     Eating:   NPO  Nausea: No  Hypoglycemia and intervention: No  Fever: No  TPN and/or TF: No      BP (!) 111/55 (BP Location: Right arm, Patient Position: Lying)   Pulse 77   Temp 98.6 °F (37 °C) (Oral)   Resp (!) 22   Ht 4' 11" (1.499 m)   Wt 57.4 kg (126 lb 8.7 oz)   SpO2 96%   BMI 25.56 kg/m²     Labs Reviewed and Include    Recent Labs   Lab 06/28/24  0340   *   CALCIUM 8.4*   ALBUMIN 2.8*   PROT 4.6*      K 3.7   CO2 24      BUN 10   CREATININE 0.7   ALKPHOS 32*   ALT 14   AST 47*   BILITOT 0.4     Lab Results   Component Value Date    WBC 9.38 06/28/2024    HGB 6.9 (L) 06/28/2024    HCT 25 (L) 06/28/2024    MCV " "89 06/28/2024     06/28/2024     No results for input(s): "TSH", "FREET4" in the last 168 hours.  No results found for: "HGBA1C"    Nutritional status:   Body mass index is 25.56 kg/m².  Lab Results   Component Value Date    ALBUMIN 2.8 (L) 06/28/2024    ALBUMIN 2.7 (L) 06/27/2024    ALBUMIN 2.2 (L) 06/27/2024     No results found for: "PREALBUMIN"    Estimated Creatinine Clearance: 56.8 mL/min (based on SCr of 0.7 mg/dL).    Accu-Checks  Recent Labs     06/28/24  0101 06/28/24  0204 06/28/24  0304 06/28/24  0406 06/28/24  0423 06/28/24  0505 06/28/24  0522 06/28/24  0558 06/28/24  0616 06/28/24  0721   POCTGLUCOSE 147* 131* 120* 97 115* 99 103 95 106 102       Current Medications and/or Treatments Impacting Glycemic Control  Immunotherapy:    Immunosuppressants       None          Steroids:   Hormones (From admission, onward)      None          Pressors:    Autonomic Drugs (From admission, onward)      Start     Stop Route Frequency Ordered    06/27/24 1520  EPINEPHrine 5 mg in dextrose 5% 250 mL infusion (premix)        Question Answer Comment   Begin at (in mcg/kg/min): 0.02    Titrate by: (in mcg/kg/min) 0.02    Titrate interval: (in minutes) 5    Titrate to maintain: (SBP or MAP or Cardiac Index) MAP    Greater than: (in mmHg) 70    Maximum dose: (in mcg/kg/min) 2        -- IV Continuous 06/27/24 1521    06/27/24 1520  NORepinephrine 4 mg in dextrose 5% 250 mL infusion (premix)        Question Answer Comment   Begin at (in mcg/kg/min): 0.02    Titrate by: (in mcg/kg/min (RANGE PREFERRED) 0.02 - 0.2    Titrate interval: (in minutes) (RANGE PREFERRED) 2 - 5    Titrate to maintain: (MAP or SBP) MAP    Titrate to keep MAP within the range or GREATER than (if single number): (in mmHg) 70 - 80    Maximum dose: (in mcg/kg/min) 3        -- IV Continuous 06/27/24 1521          Hyperglycemia/Diabetes Medications:   Antihyperglycemics (From admission, onward)      Start     Stop Route Frequency Ordered    06/28/24 " 0930  insulin aspart U-100 pen 0-5 Units         -- SubQ Every 4 hours PRN 06/28/24 0830            ASSESSMENT and PLAN    Cardiac/Vascular  * Type 1 dissection of thoracic aorta  Managed per primary team  Optimize bg control        Hyperlipidemia    On statin  Managed by primary    Endocrine  Stress hyperglycemia  BG goal: 140-180 given age  No known hx of DM.  S/p aortic dissection repair.  On IIP.  Bg stable, titrated off early this morning.  Will monitor on correction    - Discontinue Intensive insulin drip  - Start Novolog Low dose correction with ISF 50 starting at 150    - POCT Glucose every 4 hours  - Hypoglycemia protocol in place      ** Please notify Endocrine for any change and/or advance in diet**  ** Please call Endocrine for any BG related issues **     Discharge Planning:   TBD. Please notify endocrinology prior to discharge.            Art Hester PA-C  Endocrinology  [facility]

## 2024-06-28 NOTE — ASSESSMENT & PLAN NOTE
BG goal: 140-180 given age  No known hx of DM.  S/p aortic dissection repair.  On IIP.  Bg stable, titrated off early this morning.  Will monitor on correction    - Discontinue Intensive insulin drip  - Start Novolog Low dose correction with ISF 50 starting at 150    - POCT Glucose every 4 hours  - Hypoglycemia protocol in place      ** Please notify Endocrine for any change and/or advance in diet**  ** Please call Endocrine for any BG related issues **     Discharge Planning:   TBD. Please notify endocrinology prior to discharge.

## 2024-06-28 NOTE — ASSESSMENT & PLAN NOTE
Neuro/Psych:   - Sedation: None  - Pain:    - Tylenol 650 mg PRN, Oxycodone PRN   - Q1 neurovascular checks    - PRN Meclizine 12.5 mg for dizziness    - CTA H&N: evolving R PICA infarct and severely narrowed R vertebral artery from dissection flap.                Cardiac: POD 1 s/p emergent repair of Type A aortic dissection under hypothermic circulatory arrest, ascending and hemiarch replacement, and anterograde cerebral perfusion.  - BP Goal: MAP 60-70, SBP <120.   - Pressors: None  - Rhythm: NSR  - Cardene gtt and Epi 0.02  - 2 mediastinal CT with appropriate output.   - Starting  mg      -TTE 6/26: Normal biventricular function with EF 65-70%. No signs of AR. Ascending aorta dilated to 4.1 cm.  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       Pulmonary:   - Goal SpO2 >92%  - Will perform SBT this morning with plans to extubate.   - ABG PRN      Renal:      Recent Labs   Lab 06/27/24  1513 06/27/24 2113 06/28/24  0340   BUN 9 11 10   CREATININE 0.6 0.6 0.7          - Michael; monitor I/Os    - UOP 1.6 L overnight. Given 500 Albumin with appropriate response in UOP.      - Lasix 10mg given at midnight.    - Trend BUN/Cr    FEN / GI:     - Daily CMP, PRN K/Mag/Phos per protocol     - Replace electrolytes as needed    - Nutrition:NPO until extubated    - Bowel Regimen: Will start when appropriate        ID:   - Afebrile. WBC stable  Recent Labs   Lab 06/27/24  1513 06/27/24 2018 06/28/24  0340   WBC 12.50 10.72 9.38         Heme/Onc:   - Hgb 13.7 pre-operatively  Recent Labs   Lab 06/27/24  1348 06/27/24  1513 06/27/24 2018 06/28/24  0340   HGB  --  8.6* 7.3* 6.9*   PLT  --  231 198 177   APTT 27.9 30.0  --  21.7   INR 1.3* 1.2  --  1.1       - CBC, PTT/INR daily  - DVT ppx: Will start when appropriate    Endocrine:   - CTS Goal -140  - HgbA1c: Not recorded  - SSI     PPx:   Feeding: NPO  Analgesia/Sedation: PRN pain  meds  Thromboembolic Prevention: Will start when appropiate  HOB >30: Yes  Stress Ulcer: Not indicated  Glucose Control: 110-140     Lines/Drains/Airway:  Right radial arterial line  PIVs     Dispo/Code Status/Palliative:    - SICU   - Full Code

## 2024-06-28 NOTE — PLAN OF CARE
SICU PLAN OF CARE    Dx: Type 1 dissection of thoracic aorta    Goals of Care: MAP 70-80, SBP < 120    Vital Signs (last 12 hours):   Temp:  [97.9 °F (36.6 °C)-98.2 °F (36.8 °C)]   Pulse:  [61-81]   Resp:  [0-24]   BP: ()/(51-68)   SpO2:  [95 %-100 %]   Arterial Line BP: (101-145)/(52-71)      Neuro: Follows commands , Moves all extremities spontaneously , Intubated , and Sedated    Cardiac: NSR        Respiratory: Ventilator; Mode: A/C, Volume Control, 50% FiO2, 5 PEEP    Gtts: Epinephrine , Norepinephrine, Propofol, Insulin, and Cardene    Urine Output: Urethral Catheter  1770 mL/shift        Drains: Chest Tube, total output 130mL/shift    Diet: NPO     Restraints Q2 checks, no skin breakdown noted      Labs/Accuchecks: Q1 Accuchecks, Q2 ABGs w/ lytes, daily AM labs    Skin:  No new skin breakdown noted this shift.  Patient turned q2h, bony prominences protected, and waffle mattress inflated/working correctly.   Romain Score: 13. If Romain Score is 16 or less, complete 4EYES note each shift.    Shift Events: 10 mg Lasix IVP given for low UOP. Levo titrated to off. 1u PRBCs given for critical H&H. PRN meds given for pain control. Electrolytes replaced per orders. Prop turned off, vent placed on spontaneous mode, plans for extubation this AM. See flowsheet for further assessment/details.

## 2024-06-28 NOTE — PLAN OF CARE
Recommendations     When able, ADAT to Cardiac.  If PO intake inadequate, add Boost Plus ONS BID.  RD to monitor & follow-up.     Goals: Meet % EEN, EPN by RD f/u date  Nutrition Goal Status: new  Communication of RD Recs: reviewed with RN

## 2024-06-28 NOTE — PLAN OF CARE
Problem: Occupational Therapy  Goal: Occupational Therapy Goal  Description: Goals to be met by: 6/5/2024     Patient will increase functional independence with ADLs by performing:    LE Dressing with Modified [place].  Grooming while standing at sink with Supervision.  Toileting from toilet with Modified [place] for hygiene and clothing management.   Supine to sit with [place].  Step transfer with Supervision  Toilet transfer to toilet with Supervision.    Outcome: Progressing   Patient's goals are set.

## 2024-06-28 NOTE — SUBJECTIVE & OBJECTIVE
"Interval HPI:   No acute events overnight. Patient in room 48409 CVICU/98814 CVICU A. Blood glucose stable. BG at goal on current insulin regimen (IIP). Steroid use- None .   1 Day Post-Op  Renal function- Normal   Vasopressors-  Epinephrine     Diet NPO Except for: Sips with Medication     Eating:   NPO  Nausea: No  Hypoglycemia and intervention: No  Fever: No  TPN and/or TF: No      BP (!) 111/55 (BP Location: Right arm, Patient Position: Lying)   Pulse 77   Temp 98.6 °F (37 °C) (Oral)   Resp (!) 22   Ht 4' 11" (1.499 m)   Wt 57.4 kg (126 lb 8.7 oz)   SpO2 96%   BMI 25.56 kg/m²     Labs Reviewed and Include    Recent Labs   Lab 06/28/24  0340   *   CALCIUM 8.4*   ALBUMIN 2.8*   PROT 4.6*      K 3.7   CO2 24      BUN 10   CREATININE 0.7   ALKPHOS 32*   ALT 14   AST 47*   BILITOT 0.4     Lab Results   Component Value Date    WBC 9.38 06/28/2024    HGB 6.9 (L) 06/28/2024    HCT 25 (L) 06/28/2024    MCV 89 06/28/2024     06/28/2024     No results for input(s): "TSH", "FREET4" in the last 168 hours.  No results found for: "HGBA1C"    Nutritional status:   Body mass index is 25.56 kg/m².  Lab Results   Component Value Date    ALBUMIN 2.8 (L) 06/28/2024    ALBUMIN 2.7 (L) 06/27/2024    ALBUMIN 2.2 (L) 06/27/2024     No results found for: "PREALBUMIN"    Estimated Creatinine Clearance: 56.8 mL/min (based on SCr of 0.7 mg/dL).    Accu-Checks  Recent Labs     06/28/24  0101 06/28/24  0204 06/28/24  0304 06/28/24  0406 06/28/24  0423 06/28/24  0505 06/28/24  0522 06/28/24  0558 06/28/24  0616 06/28/24  0721   POCTGLUCOSE 147* 131* 120* 97 115* 99 103 95 106 102       Current Medications and/or Treatments Impacting Glycemic Control  Immunotherapy:    Immunosuppressants       None          Steroids:   Hormones (From admission, onward)      None          Pressors:    Autonomic Drugs (From admission, onward)      Start     Stop Route Frequency Ordered    06/27/24 1520  EPINEPHrine 5 mg in " dextrose 5% 250 mL infusion (premix)        Question Answer Comment   Begin at (in mcg/kg/min): 0.02    Titrate by: (in mcg/kg/min) 0.02    Titrate interval: (in minutes) 5    Titrate to maintain: (SBP or MAP or Cardiac Index) MAP    Greater than: (in mmHg) 70    Maximum dose: (in mcg/kg/min) 2        -- IV Continuous 06/27/24 1521    06/27/24 1520  NORepinephrine 4 mg in dextrose 5% 250 mL infusion (premix)        Question Answer Comment   Begin at (in mcg/kg/min): 0.02    Titrate by: (in mcg/kg/min (RANGE PREFERRED) 0.02 - 0.2    Titrate interval: (in minutes) (RANGE PREFERRED) 2 - 5    Titrate to maintain: (MAP or SBP) MAP    Titrate to keep MAP within the range or GREATER than (if single number): (in mmHg) 70 - 80    Maximum dose: (in mcg/kg/min) 3        -- IV Continuous 06/27/24 1521          Hyperglycemia/Diabetes Medications:   Antihyperglycemics (From admission, onward)      Start     Stop Route Frequency Ordered    06/28/24 0930  insulin aspart U-100 pen 0-5 Units         -- SubQ Every 4 hours PRN 06/28/24 0830

## 2024-06-28 NOTE — SUBJECTIVE & OBJECTIVE
Interval History/Significant Events: Returned from OR yesterday afternoon. Progressing towards extubation this morning. Doing SBT this morning. Given 1u RBC for hgb 6.9. Given 500 cc Albumin yesterday and Lasix 10 @ MN with good UOP w/ 1.6 L overnight. Alert and following commands. Cardene @ 2.5 mg/hr. Epi 0.02.     Follow-up For: Procedure(s) (LRB):  ASCENDING AORTA REPAIR (N/A)    Post-Operative Day: 1 Day Post-Op    Objective:     Vital Signs (Most Recent):  Temp: 98.6 °F (37 °C) (06/28/24 0700)  Pulse: 73 (06/28/24 0715)  Resp: 17 (06/28/24 0715)  BP: (!) 111/55 (06/28/24 0700)  SpO2: 95 % (06/28/24 0715) Vital Signs (24h Range):  Temp:  [97.3 °F (36.3 °C)-98.6 °F (37 °C)] 98.6 °F (37 °C)  Pulse:  [61-86] 73  Resp:  [0-27] 17  SpO2:  [95 %-100 %] 95 %  BP: ()/(51-68) 111/55  Arterial Line BP: ()/(50-71) 108/50     Weight: 57.4 kg (126 lb 8.7 oz)  Body mass index is 25.56 kg/m².      Intake/Output Summary (Last 24 hours) at 6/28/2024 0731  Last data filed at 6/28/2024 0705  Gross per 24 hour   Intake 6098.17 ml   Output 3720 ml   Net 2378.17 ml          Physical Exam  Constitutional:       Comments: Intubated   HENT:      Head: Normocephalic and atraumatic.   Eyes:      Extraocular Movements: Extraocular movements intact.      Conjunctiva/sclera: Conjunctivae normal.   Cardiovascular:      Rate and Rhythm: Normal rate and regular rhythm.      Comments: Right radial A-line  2 mediastinal CT w/ appropriate output  Pulmonary:      Effort: Pulmonary effort is normal. No respiratory distress.   Abdominal:      General: There is no distension.      Palpations: Abdomen is soft.      Tenderness: There is no abdominal tenderness.   Genitourinary:     Comments: Michael  Musculoskeletal:         General: Normal range of motion.   Skin:     General: Skin is warm and dry.   Neurological:      General: No focal deficit present.      Mental Status: She is alert and oriented to person, place, and time.      Comments:  CN II-XII grossly intact  Motor and sensation intact in bilateral upper and lower extremities   Psychiatric:         Mood and Affect: Mood normal.         Behavior: Behavior normal.            Vents:  Vent Mode: Spont (06/28/24 0510)  Set Rate: 18 BPM (06/28/24 0309)  Vt Set: 325 mL (06/28/24 0309)  Pressure Support: 12 cmH20 (06/28/24 0510)  PEEP/CPAP: 5 cmH20 (06/28/24 0510)  Oxygen Concentration (%): 40 (06/28/24 0715)  Peak Airway Pressure: 16 cmH20 (06/28/24 0510)  Plateau Pressure: 0 cmH20 (06/28/24 0510)  Total Ve: 5.6 L/m (06/28/24 0510)  Negative Inspiratory Force (cm H2O): 0 (06/28/24 0510)  F/VT Ratio<105 (RSBI): (!) 26.32 (06/28/24 0510)    Lines/Drains/Airways       Central Venous Catheter Line  Duration             Trialysis (Dialysis) Catheter 06/27/24 right internal jugular 1 day              Drain  Duration                  NG/OG Tube 06/27/24 Right nostril 1 day         Chest Tube 06/27/24 1227 Tube - 1 Right Mediastinal 32 Fr. <1 day         Chest Tube 06/27/24 1228 Tube - 2 Left Mediastinal 32 Fr. <1 day         Urethral Catheter 06/27/24 0900 Non-latex;Straight-tip;Temperature probe 16 Fr. <1 day              Airway  Duration                  Airway - Non-Surgical 06/27/24 0718 1 day              Arterial Line  Duration             Arterial Line 06/27/24 0903 Left Radial <1 day              Line  Duration                  Pacer Wires 06/27/24 1056 <1 day              Peripheral Intravenous Line  Duration                  Peripheral IV - Single Lumen 06/25/24 1432 18 G Anterior;Left Forearm 2 days         Peripheral IV - Single Lumen 06/27/24 18 G Left Wrist 1 day                    Significant Labs:    CBC/Anemia Profile:  Recent Labs   Lab 06/27/24  1513 06/27/24  1515 06/27/24 2018 06/27/24  2106 06/28/24  0307 06/28/24  0340 06/28/24  0508   WBC 12.50  --  10.72  --   --  9.38  --    HGB 8.6*  --  7.3*  --   --  6.9*  --    HCT 25.3*   < > 20.3*   < > 18* 19.5* 25*     --  198  --    --  177  --    MCV 92  --  89  --   --  89  --    RDW 13.1  --  13.2  --   --  13.3  --     < > = values in this interval not displayed.        Chemistries:  Recent Labs   Lab 06/27/24  1513 06/27/24  2113 06/28/24  0340    142 142   K 3.8  3.8 3.6 3.7    113* 110   CO2 24 21* 24   BUN 9 11 10   CREATININE 0.6 0.6 0.7   CALCIUM 7.2* 6.9* 8.4*   ALBUMIN 2.2* 2.7* 2.8*   PROT 4.6* 4.5* 4.6*   BILITOT 0.7 0.6 0.4   ALKPHOS 40* 30* 32*   ALT 20 15 14   AST 49* 47* 47*   MG 2.6 2.3 2.3   PHOS 1.6* 1.7* 3.1       All pertinent labs within the past 24 hours have been reviewed.    Significant Imaging:  I have reviewed all pertinent imaging results/findings within the past 24 hours.  I have reviewed and interpreted all pertinent imaging results/findings within the past 24 hours.

## 2024-06-29 LAB
ABO + RH BLD: NORMAL
ALBUMIN SERPL BCP-MCNC: 2.7 G/DL (ref 3.5–5.2)
ALLENS TEST: ABNORMAL
ALP SERPL-CCNC: 41 U/L (ref 55–135)
ALT SERPL W/O P-5'-P-CCNC: 14 U/L (ref 10–44)
ANION GAP SERPL CALC-SCNC: 6 MMOL/L (ref 8–16)
APTT PPP: 27.4 SEC (ref 21–32)
ASCENDING AORTA: 2.73 CM
AST SERPL-CCNC: 39 U/L (ref 10–40)
AV INDEX (PROSTH): 1.01
AV MEAN GRADIENT: 3 MMHG
AV PEAK GRADIENT: 5 MMHG
AV VALVE AREA BY VELOCITY RATIO: 2.71 CM²
AV VALVE AREA: 2.88 CM²
AV VELOCITY RATIO: 0.95
BILIRUB SERPL-MCNC: 0.5 MG/DL (ref 0.1–1)
BLD GP AB SCN CELLS X3 SERPL QL: NORMAL
BLD PROD TYP BPU: NORMAL
BLOOD UNIT EXPIRATION DATE: NORMAL
BLOOD UNIT TYPE CODE: 6200
BLOOD UNIT TYPE: NORMAL
BSA FOR ECHO PROCEDURE: 1.54 M2
BUN SERPL-MCNC: 13 MG/DL (ref 8–23)
CALCIUM SERPL-MCNC: 8.1 MG/DL (ref 8.7–10.5)
CHLORIDE SERPL-SCNC: 106 MMOL/L (ref 95–110)
CO2 SERPL-SCNC: 26 MMOL/L (ref 23–29)
CODING SYSTEM: NORMAL
CREAT SERPL-MCNC: 0.7 MG/DL (ref 0.5–1.4)
CROSSMATCH INTERPRETATION: NORMAL
CV ECHO LV RWT: 0.33 CM
DISPENSE STATUS: NORMAL
DOP CALC AO PEAK VEL: 1.15 M/S
DOP CALC AO VTI: 19.33 CM
DOP CALC LVOT AREA: 2.9 CM2
DOP CALC LVOT DIAMETER: 1.91 CM
DOP CALC LVOT PEAK VEL: 1.09 M/S
DOP CALC LVOT STROKE VOLUME: 55.67 CM3
DOP CALCLVOT PEAK VEL VTI: 19.44 CM
E WAVE DECELERATION TIME: 123.57 MSEC
E/A RATIO: 1.2
E/E' RATIO: 11.78 M/S
ECHO LV POSTERIOR WALL: 0.66 CM (ref 0.6–1.1)
ERYTHROCYTE [DISTWIDTH] IN BLOOD BY AUTOMATED COUNT: 13.4 % (ref 11.5–14.5)
EST. GFR  (NO RACE VARIABLE): >60 ML/MIN/1.73 M^2
FRACTIONAL SHORTENING: 26 % (ref 28–44)
GLUCOSE SERPL-MCNC: 112 MG/DL (ref 70–110)
HCO3 UR-SCNC: 26 MMOL/L (ref 24–28)
HCT VFR BLD AUTO: 26.7 % (ref 37–48.5)
HGB BLD-MCNC: 8.9 G/DL (ref 12–16)
INR PPP: 1 (ref 0.8–1.2)
INTERVENTRICULAR SEPTUM: 0.62 CM (ref 0.6–1.1)
IVRT: 88.49 MSEC
LA MAJOR: 5.39 CM
LA MINOR: 5.32 CM
LA WIDTH: 4.17 CM
LEFT ATRIUM SIZE: 3.16 CM
LEFT ATRIUM VOLUME INDEX MOD: 31.7 ML/M2
LEFT ATRIUM VOLUME INDEX: 39.5 ML/M2
LEFT ATRIUM VOLUME MOD: 48.23 CM3
LEFT ATRIUM VOLUME: 59.98 CM3
LEFT INTERNAL DIMENSION IN SYSTOLE: 2.94 CM (ref 2.1–4)
LEFT VENTRICLE DIASTOLIC VOLUME INDEX: 45.91 ML/M2
LEFT VENTRICLE DIASTOLIC VOLUME: 69.79 ML
LEFT VENTRICLE MASS INDEX: 46 G/M2
LEFT VENTRICLE SYSTOLIC VOLUME INDEX: 21.9 ML/M2
LEFT VENTRICLE SYSTOLIC VOLUME: 33.26 ML
LEFT VENTRICULAR INTERNAL DIMENSION IN DIASTOLE: 3.99 CM (ref 3.5–6)
LEFT VENTRICULAR MASS: 69.52 G
LV LATERAL E/E' RATIO: 13.25 M/S
LV SEPTAL E/E' RATIO: 10.6 M/S
MAGNESIUM SERPL-MCNC: 2.1 MG/DL (ref 1.6–2.6)
MCH RBC QN AUTO: 30.7 PG (ref 27–31)
MCHC RBC AUTO-ENTMCNC: 33.3 G/DL (ref 32–36)
MCV RBC AUTO: 92 FL (ref 82–98)
MV PEAK A VEL: 0.88 M/S
MV PEAK E VEL: 1.06 M/S
MV STENOSIS PRESSURE HALF TIME: 35.84 MS
MV VALVE AREA P 1/2 METHOD: 6.14 CM2
NUM UNITS TRANS PACKED RBC: NORMAL
PCO2 BLDA: 37.9 MMHG (ref 35–45)
PH SMN: 7.45 [PH] (ref 7.35–7.45)
PHOSPHATE SERPL-MCNC: 1.9 MG/DL (ref 2.7–4.5)
PLATELET # BLD AUTO: 167 K/UL (ref 150–450)
PMV BLD AUTO: 10 FL (ref 9.2–12.9)
PO2 BLDA: 79 MMHG (ref 80–100)
POC BE: 2 MMOL/L
POC SATURATED O2: 96 % (ref 95–100)
POC TCO2: 27 MMOL/L (ref 23–27)
POCT GLUCOSE: 110 MG/DL (ref 70–110)
POCT GLUCOSE: 122 MG/DL (ref 70–110)
POTASSIUM SERPL-SCNC: 3.4 MMOL/L (ref 3.5–5.1)
POTASSIUM SERPL-SCNC: 3.8 MMOL/L (ref 3.5–5.1)
PROT SERPL-MCNC: 5.1 G/DL (ref 6–8.4)
PROTHROMBIN TIME: 11.4 SEC (ref 9–12.5)
RA MAJOR: 4.17 CM
RA PRESSURE ESTIMATED: 0 MMHG
RA WIDTH: 3.54 CM
RBC # BLD AUTO: 2.9 M/UL (ref 4–5.4)
SAMPLE: ABNORMAL
SINUS: 2.58 CM
SITE: ABNORMAL
SODIUM SERPL-SCNC: 138 MMOL/L (ref 136–145)
SPECIMEN OUTDATE: NORMAL
STJ: 2.51 CM
TDI LATERAL: 0.08 M/S
TDI SEPTAL: 0.1 M/S
TDI: 0.09 M/S
TRANS ERYTHROCYTES VOL PATIENT: NORMAL ML
TRICUSPID ANNULAR PLANE SYSTOLIC EXCURSION: 0.49 CM
WBC # BLD AUTO: 12.37 K/UL (ref 3.9–12.7)
Z-SCORE OF LEFT VENTRICULAR DIMENSION IN END DIASTOLE: -1.1
Z-SCORE OF LEFT VENTRICULAR DIMENSION IN END SYSTOLE: 0.49

## 2024-06-29 PROCEDURE — 82803 BLOOD GASES ANY COMBINATION: CPT

## 2024-06-29 PROCEDURE — 86850 RBC ANTIBODY SCREEN: CPT | Performed by: THORACIC SURGERY (CARDIOTHORACIC VASCULAR SURGERY)

## 2024-06-29 PROCEDURE — 94761 N-INVAS EAR/PLS OXIMETRY MLT: CPT

## 2024-06-29 PROCEDURE — 85027 COMPLETE CBC AUTOMATED: CPT | Performed by: STUDENT IN AN ORGANIZED HEALTH CARE EDUCATION/TRAINING PROGRAM

## 2024-06-29 PROCEDURE — 25000003 PHARM REV CODE 250

## 2024-06-29 PROCEDURE — 97530 THERAPEUTIC ACTIVITIES: CPT

## 2024-06-29 PROCEDURE — 99232 SBSQ HOSP IP/OBS MODERATE 35: CPT | Mod: ,,, | Performed by: PHYSICIAN ASSISTANT

## 2024-06-29 PROCEDURE — 80053 COMPREHEN METABOLIC PANEL: CPT | Performed by: STUDENT IN AN ORGANIZED HEALTH CARE EDUCATION/TRAINING PROGRAM

## 2024-06-29 PROCEDURE — 97535 SELF CARE MNGMENT TRAINING: CPT

## 2024-06-29 PROCEDURE — 85610 PROTHROMBIN TIME: CPT | Performed by: STUDENT IN AN ORGANIZED HEALTH CARE EDUCATION/TRAINING PROGRAM

## 2024-06-29 PROCEDURE — 84100 ASSAY OF PHOSPHORUS: CPT | Performed by: STUDENT IN AN ORGANIZED HEALTH CARE EDUCATION/TRAINING PROGRAM

## 2024-06-29 PROCEDURE — 27000221 HC OXYGEN, UP TO 24 HOURS

## 2024-06-29 PROCEDURE — 83735 ASSAY OF MAGNESIUM: CPT | Performed by: STUDENT IN AN ORGANIZED HEALTH CARE EDUCATION/TRAINING PROGRAM

## 2024-06-29 PROCEDURE — 85730 THROMBOPLASTIN TIME PARTIAL: CPT | Performed by: STUDENT IN AN ORGANIZED HEALTH CARE EDUCATION/TRAINING PROGRAM

## 2024-06-29 PROCEDURE — 20000000 HC ICU ROOM

## 2024-06-29 PROCEDURE — 25000003 PHARM REV CODE 250: Performed by: ANESTHESIOLOGY

## 2024-06-29 PROCEDURE — 63600175 PHARM REV CODE 636 W HCPCS: Performed by: STUDENT IN AN ORGANIZED HEALTH CARE EDUCATION/TRAINING PROGRAM

## 2024-06-29 PROCEDURE — 25000003 PHARM REV CODE 250: Performed by: STUDENT IN AN ORGANIZED HEALTH CARE EDUCATION/TRAINING PROGRAM

## 2024-06-29 PROCEDURE — 99900035 HC TECH TIME PER 15 MIN (STAT)

## 2024-06-29 PROCEDURE — 97116 GAIT TRAINING THERAPY: CPT

## 2024-06-29 PROCEDURE — 63600175 PHARM REV CODE 636 W HCPCS

## 2024-06-29 PROCEDURE — 86900 BLOOD TYPING SEROLOGIC ABO: CPT | Performed by: THORACIC SURGERY (CARDIOTHORACIC VASCULAR SURGERY)

## 2024-06-29 PROCEDURE — 86901 BLOOD TYPING SEROLOGIC RH(D): CPT | Performed by: THORACIC SURGERY (CARDIOTHORACIC VASCULAR SURGERY)

## 2024-06-29 PROCEDURE — 37799 UNLISTED PX VASCULAR SURGERY: CPT

## 2024-06-29 PROCEDURE — 84132 ASSAY OF SERUM POTASSIUM: CPT | Performed by: THORACIC SURGERY (CARDIOTHORACIC VASCULAR SURGERY)

## 2024-06-29 RX ORDER — SODIUM,POTASSIUM PHOSPHATES 280-250MG
2 POWDER IN PACKET (EA) ORAL
Status: DISCONTINUED | OUTPATIENT
Start: 2024-06-29 | End: 2024-06-30

## 2024-06-29 RX ORDER — LANOLIN ALCOHOL/MO/W.PET/CERES
800 CREAM (GRAM) TOPICAL
Status: DISCONTINUED | OUTPATIENT
Start: 2024-06-29 | End: 2024-06-30

## 2024-06-29 RX ORDER — TALC
6 POWDER (GRAM) TOPICAL NIGHTLY PRN
Status: DISCONTINUED | OUTPATIENT
Start: 2024-06-29 | End: 2024-07-02 | Stop reason: HOSPADM

## 2024-06-29 RX ORDER — INSULIN ASPART 100 [IU]/ML
0-5 INJECTION, SOLUTION INTRAVENOUS; SUBCUTANEOUS
Status: DISCONTINUED | OUTPATIENT
Start: 2024-06-29 | End: 2024-07-01

## 2024-06-29 RX ADMIN — FUROSEMIDE 20 MG: 10 INJECTION, SOLUTION INTRAVENOUS at 09:06

## 2024-06-29 RX ADMIN — ASPIRIN 325 MG ORAL TABLET 325 MG: 325 PILL ORAL at 09:06

## 2024-06-29 RX ADMIN — MUPIROCIN: 20 OINTMENT TOPICAL at 09:06

## 2024-06-29 RX ADMIN — DOCUSATE SODIUM 100 MG: 100 CAPSULE, LIQUID FILLED ORAL at 09:06

## 2024-06-29 RX ADMIN — ACETAMINOPHEN 1000 MG: 500 TABLET ORAL at 06:06

## 2024-06-29 RX ADMIN — POTASSIUM BICARBONATE 50 MEQ: 978 TABLET, EFFERVESCENT ORAL at 04:06

## 2024-06-29 RX ADMIN — POLYETHYLENE GLYCOL 3350 17 G: 17 POWDER, FOR SOLUTION ORAL at 09:06

## 2024-06-29 RX ADMIN — ACETAMINOPHEN 1000 MG: 500 TABLET ORAL at 09:06

## 2024-06-29 RX ADMIN — FAMOTIDINE 20 MG: 20 TABLET ORAL at 09:06

## 2024-06-29 RX ADMIN — OXYCODONE 5 MG: 5 TABLET ORAL at 01:06

## 2024-06-29 RX ADMIN — Medication 6 MG: at 10:06

## 2024-06-29 RX ADMIN — POTASSIUM CHLORIDE 40 MEQ: 29.8 INJECTION, SOLUTION INTRAVENOUS at 02:06

## 2024-06-29 RX ADMIN — FUROSEMIDE 20 MG: 10 INJECTION, SOLUTION INTRAVENOUS at 06:06

## 2024-06-29 RX ADMIN — ATORVASTATIN CALCIUM 40 MG: 40 TABLET, FILM COATED ORAL at 09:06

## 2024-06-29 RX ADMIN — METOPROLOL TARTRATE 25 MG: 25 TABLET, FILM COATED ORAL at 09:06

## 2024-06-29 NOTE — PLAN OF CARE
Please merge this note with today's resident critical care note.    SICU Staff Addendum  I have reviewed and concur with the resident's/NP's history, physical, assessment, and plan.  I have personally interviewed and examined the patient at bedside.  See below for any additional findings/changes.    Reason for admission:  Type 1 dissection of thoracic aorta  Present on Admission:   Type 1 dissection of thoracic aorta   Hypertension   Hyperlipidemia   Anxiety disorder      Active issues/Goals for Today:       She is now POD 2 s/p emergent type A aortic dissection repair under DHCA, ascending and hemiarch replacement. She is sitting in a chair and appears to be in good spirits.     Plan :     - Continue with frequent neurovascular checks.  - Pain control  - Continue BB, ASA and statin. Will stop cardene. Aiming for MAP 75 to 85.  - Wean supplemental oxygen for SpO2 > 88-90%, pO2 > 65. IS.  - Cardiac diet as tolerated. Bowel regimen.  - Diuresis with IV lasix as per primary.  - Monitor CT output   - Monitor Hgb and aim for Hgb > 7   - SUP     37 minutes of critical care time was spent personally by me on the following activities: development of treatment plan with patient or surrogate and bedside caregivers, discussions with consultants, evaluation of patient's response to treatment, examination of patient, ordering and performing treatments and interventions, ordering and review of laboratory studies, ordering and review of radiographic studies, pulse oximetry, re-evaluation of patient's condition.  This critical care time did not overlap with that of any other provider or involve time for any procedures.    Barrie Mosher MD  Anesthesiology/Critical Care

## 2024-06-29 NOTE — ASSESSMENT & PLAN NOTE
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                Cardiac: POD 2 s/p emergent repair of Type A aortic dissection under hypothermic circulatory arrest, ascending and hemiarch replacement, and anterograde cerebral perfusion.  - BP Goal: MAP 75-85. No SBP goal.  - Pressors: None  - Rhythm: NSR. HR 61-69  - Cardene gtt @ 2.5. Will titrate off.   - 2 mediastinal CT with appropriate output.   - Starting  mg  - 6/29: 12 beat run of Vtach with drop in BP 80/50s that recovered shortly after. Electrolytes repleted. EKG, ABG, TTE ordered. Discussed with EP who will hold off on official consult. Likely due to electrolyte imbalance.       -TTE 6/26: Normal biventricular function with EF 65-70%. No signs of AR. Ascending aorta dilated to 4.1 cm.  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       Pulmonary:   - Goal SpO2 >92%  - On 5 L NC. Wean as tolerated. Will work towards titrating Cardene drip off with it likely causing increasing O2 requirement due to shunting.   - ABG PRN      Renal:      Recent Labs   Lab 06/27/24 2113 06/28/24  0340 06/29/24  0115   BUN 11 10 13   CREATININE 0.6 0.7 0.7          - Michael; monitor I/Os    - UOP 265cc overnight. 2.2 L yesterday. Net -2.2. L.    - Lasix 20mg BID    - Trend BUN/Cr    FEN / GI:     - Daily CMP, PRN K/Mag/Phos per protocol     - Replace electrolytes as needed    - Nutrition: Cardiac diet    - Bowel Regimen: Miralax and docusate        ID:   - Afebrile. WBC stable  Recent Labs   Lab 06/27/24 2018 06/28/24  0340 06/29/24  0115   WBC 10.72 9.38 12.37         Heme/Onc:   - Hgb 13.7 pre-operatively  Recent Labs   Lab 06/27/24  1513 06/27/24 2018 06/28/24  0340 06/29/24  0115   HGB 8.6* 7.3* 6.9* 8.9*    198 177 167   APTT 30.0  --   21.7 27.4   INR 1.2  --  1.1 1.0       - CBC daily  - DVT ppx: Will start when appropriate  -  mg daily    Endocrine:   - CTS Goal -140  - HgbA1c: Not recorded  - SSI     PPx:   Feeding: Cardiac  Analgesia/Sedation: Tylenol; PRN Oxy  Thromboembolic Prevention: Will start when appropiate  HOB >30: Yes  Stress Ulcer: Famotidine  Glucose Control: 110-140     Lines/Drains/Airway:  Right radial arterial line  PIVs  R IJ CVC-pull today  Alec     Dispo/Code Status/Palliative:    - SICU   - Full Code

## 2024-06-29 NOTE — PT/OT/SLP PROGRESS
"Occupational Therapy   Co-Treatment    Co-treatment performed due to patient's multiple deficits requiring two skilled therapists to appropriately and safely assess patient's strength and endurance while facilitating functional tasks in addition to accommodating for patient's activity tolerance.       Name: Tanika Olivera  MRN: [id number]  Admitting Diagnosis:  Type 1 dissection of thoracic aorta  2 Days Post-Op    Recommendations:     Discharge Recommendations: No Therapy Indicated  Discharge Equipment Recommendations:  none  Barriers to discharge:  None    Assessment:     Tanika Olivera is a 73 y.o. female with a medical diagnosis of Type 1 dissection of thoracic aorta. Performance deficits affecting function are weakness, impaired endurance, impaired self care skills, impaired functional mobility, gait instability, impaired balance, impaired cardiopulmonary response to activity, decreased coordination. Pt found upright in chair and motivated to participate in skilled therapy. Session focused on and pt tolerated STS t/f from bedside chair with CGA and ambulation to room sink where she took a seated rest break before STS and performing g/h task for ~4mins. Pt then had another seated rest break before ambulating back to bedside and sitting in chair. Pt was unable to recalled sternal precautions pre-session or post-session with reeducation. Spouse & pt provided verbal understanding of 3/3. VSS throughout session.     Rehab Prognosis:  Good; patient would benefit from acute skilled OT services to address these deficits and reach maximum level of function.       Plan:     Patient to be seen 5 x/week to address the above listed problems via self-care/home management, therapeutic activities, therapeutic exercises  Plan of Care Expires: 07/28/24  Plan of Care Reviewed with: patient, spouse    Subjective     Chief Complaint: dizziness and room spinning   Patient/Family Comments/goals: "when can I lift " "weights"  Pain/Comfort:  Pain Rating 1:  (pt didnt rate)    Objective:     Communicated with: NSG prior to session.  Patient found up in chair with arterial line, blood pressure cuff, central line, chest tube, hargrove catheter, pulse ox (continuous), telemetry, peripheral IV, oxygen upon OT entry to room.    General Precautions: Standard, fall, sternal    Orthopedic Precautions:N/A  Braces: N/A  Respiratory Status: Nasal cannula, flow 5 L/min     Occupational Performance:     Bed Mobility:    Pt found up in chair     Functional Mobility/Transfers:  Patient completed Sit <> Stand Transfer x3 reps with contact guard assistance  with  hand-held assist   Functional Mobility: Pt ambulated from bedside chair>room sink then required seated rest break before standing g/h task. Pt then required rest break prior to ambulating back to bedside chair with CGA throughout.     Activities of Daily Living:  Grooming: performed oral and facial care standing at sink with set up assistance and v/c to adhere to sternal precautions         [facility] 6 Click ADL: 18    Treatment & Education:  Role of OT and POC   Education of sternal precautions: pushing, pulling, picking up >5lbs  Importance of OOB activity   ADL retraining   Functional Ambulation to assess home/community mobility     Patient left up in chair with all lines intact, call button in reach, nurse notified, spouse present, and all needs met    GOALS:   Multidisciplinary Problems       Occupational Therapy Goals          Problem: Occupational Therapy    Goal Priority Disciplines Outcome Interventions   Occupational Therapy Goal     OT, PT/OT Progressing    Description: Goals to be met by: 6/5/2024     Patient will increase functional independence with ADLs by performing:    LE Dressing with Modified [place].  Grooming while standing at sink with Supervision.  Toileting from toilet with Modified [place] for hygiene and clothing management.   Supine to sit with " [place].  Step transfer with Supervision  Toilet transfer to toilet with Supervision.                         Time Tracking:     OT Date of Treatment: 06/29/24  OT Start Time: 1308  OT Stop Time: 1334  OT Total Time (min): 26 min    Billable Minutes:Self Care/Home Management 26    OT/CASPER: OT          6/29/2024

## 2024-06-29 NOTE — PROGRESS NOTES
[facility]  Critical Care - Surgery  Progress Note    Patient Name: Tanika Olivera  MRN: [id number]  Admission Date: 6/25/2024  Hospital Length of Stay: 4 days  Code Status: Prior  Attending Provider: Jean Paul Sharma MD  Primary Care Provider: Esdras Green MD   Principal Problem: Type 1 dissection of thoracic aorta    Subjective:     Hospital/ICU Course:  No notes on file    Interval History/Significant Events: Cardene on and off throughout night and currently on 2.5 mg/hr. 12 beat run of Vtach for 5-8 seconds with drop in BP 80/50s. EKG, ABG, ECHO, and EP consult pending. Her O2 requiremtn increased from 3L to 5L with O2 sat 89-92%. CT output 230 cc yesterday. UOP 2.2 L yesterday. Net -2.2. L. Electrolyte replacing. Recheck K. Will pull R IJ CVC today.     Follow-up For: Procedure(s) (LRB):  ASCENDING AORTA REPAIR (N/A)    Post-Operative Day: 1 Day Post-Op    Objective:     Vital Signs (Most Recent):  Temp: 98.6 °F (37 °C) (06/28/24 0700)  Pulse: 69 (06/28/24 0830)  Resp: 14 (06/28/24 0830)  BP: (!) 111/55 (06/28/24 0700)  SpO2: 97 % (06/28/24 0830) Vital Signs (24h Range):  Temp:  [97.3 °F (36.3 °C)-98.6 °F (37 °C)] 98.6 °F (37 °C)  Pulse:  [61-86] 69  Resp:  [0-27] 14  SpO2:  [95 %-100 %] 97 %  BP: ()/(51-68) 111/55  Arterial Line BP: ()/(50-71) 112/50     Weight: 57.4 kg (126 lb 8.7 oz)  Body mass index is 25.56 kg/m².      Intake/Output Summary (Last 24 hours) at 6/28/2024 0917  Last data filed at 6/28/2024 0800  Gross per 24 hour   Intake 6122.2 ml   Output 3865 ml   Net 2257.2 ml          Physical Exam  Constitutional:       Comments: Sitting up in chair   HENT:      Head: Normocephalic and atraumatic.   Eyes:      Extraocular Movements: Extraocular movements intact.      Conjunctiva/sclera: Conjunctivae normal.   Neck:      Comments: R IJ CVC  Cardiovascular:      Rate and Rhythm: Normal rate and regular rhythm.      Comments: Right radial A-line  2 mediastinal CT w/  appropriate output  Pulmonary:      Effort: Pulmonary effort is normal. No respiratory distress.   Abdominal:      General: There is no distension.      Palpations: Abdomen is soft.      Tenderness: There is no abdominal tenderness.   Genitourinary:     Comments: Michael  Musculoskeletal:         General: Normal range of motion.   Skin:     General: Skin is warm and dry.   Neurological:      General: No focal deficit present.      Mental Status: She is alert and oriented to person, place, and time.      Comments: CN II-XII grossly intact  Motor and sensation intact in bilateral upper and lower extremities   Psychiatric:         Mood and Affect: Mood normal.         Behavior: Behavior normal.            Vents:  Vent Mode: Spont (06/28/24 0715)  Set Rate: 18 BPM (06/28/24 0309)  Vt Set: 325 mL (06/28/24 0309)  Pressure Support: 5 cmH20 (06/28/24 0715)  PEEP/CPAP: 5 cmH20 (06/28/24 0715)  Oxygen Concentration (%): 40 (06/28/24 0800)  Peak Airway Pressure: 10 cmH20 (06/28/24 0715)  Plateau Pressure: 0 cmH20 (06/28/24 0715)  Total Ve: 6.36 L/m (06/28/24 0715)  Negative Inspiratory Force (cm H2O): 0 (06/28/24 0715)  F/VT Ratio<105 (RSBI): 242.86 (06/28/24 0715)    Lines/Drains/Airways       Central Venous Catheter Line  Duration             Trialysis (Dialysis) Catheter 06/27/24 right internal jugular 1 day              Drain  Duration                  NG/OG Tube 06/27/24 Right nostril 1 day         Urethral Catheter 06/27/24 0900 Non-latex;Straight-tip;Temperature probe 16 Fr. 1 day         Chest Tube 06/27/24 1227 Tube - 1 Right Mediastinal 32 Fr. <1 day         Chest Tube 06/27/24 1228 Tube - 2 Left Mediastinal 32 Fr. <1 day              Arterial Line  Duration             Arterial Line 06/27/24 0903 Left Radial 1 day              Line  Duration                  Pacer Wires 06/27/24 1056 <1 day              Peripheral Intravenous Line  Duration                  Peripheral IV - Single Lumen 06/25/24 1432 18 G  Anterior;Left Forearm 2 days         Peripheral IV - Single Lumen 06/27/24 18 G Left Wrist 1 day                    Significant Labs:    CBC/Anemia Profile:  Recent Labs   Lab 06/27/24  1513 06/27/24  1515 06/27/24 2018 06/27/24 2106 06/28/24  0340 06/28/24  0508 06/28/24  0715   WBC 12.50  --  10.72  --  9.38  --   --    HGB 8.6*  --  7.3*  --  6.9*  --   --    HCT 25.3*   < > 20.3*   < > 19.5* 25* 26*     --  198  --  177  --   --    MCV 92  --  89  --  89  --   --    RDW 13.1  --  13.2  --  13.3  --   --     < > = values in this interval not displayed.        Chemistries:  Recent Labs   Lab 06/27/24  1513 06/27/24 2113 06/28/24  0340    142 142   K 3.8  3.8 3.6 3.7    113* 110   CO2 24 21* 24   BUN 9 11 10   CREATININE 0.6 0.6 0.7   CALCIUM 7.2* 6.9* 8.4*   ALBUMIN 2.2* 2.7* 2.8*   PROT 4.6* 4.5* 4.6*   BILITOT 0.7 0.6 0.4   ALKPHOS 40* 30* 32*   ALT 20 15 14   AST 49* 47* 47*   MG 2.6 2.3 2.3   PHOS 1.6* 1.7* 3.1       All pertinent labs within the past 24 hours have been reviewed.    Significant Imaging:  I have reviewed all pertinent imaging results/findings within the past 24 hours.  I have reviewed and interpreted all pertinent imaging results/findings within the past 24 hours.  Assessment/Plan:     Cardiac/Vascular  * Type 1 dissection of thoracic aorta  Neuro/Psych:   - Sedation: None  - Pain:    - Tylenol 650 mg PRN, Oxycodone PRN    - CTA H&N 6/26: evolving R PICA infarct and severely narrowed R vertebral artery from dissection flap.                Cardiac: POD 2 s/p emergent repair of Type A aortic dissection under hypothermic circulatory arrest, ascending and hemiarch replacement, and anterograde cerebral perfusion.  - BP Goal: MAP 75-85. No SBP goal.  - Pressors: None  - Rhythm: NSR. HR 61-69  - Cardene gtt @ 2.5. Will titrate off.   - 2 mediastinal CT with appropriate output.   - Starting  mg  - 6/29: 12 beat run of Vtach with drop in BP 80/50s that recovered shortly  after. Electrolytes repleted. EKG, ABG, TTE ordered. Discussed with EP who will hold off on official consult. Likely due to electrolyte imbalance.       -TTE 6/26: Normal biventricular function with EF 65-70%. No signs of AR. Ascending aorta dilated to 4.1 cm.  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       Pulmonary:   - Goal SpO2 >92%  - On 5 L NC. Wean as tolerated. Will work towards titrating Cardene drip off with it likely causing increasing O2 requirement due to shunting.   - ABG PRN      Renal:      Recent Labs   Lab 06/27/24  2113 06/28/24  0340 06/29/24  0115   BUN 11 10 13   CREATININE 0.6 0.7 0.7          - Michael; monitor I/Os    - UOP 265cc overnight. 2.2 L yesterday. Net -2.2. L.    - Lasix 20mg BID    - Trend BUN/Cr    FEN / GI:     - Daily CMP, PRN K/Mag/Phos per protocol     - Replace electrolytes as needed    - Nutrition: Cardiac diet    - Bowel Regimen: Miralax and docusate        ID:   - Afebrile. WBC stable  Recent Labs   Lab 06/27/24  2018 06/28/24  0340 06/29/24  0115   WBC 10.72 9.38 12.37         Heme/Onc:   - Hgb 13.7 pre-operatively  Recent Labs   Lab 06/27/24  1513 06/27/24  2018 06/28/24  0340 06/29/24  0115   HGB 8.6* 7.3* 6.9* 8.9*    198 177 167   APTT 30.0  --  21.7 27.4   INR 1.2  --  1.1 1.0       - CBC daily  - DVT ppx: Will start when appropriate  -  mg daily    Endocrine:   - CTS Goal -140  - HgbA1c: Not recorded  - SSI     PPx:   Feeding: Cardiac  Analgesia/Sedation: Tylenol; PRN Oxy  Thromboembolic Prevention: Will start when appropiate  HOB >30: Yes  Stress Ulcer: Famotidine  Glucose Control: 110-140     Lines/Drains/Airway:  Right radial arterial line  PIVs  R IJ CVC-pull today  Michael     Dispo/Code Status/Palliative:    - SICU   - Full Code          Critical care was time spent personally by me on the following activities: development of treatment plan with patient or  surrogate and bedside caregivers, discussions with consultants, evaluation of patient's response to treatment, examination of patient, ordering and performing treatments and interventions, ordering and review of laboratory studies, ordering and review of radiographic studies, pulse oximetry, re-evaluation of patient's condition.  This critical care time did not overlap with that of any other provider or involve time for any procedures.     Folrian Horta,   Critical Care - Surgery  [facility] - [facility]

## 2024-06-29 NOTE — PT/OT/SLP PROGRESS
Physical Therapy Treatment  Co-treatment with OT due to acuity of condition, level of skilled assist needed for assessment of safety with mobility.   Patient Name:  Tanika Olivera   MRN:  [id number]    Recommendations:     Discharge Recommendations: No Therapy Indicated  Discharge Equipment Recommendations: none  Barriers to discharge: None    The patient is safe and appropriate to mobilize with RN staff outside of therapy sessions: The patient is safe to ambulate with RN assist x1 person.       Assessment:     Tanika Olivera is a 73 y.o. female admitted with a medical diagnosis of Type 1 dissection of thoracic aorta.  She presents with the following impairments/functional limitations: weakness, impaired endurance, impaired self care skills, impaired functional mobility, gait instability, impaired balance, impaired cardiopulmonary response to activity, decreased coordination. The patient was found sitting up in bedside chair, she reported dizziness and headache, RN aware, vitals stable throughout session. Patient recalled 0/3 sternal precautions at start of session, educated and cued patient to maintain precautions with mobility, recalled 0/3 precautions at end of session- continue to reinforce. Patient stood from bedside chair without assist, she ambulated 16' with HHA to sink and took a sitting rest break due to fatigue. She performed standing ADLs 4 min followed by another seated rest break before ambulating back to bedside chair. She remains highly motivated to return to OF and participate with therapy.     Rehab Prognosis: Good; patient would benefit from acute skilled PT services to address these deficits and reach maximum level of function.    Recent Surgery: Procedure(s) (LRB):  ASCENDING AORTA REPAIR (N/A) 2 Days Post-Op    Plan:     During this hospitalization, patient to be seen 5 x/week to address the identified rehab impairments via gait training, therapeutic exercises, therapeutic activities,  "neuromuscular re-education and progress toward the following goals:    Plan of Care Expires:  07/28/24    Subjective     Chief Complaint: "I did more than just walk 4 miles a day, I lifted weights at the gym, I went to the gym 5 days a week"  Patient/Family Comments/goals: "When can I get back to lifting weights", educated on precautions and general timeline for restrictions, waiting for MD clearance prior to weightlifting  Pain/Comfort:  Pain Rating 1: 0/10      Objective:     Communicated with RN prior to session.  Patient found up in chair with arterial line, blood pressure cuff, central line, chest tube, hargrove catheter, pulse ox (continuous), telemetry, peripheral IV, oxygen upon PT entry to room.  at bedside, he stepped out during session and returned at the end of session. OT and OT student present during session.     General Precautions: Standard, fall, sternal  Orthopedic Precautions: N/A  Braces: N/A  Respiratory Status: Nasal cannula, flow 2 L/min     Functional Mobility:    Bed Mobility  Deferred, up in chair at start/end of session    Transfers Sit to Stand:  contact guard assist from bedside chair, 3 reps, cued for scooting to edge of seat, cued not to use UE, cued for rocking to initiate t/f   Gait  Gait Distance: 16 ft x2 with HHA, chair follow, seated rest break between trials  Assistance Level: contact guard assist   Description: impaired weight shift, slow deliberate pace, short shuffling steps, kyphotic posture, uneven foot placement          AM-PAC 6 CLICK MOBILITY  Turning over in bed (including adjusting bedclothes, sheets and blankets)?: 3  Sitting down on and standing up from a chair with arms (e.g., wheelchair, bedside commode, etc.): 3  Moving from lying on back to sitting on the side of the bed?: 3  Moving to and from a bed to a chair (including a wheelchair)?: 3  Need to walk in hospital room?: 3  Climbing 3-5 steps with a railing?: 3  Basic Mobility Total Score: 18       Treatment " & Education:  Patient and spouse educated on:  -role of therapy  -goals of session  -PT POC  -benefits of out of bed mobility and consequences of immobility  -calling for staff assist to mobilize safely  -sternal precautions (no push/pull/ >5lbs), patient recalled 0/3 precautions at start and end of session, cued patient to maintain precautions with gait and sit to stand, spouse recalled 3/3 precautions  Patient agreeable to mobilize with therapy.      Gait training: cued for upright posture, reciprocal strides, pacing for energy conservation     Seated therex to improve LE strength and activity tolerance, cued for full ROM and eccentric lowering:  -LAQ, PRO 10 ea  -Marching, PRO 10 ea  -AP, PRO 15 ea  Encouraged patient to perform 10 reps ea/hour, verbalized understanding and agreement.      Patient performed standing ADLs  4 minutes with contact guard assist  and unilateral UE support on sink, cued patient to avoid leaning forearms on sink for support. Patient with wide RACHEL. Performed standing weight shift within RACHEL. No loss of balance.      Patient encouraged to ambulate, sit up in chair 3x/day to prevent deconditioning during hospitalization. Patient verbalized understanding and agreement to mobilize only with RN assist for safety.       Patient left up in chair with all lines intact, call button in reach, RN notified, and spouse present..    GOALS:   Multidisciplinary Problems       Physical Therapy Goals          Problem: Physical Therapy    Goal Priority Disciplines Outcome Goal Variances Interventions   Physical Therapy Goal     PT, PT/OT Progressing     Description: Goals to be met by: 24     Patient will increase functional independence with mobility by performin. Supine to sit with [place]  2. Sit to supine with [place]  3. Sit to stand transfer with Supervision  4. Bed to chair transfer with Supervision using No Assistive Device  5. Gait  x 250 feet with Supervision using No  Assistive Device.                          Time Tracking:     PT Received On: 06/29/24  PT Start Time: 1308     PT Stop Time: 1334  PT Total Time (min): 26 min     Billable Minutes: Gait Training 15 and Therapeutic Activity 11    Treatment Type: Treatment  PT/PTA: PT     Number of PTA visits since last PT visit: 0     06/29/2024

## 2024-06-29 NOTE — PLAN OF CARE
NAEON. VSS. 5 L/min nasal canula. Cardene gtt off & on throughout the night to maintain MAP 70-80 & SBP < 120. Pt has minimal complaints of pain, relieved with PRN pain meds. 265cc UOP per hargrove cath, team aware. 85cc serosang drainage from Lt & Rt mediastinal chest tubes. Electrolytes replaced per orders. OOBTC this AM, 1 per assist, pt tolerated well but complains of moderate dizziness with movement.

## 2024-06-29 NOTE — ASSESSMENT & PLAN NOTE
BG goal: 140-180 given age  No known hx of DM.  S/p aortic dissection repair.  On IIP.  Bg stable, titrated off early this morning.  Will monitor on correction    - Continue Novolog Low dose correction with ISF 50 starting at 150    - POCT Glucose before meals and at bedtime  - Hypoglycemia protocol in place      ** Please notify Endocrine for any change and/or advance in diet**  ** Please call Endocrine for any BG related issues **     Discharge Planning:   TBD. Please notify endocrinology prior to discharge.

## 2024-06-29 NOTE — PROGRESS NOTES
"[facility]  Endocrinology  Progress Note    Admit Date: 2024     Reason for Consult: Management of Hyperglycemia     Surgical Procedure and Date: Emergent repair of type A aortic dissection     No known hx of DM and not on meds for DM    HPI:   Patient is a 73 y.o. female with PMH of HTN, HLD, anxiety disorder, history of hepatitis C s/p successful treatment in 2018, osteopenia who presents as a transfer from [facility] for evaluation by cardiothoracic surgery for a Type A Aortic dissection.  CT showed  Aneurysmal dilation of the visualized ascending thoracic aorta with partially thrombosed ascending aortic dissection (Constantin type A). Dissection extending into the right brachiocephalic, and proximal most right subclavian and right vertebral arteries. Continuation of the dissection into the right common carotid, carotid bulb and proximal most right internal/external carotid arteries. S/p emergent repair.  Endocrine consulted for bg management      Interval HPI:   No acute events overnight. Patient in room 59132 CVICU/37110 CVICU A. Blood glucose stable. BG at goal on current insulin regimen (SSI ). Steroid use- None .   2 Days Post-Op  Renal function- Normal   Vasopressors-  None     Diet Cardiac Fluid - 1500mL    Eatin%  Nausea: No  Hypoglycemia and intervention: No  Fever: No  TPN and/or TF: No    BP (!) 108/55   Pulse (!) 56   Temp 98.4 °F (36.9 °C) (Oral)   Resp 17   Ht 4' 11" (1.499 m)   Wt 56.2 kg (124 lb)   SpO2 95%   BMI 25.04 kg/m²     Labs Reviewed and Include    Recent Labs   Lab 24  0115   *   CALCIUM 8.1*   ALBUMIN 2.7*   PROT 5.1*      K 3.4*   CO2 26      BUN 13   CREATININE 0.7   ALKPHOS 41*   ALT 14   AST 39   BILITOT 0.5     Lab Results   Component Value Date    WBC 12.37 2024    HGB 8.9 (L) 2024    HCT 26.7 (L) 2024    MCV 92 2024     2024     No results for input(s): "TSH", " ""FREET4" in the last 168 hours.  No results found for: "HGBA1C"    Nutritional status:   Body mass index is 25.04 kg/m².  Lab Results   Component Value Date    ALBUMIN 2.7 (L) 06/29/2024    ALBUMIN 2.8 (L) 06/28/2024    ALBUMIN 2.7 (L) 06/27/2024     No results found for: "PREALBUMIN"    Estimated Creatinine Clearance: 56.8 mL/min (based on SCr of 0.7 mg/dL).    Accu-Checks  Recent Labs     06/28/24  0505 06/28/24  0522 06/28/24  0558 06/28/24  0616 06/28/24  0721 06/28/24  0817 06/28/24  1206 06/28/24  1726 06/28/24  2154 06/29/24  0114   POCTGLUCOSE 99 103 95 106 102 107 147* 112* 116* 122*       Current Medications and/or Treatments Impacting Glycemic Control  Immunotherapy:    Immunosuppressants       None          Steroids:   Hormones (From admission, onward)      None          Pressors:    Autonomic Drugs (From admission, onward)      None          Hyperglycemia/Diabetes Medications:   Antihyperglycemics (From admission, onward)      Start     Stop Route Frequency Ordered    06/28/24 0930  insulin aspart U-100 pen 0-5 Units         -- SubQ Every 4 hours PRN 06/28/24 0830            ASSESSMENT and PLAN    Cardiac/Vascular  * Type 1 dissection of thoracic aorta  Managed per primary team  Optimize bg control        Hyperlipidemia    On statin  Managed by primary    Endocrine  Stress hyperglycemia  BG goal: 140-180 given age  No known hx of DM.  S/p aortic dissection repair.   Bg stable. Will monitor on correction    - Continue Novolog Low dose correction with ISF 50 starting at 150    - POCT Glucose before meals and at bedtime  - Hypoglycemia protocol in place      ** Please notify Endocrine for any change and/or advance in diet**  ** Please call Endocrine for any BG related issues **     Discharge Planning:   TBD. Please notify endocrinology prior to discharge.            Art Hester PA-C  Endocrinology  [facility] - Surgical Intensive Care  "

## 2024-06-29 NOTE — SUBJECTIVE & OBJECTIVE
"Interval HPI:   No acute events overnight. Patient in room 96070 CVICU/65567 CVICU A. Blood glucose stable. BG at goal on current insulin regimen (SSI ). Steroid use- None .   2 Days Post-Op  Renal function- Normal   Vasopressors-  None     Diet Cardiac Fluid - 1500mL    Eatin%  Nausea: No  Hypoglycemia and intervention: No  Fever: No  TPN and/or TF: No    BP (!) 108/55   Pulse (!) 56   Temp 98.4 °F (36.9 °C) (Oral)   Resp 17   Ht 4' 11" (1.499 m)   Wt 56.2 kg (124 lb)   SpO2 95%   BMI 25.04 kg/m²     Labs Reviewed and Include    Recent Labs   Lab 24  0115   *   CALCIUM 8.1*   ALBUMIN 2.7*   PROT 5.1*      K 3.4*   CO2 26      BUN 13   CREATININE 0.7   ALKPHOS 41*   ALT 14   AST 39   BILITOT 0.5     Lab Results   Component Value Date    WBC 12.37 2024    HGB 8.9 (L) 2024    HCT 26.7 (L) 2024    MCV 92 2024     2024     No results for input(s): "TSH", "FREET4" in the last 168 hours.  No results found for: "HGBA1C"    Nutritional status:   Body mass index is 25.04 kg/m².  Lab Results   Component Value Date    ALBUMIN 2.7 (L) 2024    ALBUMIN 2.8 (L) 2024    ALBUMIN 2.7 (L) 2024     No results found for: "PREALBUMIN"    Estimated Creatinine Clearance: 56.8 mL/min (based on SCr of 0.7 mg/dL).    Accu-Checks  Recent Labs     24  0505 24  0522 24  0558 24  0616 24  0721 24  0817 24  1206 24  1726 24  2154 24  0114   POCTGLUCOSE 99 103 95 106 102 107 147* 112* 116* 122*       Current Medications and/or Treatments Impacting Glycemic Control  Immunotherapy:    Immunosuppressants       None          Steroids:   Hormones (From admission, onward)      None          Pressors:    Autonomic Drugs (From admission, onward)      None          Hyperglycemia/Diabetes Medications:   Antihyperglycemics (From admission, onward)      Start     Stop Route Frequency Ordered    24 0930  " insulin aspart U-100 pen 0-5 Units         -- SubQ Every 4 hours PRN 06/28/24 0824

## 2024-06-30 LAB
ALBUMIN SERPL BCP-MCNC: 2.4 G/DL (ref 3.5–5.2)
ALP SERPL-CCNC: 51 U/L (ref 55–135)
ALT SERPL W/O P-5'-P-CCNC: 12 U/L (ref 10–44)
ANION GAP SERPL CALC-SCNC: 9 MMOL/L (ref 8–16)
APTT PPP: 29 SEC (ref 21–32)
AST SERPL-CCNC: 31 U/L (ref 10–40)
BILIRUB SERPL-MCNC: 0.4 MG/DL (ref 0.1–1)
BUN SERPL-MCNC: 19 MG/DL (ref 8–23)
CALCIUM SERPL-MCNC: 8.2 MG/DL (ref 8.7–10.5)
CHLORIDE SERPL-SCNC: 100 MMOL/L (ref 95–110)
CO2 SERPL-SCNC: 25 MMOL/L (ref 23–29)
CREAT SERPL-MCNC: 0.6 MG/DL (ref 0.5–1.4)
ERYTHROCYTE [DISTWIDTH] IN BLOOD BY AUTOMATED COUNT: 13.2 % (ref 11.5–14.5)
EST. GFR  (NO RACE VARIABLE): >60 ML/MIN/1.73 M^2
GLUCOSE SERPL-MCNC: 114 MG/DL (ref 70–110)
HCT VFR BLD AUTO: 26.8 % (ref 37–48.5)
HGB BLD-MCNC: 8.9 G/DL (ref 12–16)
INR PPP: 1 (ref 0.8–1.2)
MAGNESIUM SERPL-MCNC: 2 MG/DL (ref 1.6–2.6)
MCH RBC QN AUTO: 30.7 PG (ref 27–31)
MCHC RBC AUTO-ENTMCNC: 33.2 G/DL (ref 32–36)
MCV RBC AUTO: 92 FL (ref 82–98)
OHS QRS DURATION: 74 MS
OHS QTC CALCULATION: 435 MS
PHOSPHATE SERPL-MCNC: 2.5 MG/DL (ref 2.7–4.5)
PLATELET # BLD AUTO: 175 K/UL (ref 150–450)
PMV BLD AUTO: 10.2 FL (ref 9.2–12.9)
POCT GLUCOSE: 119 MG/DL (ref 70–110)
POCT GLUCOSE: 126 MG/DL (ref 70–110)
POTASSIUM SERPL-SCNC: 3.8 MMOL/L (ref 3.5–5.1)
PROT SERPL-MCNC: 5.2 G/DL (ref 6–8.4)
PROTHROMBIN TIME: 11.1 SEC (ref 9–12.5)
RBC # BLD AUTO: 2.9 M/UL (ref 4–5.4)
SODIUM SERPL-SCNC: 134 MMOL/L (ref 136–145)
WBC # BLD AUTO: 11.19 K/UL (ref 3.9–12.7)

## 2024-06-30 PROCEDURE — 25000003 PHARM REV CODE 250

## 2024-06-30 PROCEDURE — 27000221 HC OXYGEN, UP TO 24 HOURS

## 2024-06-30 PROCEDURE — 99232 SBSQ HOSP IP/OBS MODERATE 35: CPT | Mod: ,,, | Performed by: PHYSICIAN ASSISTANT

## 2024-06-30 PROCEDURE — 84100 ASSAY OF PHOSPHORUS: CPT | Performed by: THORACIC SURGERY (CARDIOTHORACIC VASCULAR SURGERY)

## 2024-06-30 PROCEDURE — 94761 N-INVAS EAR/PLS OXIMETRY MLT: CPT

## 2024-06-30 PROCEDURE — 25000003 PHARM REV CODE 250: Performed by: STUDENT IN AN ORGANIZED HEALTH CARE EDUCATION/TRAINING PROGRAM

## 2024-06-30 PROCEDURE — 20600001 HC STEP DOWN PRIVATE ROOM

## 2024-06-30 PROCEDURE — 99900035 HC TECH TIME PER 15 MIN (STAT)

## 2024-06-30 PROCEDURE — 83735 ASSAY OF MAGNESIUM: CPT | Performed by: THORACIC SURGERY (CARDIOTHORACIC VASCULAR SURGERY)

## 2024-06-30 PROCEDURE — 80053 COMPREHEN METABOLIC PANEL: CPT | Performed by: STUDENT IN AN ORGANIZED HEALTH CARE EDUCATION/TRAINING PROGRAM

## 2024-06-30 PROCEDURE — 63600175 PHARM REV CODE 636 W HCPCS

## 2024-06-30 PROCEDURE — 85610 PROTHROMBIN TIME: CPT | Performed by: STUDENT IN AN ORGANIZED HEALTH CARE EDUCATION/TRAINING PROGRAM

## 2024-06-30 PROCEDURE — 25000003 PHARM REV CODE 250: Performed by: ANESTHESIOLOGY

## 2024-06-30 PROCEDURE — 85730 THROMBOPLASTIN TIME PARTIAL: CPT | Performed by: STUDENT IN AN ORGANIZED HEALTH CARE EDUCATION/TRAINING PROGRAM

## 2024-06-30 PROCEDURE — 85027 COMPLETE CBC AUTOMATED: CPT | Performed by: STUDENT IN AN ORGANIZED HEALTH CARE EDUCATION/TRAINING PROGRAM

## 2024-06-30 PROCEDURE — 99233 SBSQ HOSP IP/OBS HIGH 50: CPT | Mod: GC,,, | Performed by: ANESTHESIOLOGY

## 2024-06-30 RX ORDER — METOPROLOL TARTRATE 25 MG/1
25 TABLET, FILM COATED ORAL 3 TIMES DAILY
Status: DISCONTINUED | OUTPATIENT
Start: 2024-06-30 | End: 2024-07-02 | Stop reason: HOSPADM

## 2024-06-30 RX ORDER — BISACODYL 10 MG/1
10 SUPPOSITORY RECTAL DAILY PRN
Status: DISCONTINUED | OUTPATIENT
Start: 2024-06-30 | End: 2024-07-02 | Stop reason: HOSPADM

## 2024-06-30 RX ADMIN — OXYCODONE 5 MG: 5 TABLET ORAL at 09:06

## 2024-06-30 RX ADMIN — Medication 6 MG: at 09:06

## 2024-06-30 RX ADMIN — DOCUSATE SODIUM 100 MG: 100 CAPSULE, LIQUID FILLED ORAL at 08:06

## 2024-06-30 RX ADMIN — DOCUSATE SODIUM 100 MG: 100 CAPSULE, LIQUID FILLED ORAL at 09:06

## 2024-06-30 RX ADMIN — FUROSEMIDE 20 MG: 10 INJECTION, SOLUTION INTRAVENOUS at 06:06

## 2024-06-30 RX ADMIN — ACETAMINOPHEN 1000 MG: 500 TABLET ORAL at 06:06

## 2024-06-30 RX ADMIN — ASPIRIN 325 MG ORAL TABLET 325 MG: 325 PILL ORAL at 08:06

## 2024-06-30 RX ADMIN — POLYETHYLENE GLYCOL 3350 17 G: 17 POWDER, FOR SOLUTION ORAL at 08:06

## 2024-06-30 RX ADMIN — ACETAMINOPHEN 1000 MG: 500 TABLET ORAL at 09:06

## 2024-06-30 RX ADMIN — POTASSIUM & SODIUM PHOSPHATES POWDER PACK 280-160-250 MG 2 PACKET: 280-160-250 PACK at 08:06

## 2024-06-30 RX ADMIN — FAMOTIDINE 20 MG: 20 TABLET ORAL at 08:06

## 2024-06-30 RX ADMIN — POTASSIUM BICARBONATE 50 MEQ: 978 TABLET, EFFERVESCENT ORAL at 06:06

## 2024-06-30 RX ADMIN — METOPROLOL TARTRATE 25 MG: 25 TABLET, FILM COATED ORAL at 08:06

## 2024-06-30 RX ADMIN — POTASSIUM & SODIUM PHOSPHATES POWDER PACK 280-160-250 MG 2 PACKET: 280-160-250 PACK at 06:06

## 2024-06-30 RX ADMIN — FUROSEMIDE 20 MG: 10 INJECTION, SOLUTION INTRAVENOUS at 09:06

## 2024-06-30 RX ADMIN — METOPROLOL TARTRATE 25 MG: 25 TABLET, FILM COATED ORAL at 09:06

## 2024-06-30 RX ADMIN — ATORVASTATIN CALCIUM 40 MG: 40 TABLET, FILM COATED ORAL at 08:06

## 2024-06-30 RX ADMIN — FAMOTIDINE 20 MG: 20 TABLET ORAL at 09:06

## 2024-06-30 RX ADMIN — METOPROLOL TARTRATE 25 MG: 25 TABLET, FILM COATED ORAL at 03:06

## 2024-06-30 RX ADMIN — BISACODYL 10 MG: 10 SUPPOSITORY RECTAL at 08:06

## 2024-06-30 NOTE — SUBJECTIVE & OBJECTIVE
"Interval HPI:   No acute events overnight. Patient in room 52735 CVICU/17008 CVICU A. Blood glucose stable. BG at goal on current insulin regimen (SSI ). Steroid use- None .   3 Days Post-Op  Renal function- Normal   Vasopressors-  None     Diet Cardiac Fluid - 1500mL     Eatin%  Nausea: No  Hypoglycemia and intervention: No  Fever: No  TPN and/or TF: No    /67 (BP Location: Right arm, Patient Position: Lying)   Pulse 65   Temp 98.4 °F (36.9 °C) (Oral)   Resp 19   Ht 4' 11" (1.499 m)   Wt 55.3 kg (122 lb)   SpO2 (!) 90%   BMI 24.64 kg/m²     Labs Reviewed and Include    Recent Labs   Lab 24  0240   *   CALCIUM 8.2*   ALBUMIN 2.4*   PROT 5.2*   *   K 3.8   CO2 25      BUN 19   CREATININE 0.6   ALKPHOS 51*   ALT 12   AST 31   BILITOT 0.4     Lab Results   Component Value Date    WBC 11.19 2024    HGB 8.9 (L) 2024    HCT 26.8 (L) 2024    MCV 92 2024     2024     No results for input(s): "TSH", "FREET4" in the last 168 hours.  No results found for: "HGBA1C"    Nutritional status:   Body mass index is 24.64 kg/m².  Lab Results   Component Value Date    ALBUMIN 2.4 (L) 2024    ALBUMIN 2.7 (L) 2024    ALBUMIN 2.8 (L) 2024     No results found for: "PREALBUMIN"    Estimated Creatinine Clearance: 66.3 mL/min (based on SCr of 0.6 mg/dL).    Accu-Checks  Recent Labs     24  0522 24  0558 24  0616 24  0721 24  0817 24  1206 24  1726 24  2154 24  0114 245   POCTGLUCOSE 103 95 106 102 107 147* 112* 116* 122* 110       Current Medications and/or Treatments Impacting Glycemic Control  Immunotherapy:    Immunosuppressants       None          Steroids:   Hormones (From admission, onward)      Start     Stop Route Frequency Ordered    24  melatonin tablet 6 mg         -- Oral Nightly PRN 24          Pressors:    Autonomic Drugs (From admission, " onward)      None          Hyperglycemia/Diabetes Medications:   Antihyperglycemics (From admission, onward)      Start     Stop Route Frequency Ordered    06/29/24 0932  insulin aspart U-100 pen 0-5 Units         -- SubQ Before meals & nightly PRN 06/29/24 0832

## 2024-06-30 NOTE — PROGRESS NOTES
[facility]  Critical Care - Surgery  Progress Note    Patient Name: Tanika Olivera  MRN: [id number]  Admission Date: 6/25/2024  Hospital Length of Stay: 5 days  Code Status: Prior  Attending Provider: Jean Paul Sharma MD  Primary Care Provider: Esdras Green MD   Principal Problem: Type 1 dissection of thoracic aorta    Subjective:     Hospital/ICU Course:  No notes on file    Interval History/Significant Events: NAEON. Hypertensive overnight with MAP 90-100s. Given Metoprolol with some improvement. No cardene needed overnight. Pain well controlled. UOP 2.2 L yesterday. UOP -2.0 L. CT output with 210 cc total. Down to 2L NC. Sitting in chair resting comfortably. Dizziness resolved.     Follow-up For: Procedure(s) (LRB):  ASCENDING AORTA REPAIR (N/A)    Post-Operative Day: 3 Days Post-Op    Objective:     Vital Signs (Most Recent):  Temp: 98.4 °F (36.9 °C) (06/29/24 2300)  Pulse: 73 (06/30/24 0645)  Resp: (!) 36 (06/30/24 0645)  BP: (!) 146/66 (06/30/24 0600)  SpO2: (!) 91 % (06/30/24 0645) Vital Signs (24h Range):  Temp:  [97.8 °F (36.6 °C)-98.4 °F (36.9 °C)] 98.4 °F (36.9 °C)  Pulse:  [] 73  Resp:  [10-36] 36  SpO2:  [88 %-100 %] 91 %  BP: (104-155)/(51-70) 146/66  Arterial Line BP: (100-160)/(45-91) 159/70     Weight: 55.3 kg (122 lb)  Body mass index is 24.64 kg/m².      Intake/Output Summary (Last 24 hours) at 6/30/2024 0840  Last data filed at 6/30/2024 0600  Gross per 24 hour   Intake 484.64 ml   Output 2255 ml   Net -1770.36 ml          Physical Exam  Constitutional:       Comments: Sitting up in chair   HENT:      Head: Normocephalic and atraumatic.   Eyes:      Extraocular Movements: Extraocular movements intact.      Conjunctiva/sclera: Conjunctivae normal.   Cardiovascular:      Rate and Rhythm: Normal rate and regular rhythm.      Comments: Right radial A-line  2 mediastinal CT w/ appropriate output  Pulmonary:      Effort: Pulmonary effort is normal. No respiratory  distress.   Abdominal:      General: There is no distension.      Palpations: Abdomen is soft.      Tenderness: There is no abdominal tenderness.   Genitourinary:     Comments: Michael  Musculoskeletal:         General: Normal range of motion.   Skin:     General: Skin is warm and dry.   Neurological:      General: No focal deficit present.      Mental Status: She is alert and oriented to person, place, and time.      Comments: CN II-XII grossly intact  Motor and sensation intact in bilateral upper and lower extremities   Psychiatric:         Mood and Affect: Mood normal.         Behavior: Behavior normal.            Vents:  Vent Mode: Spont (06/28/24 0715)  Set Rate: 18 BPM (06/28/24 0309)  Vt Set: 325 mL (06/28/24 0309)  Pressure Support: 5 cmH20 (06/28/24 0715)  PEEP/CPAP: 5 cmH20 (06/28/24 0715)  Oxygen Concentration (%): 40 (06/28/24 0800)  Peak Airway Pressure: 10 cmH20 (06/28/24 0715)  Plateau Pressure: 0 cmH20 (06/28/24 0715)  Total Ve: 6.36 L/m (06/28/24 0715)  Negative Inspiratory Force (cm H2O): 0 (06/28/24 0715)  F/VT Ratio<105 (RSBI): 242.86 (06/28/24 0715)    Lines/Drains/Airways       Drain  Duration                  Chest Tube 06/27/24 1227 Tube - 1 Right Mediastinal 32 Fr. 2 days         Chest Tube 06/27/24 1228 Tube - 2 Left Mediastinal 32 Fr. 2 days         Urethral Catheter 06/27/24 0900 Non-latex;Straight-tip;Temperature probe 16 Fr. 2 days              Arterial Line  Duration             Arterial Line 06/27/24 0903 Left Radial 2 days              Line  Duration                  Pacer Wires 06/27/24 1056 2 days              Peripheral Intravenous Line  Duration                  Peripheral IV - Single Lumen 06/25/24 1432 18 G Anterior;Left Forearm 4 days         Peripheral IV - Single Lumen 06/27/24 18 G Left Wrist 3 days                    Significant Labs:    CBC/Anemia Profile:  Recent Labs   Lab 06/28/24  0920 06/29/24  0115 06/30/24  0240   WBC  --  12.37 11.19   HGB  --  8.9* 8.9*   HCT 25*  26.7* 26.8*   PLT  --  167 175   MCV  --  92 92   RDW  --  13.4 13.2        Chemistries:  Recent Labs   Lab 06/29/24  0115 06/29/24  0941 06/30/24  0240     --  134*   K 3.4* 3.8 3.8     --  100   CO2 26  --  25   BUN 13  --  19   CREATININE 0.7  --  0.6   CALCIUM 8.1*  --  8.2*   ALBUMIN 2.7*  --  2.4*   PROT 5.1*  --  5.2*   BILITOT 0.5  --  0.4   ALKPHOS 41*  --  51*   ALT 14  --  12   AST 39  --  31   MG 2.1  --  2.0   PHOS 1.9*  --  2.5*       All pertinent labs within the past 24 hours have been reviewed.    Significant Imaging:  I have reviewed all pertinent imaging results/findings within the past 24 hours.  Assessment/Plan:     Cardiac/Vascular  * Type 1 dissection of thoracic aorta  Neuro/Psych:   - Sedation: None  - Pain:    - Tylenol 650 mg PRN, Oxycodone PRN   - Dizziness starting to resolve    - CTA H&N 6/26: evolving R PICA infarct and severely narrowed R vertebral artery from dissection flap.                Cardiac: POD 3 s/p emergent repair of Type A aortic dissection under hypothermic circulatory arrest, ascending and hemiarch replacement, and anterograde cerebral perfusion.  - BP Goal: MAP 75-85. No SBP goal.  - Pressors: None  - Rhythm: NSR. HR 61-69  - Cardene drip PRN. Off cardene 6/29.   - 2 mediastinal CT with appropriate output. Will pull today.  - Continue  mg    - 6/29: 12 beat run of Vtach with drop in BP 80/50s that recovered shortly after. Electrolytes repleted. EKG and ABG unremarkable. TTE ordered. Discussed with EP who will hold off on official consult. Likely due to electrolyte imbalance.       -TTE 6/26: Normal biventricular function with EF 65-70%. No signs of AR. Ascending aorta dilated to 4.1 cm.  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       Pulmonary:   - Goal SpO2 >92%  - On 2 L NC. Wean as tolerated. Cardene drip likely causing increasing O2 requirement due to  none shunting.   - ABG PRN      Renal:      Recent Labs   Lab 06/28/24  0340 06/29/24  0115 06/30/24  0240   BUN 10 13 19   CREATININE 0.7 0.7 0.6          - Michael; monitor I/Os    - UOP 265cc overnight. 2.2 L yesterday. Net -2.0. L.    - Lasix 20mg BID    - Trend BUN/Cr    FEN / GI:     - Daily CMP, PRN K/Mag/Phos per protocol     - Replace electrolytes as needed    - Nutrition: Cardiac diet    - Bowel Regimen: Miralax and docusate        ID:   - Afebrile. WBC stable  Recent Labs   Lab 06/28/24  0340 06/29/24  0115 06/30/24  0240   WBC 9.38 12.37 11.19         Heme/Onc:   - Hgb 13.7 pre-operatively  Recent Labs   Lab 06/28/24  0340 06/29/24  0115 06/30/24  0240   HGB 6.9* 8.9* 8.9*    167 175   APTT 21.7 27.4 29.0   INR 1.1 1.0 1.0       - CBC daily  - DVT ppx: Will start when appropriate  -  mg daily    Endocrine:   - CTS Goal -140  - HgbA1c: Not recorded  - SSI     PPx:   Feeding: Cardiac  Analgesia/Sedation: Tylenol; PRN Oxy  Thromboembolic Prevention: Will start when appropiate  HOB >30: Yes  Stress Ulcer: Famotidine  Glucose Control: 110-140     Lines/Drains/Airway:  Right radial arterial line  PIVs  R IJ CVC-pull today  Michael     Dispo/Code Status/Palliative:    - SICU   - Full Code            Critical care was time spent personally by me on the following activities: development of treatment plan with patient or surrogate and bedside caregivers, discussions with consultants, evaluation of patient's response to treatment, examination of patient, ordering and performing treatments and interventions, ordering and review of laboratory studies, ordering and review of radiographic studies, pulse oximetry, re-evaluation of patient's condition.  This critical care time did not overlap with that of any other provider or involve time for any procedures.     Florian Horta, DO  Critical Care - Surgery  [facility]

## 2024-06-30 NOTE — NURSING
SICU PLAN OF CARE    Dx: Type 1 dissection of thoracic aorta    Goals of Care: MAP 75-85    Vital Signs (last 12 hours):   Temp:  [98.4 °F (36.9 °C)]   Pulse:  [58-73]   Resp:  [15-36]   BP: (117-155)/(58-67)   SpO2:  [88 %-96 %]   Arterial Line BP: (100-159)/(54-91)      Neuro: AAOx4 and Follows commands       Cardiac: NSR      Respiratory:  2L Nasal Cannula , w/ humidification     Gtts: None      Urine Output: Urethral Catheter  1285 mL/shift      Drains: Chest Tube, total output 90mL/shift      Diet: Cardiac  and 1500mL Fluid Restriction       Labs/Accuchecks: ACHS acuchecks, Daily AM labs    Skin:  No new skin breakdown noted this shift.  Patient turned independently, bony prominences protected, and waffle mattress inflated/working correctly.   Romain Score: 19. If Romain Score is 16 or less, complete 4EYES note each shift.    Shift Events:  NAEON. VSS. See flowsheet for further assessment/details.  Family updated on current condition/plan of care, questions answered, and emotional support provided.  MD updated on current condition, vitals, labs, and gtts.

## 2024-06-30 NOTE — PROGRESS NOTES
"[facility]  Endocrinology  Progress Note    Admit Date: 2024     Reason for Consult: Management of Hyperglycemia     Surgical Procedure and Date: Emergent repair of type A aortic dissection     No known hx of DM and not on meds for DM    HPI:   Patient is a 73 y.o. female with PMH of HTN, HLD, anxiety disorder, history of hepatitis C s/p successful treatment in 2018, osteopenia who presents as a transfer from [facility] for evaluation by cardiothoracic surgery for a Type A Aortic dissection.  CT showed  Aneurysmal dilation of the visualized ascending thoracic aorta with partially thrombosed ascending aortic dissection (Constantin type A). Dissection extending into the right brachiocephalic, and proximal most right subclavian and right vertebral arteries. Continuation of the dissection into the right common carotid, carotid bulb and proximal most right internal/external carotid arteries. S/p emergent repair.  Endocrine consulted for bg management      Interval HPI:   No acute events overnight. Patient in room 83634 CVICU/55578 CVICU A. Blood glucose stable. BG at goal on current insulin regimen (SSI ). Steroid use- None .   3 Days Post-Op  Renal function- Normal   Vasopressors-  None     Diet Cardiac Fluid - 1500mL     Eatin%  Nausea: No  Hypoglycemia and intervention: No  Fever: No  TPN and/or TF: No    /67 (BP Location: Right arm, Patient Position: Lying)   Pulse 65   Temp 98.4 °F (36.9 °C) (Oral)   Resp 19   Ht 4' 11" (1.499 m)   Wt 55.3 kg (122 lb)   SpO2 (!) 90%   BMI 24.64 kg/m²     Labs Reviewed and Include    Recent Labs   Lab 24  0240   *   CALCIUM 8.2*   ALBUMIN 2.4*   PROT 5.2*   *   K 3.8   CO2 25      BUN 19   CREATININE 0.6   ALKPHOS 51*   ALT 12   AST 31   BILITOT 0.4     Lab Results   Component Value Date    WBC 11.19 2024    HGB 8.9 (L) 2024    HCT 26.8 (L) 2024    MCV 92 2024     " "06/30/2024     No results for input(s): "TSH", "FREET4" in the last 168 hours.  No results found for: "HGBA1C"    Nutritional status:   Body mass index is 24.64 kg/m².  Lab Results   Component Value Date    ALBUMIN 2.4 (L) 06/30/2024    ALBUMIN 2.7 (L) 06/29/2024    ALBUMIN 2.8 (L) 06/28/2024     No results found for: "PREALBUMIN"    Estimated Creatinine Clearance: 66.3 mL/min (based on SCr of 0.6 mg/dL).    Accu-Checks  Recent Labs     06/28/24  0522 06/28/24  0558 06/28/24  0616 06/28/24  0721 06/28/24  0817 06/28/24  1206 06/28/24  1726 06/28/24  2154 06/29/24  0114 06/29/24  2125   POCTGLUCOSE 103 95 106 102 107 147* 112* 116* 122* 110       Current Medications and/or Treatments Impacting Glycemic Control  Immunotherapy:    Immunosuppressants       None          Steroids:   Hormones (From admission, onward)      Start     Stop Route Frequency Ordered    06/29/24 2200  melatonin tablet 6 mg         -- Oral Nightly PRN 06/29/24 2207          Pressors:    Autonomic Drugs (From admission, onward)      None          Hyperglycemia/Diabetes Medications:   Antihyperglycemics (From admission, onward)      Start     Stop Route Frequency Ordered    06/29/24 0932  insulin aspart U-100 pen 0-5 Units         -- SubQ Before meals & nightly PRN 06/29/24 0832            ASSESSMENT and PLAN    Cardiac/Vascular  * Type 1 dissection of thoracic aorta  Managed per primary team  Optimize bg control        Hyperlipidemia    On statin  Managed by primary    Endocrine  Stress hyperglycemia  BG goal: 140-180 given age  No known hx of DM.  S/p aortic dissection repair.  BG stable.  Will monitor on correction. If remains stable, likely will sign off.    - Continue Novolog Low dose correction with ISF 50 starting at 150    - POCT Glucose before meals and at bedtime  - Hypoglycemia protocol in place      ** Please notify Endocrine for any change and/or advance in diet**  ** Please call Endocrine for any BG related issues **     Discharge " Planning:   TBD. Please notify endocrinology prior to discharge.        Addendum  Given bg stability Endocrine will sign off    Art Hester PA-C  Endocrinology  [facility]

## 2024-06-30 NOTE — ASSESSMENT & PLAN NOTE
BG goal: 140-180 given age  No known hx of DM.  S/p aortic dissection repair.  BG stable.  Will monitor on correction. If remains stable, likely will sign off.    - Continue Novolog Low dose correction with ISF 50 starting at 150    - POCT Glucose before meals and at bedtime  - Hypoglycemia protocol in place      ** Please notify Endocrine for any change and/or advance in diet**  ** Please call Endocrine for any BG related issues **     Discharge Planning:   TBD. Please notify endocrinology prior to discharge.

## 2024-06-30 NOTE — SUBJECTIVE & OBJECTIVE
Interval History/Significant Events: NAEON. Hypertensive overnight with MAP 90-100s. Given Metoprolol with some improvement. No cardene needed overnight. Pain well controlled. UOP 2.2 L yesterday. UOP -2.0 L. CT output with 210 cc total. Down to 2L NC. Sitting in chair resting comfortably. Dizziness resolved.     Follow-up For: Procedure(s) (LRB):  ASCENDING AORTA REPAIR (N/A)    Post-Operative Day: 3 Days Post-Op    Objective:     Vital Signs (Most Recent):  Temp: 98.4 °F (36.9 °C) (06/29/24 2300)  Pulse: 73 (06/30/24 0645)  Resp: (!) 36 (06/30/24 0645)  BP: (!) 146/66 (06/30/24 0600)  SpO2: (!) 91 % (06/30/24 0645) Vital Signs (24h Range):  Temp:  [97.8 °F (36.6 °C)-98.4 °F (36.9 °C)] 98.4 °F (36.9 °C)  Pulse:  [] 73  Resp:  [10-36] 36  SpO2:  [88 %-100 %] 91 %  BP: (104-155)/(51-70) 146/66  Arterial Line BP: (100-160)/(45-91) 159/70     Weight: 55.3 kg (122 lb)  Body mass index is 24.64 kg/m².      Intake/Output Summary (Last 24 hours) at 6/30/2024 0840  Last data filed at 6/30/2024 0600  Gross per 24 hour   Intake 484.64 ml   Output 2255 ml   Net -1770.36 ml          Physical Exam  Constitutional:       Comments: Sitting up in chair   HENT:      Head: Normocephalic and atraumatic.   Eyes:      Extraocular Movements: Extraocular movements intact.      Conjunctiva/sclera: Conjunctivae normal.   Cardiovascular:      Rate and Rhythm: Normal rate and regular rhythm.      Comments: Right radial A-line  2 mediastinal CT w/ appropriate output  Pulmonary:      Effort: Pulmonary effort is normal. No respiratory distress.   Abdominal:      General: There is no distension.      Palpations: Abdomen is soft.      Tenderness: There is no abdominal tenderness.   Genitourinary:     Comments: Michael  Musculoskeletal:         General: Normal range of motion.   Skin:     General: Skin is warm and dry.   Neurological:      General: No focal deficit present.      Mental Status: She is alert and oriented to person, place, and  time.      Comments: CN II-XII grossly intact  Motor and sensation intact in bilateral upper and lower extremities   Psychiatric:         Mood and Affect: Mood normal.         Behavior: Behavior normal.            Vents:  Vent Mode: Spont (06/28/24 0715)  Set Rate: 18 BPM (06/28/24 0309)  Vt Set: 325 mL (06/28/24 0309)  Pressure Support: 5 cmH20 (06/28/24 0715)  PEEP/CPAP: 5 cmH20 (06/28/24 0715)  Oxygen Concentration (%): 40 (06/28/24 0800)  Peak Airway Pressure: 10 cmH20 (06/28/24 0715)  Plateau Pressure: 0 cmH20 (06/28/24 0715)  Total Ve: 6.36 L/m (06/28/24 0715)  Negative Inspiratory Force (cm H2O): 0 (06/28/24 0715)  F/VT Ratio<105 (RSBI): 242.86 (06/28/24 0715)    Lines/Drains/Airways       Drain  Duration                  Chest Tube 06/27/24 1227 Tube - 1 Right Mediastinal 32 Fr. 2 days         Chest Tube 06/27/24 1228 Tube - 2 Left Mediastinal 32 Fr. 2 days         Urethral Catheter 06/27/24 0900 Non-latex;Straight-tip;Temperature probe 16 Fr. 2 days              Arterial Line  Duration             Arterial Line 06/27/24 0903 Left Radial 2 days              Line  Duration                  Pacer Wires 06/27/24 1056 2 days              Peripheral Intravenous Line  Duration                  Peripheral IV - Single Lumen 06/25/24 1432 18 G Anterior;Left Forearm 4 days         Peripheral IV - Single Lumen 06/27/24 18 G Left Wrist 3 days                    Significant Labs:    CBC/Anemia Profile:  Recent Labs   Lab 06/28/24  0920 06/29/24  0115 06/30/24  0240   WBC  --  12.37 11.19   HGB  --  8.9* 8.9*   HCT 25* 26.7* 26.8*   PLT  --  167 175   MCV  --  92 92   RDW  --  13.4 13.2        Chemistries:  Recent Labs   Lab 06/29/24  0115 06/29/24  0941 06/30/24  0240     --  134*   K 3.4* 3.8 3.8     --  100   CO2 26  --  25   BUN 13  --  19   CREATININE 0.7  --  0.6   CALCIUM 8.1*  --  8.2*   ALBUMIN 2.7*  --  2.4*   PROT 5.1*  --  5.2*   BILITOT 0.5  --  0.4   ALKPHOS 41*  --  51*   ALT 14  --  12   AST 39   --  31   MG 2.1  --  2.0   PHOS 1.9*  --  2.5*       All pertinent labs within the past 24 hours have been reviewed.    Significant Imaging:  I have reviewed all pertinent imaging results/findings within the past 24 hours.

## 2024-06-30 NOTE — ASSESSMENT & PLAN NOTE
Neuro/Psych:   - Sedation: None  - Pain:    - Tylenol 650 mg PRN, Oxycodone PRN   - Dizziness starting to resolve    - CTA H&N 6/26: evolving R PICA infarct and severely narrowed R vertebral artery from dissection flap.                Cardiac: POD 3 s/p emergent repair of Type A aortic dissection under hypothermic circulatory arrest, ascending and hemiarch replacement, and anterograde cerebral perfusion.  - BP Goal: MAP 75-85. No SBP goal.  - Pressors: None  - Rhythm: NSR. HR 61-69  - Cardene drip PRN. Off cardene 6/29.   - 2 mediastinal CT with appropriate output. Will pull today.  - Continue  mg    - 6/29: 12 beat run of Vtach with drop in BP 80/50s that recovered shortly after. Electrolytes repleted. EKG and ABG unremarkable. TTE ordered. Discussed with EP who will hold off on official consult. Likely due to electrolyte imbalance.       -TTE 6/26: Normal biventricular function with EF 65-70%. No signs of AR. Ascending aorta dilated to 4.1 cm.  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       Pulmonary:   - Goal SpO2 >92%  - On 2 L NC. Wean as tolerated. Cardene drip likely causing increasing O2 requirement due to shunting.   - ABG PRN      Renal:      Recent Labs   Lab 06/28/24 0340 06/29/24  0115 06/30/24  0240   BUN 10 13 19   CREATININE 0.7 0.7 0.6          - Michael; monitor I/Os    - UOP 265cc overnight. 2.2 L yesterday. Net -2.0. L.    - Lasix 20mg BID    - Trend BUN/Cr    FEN / GI:     - Daily CMP, PRN K/Mag/Phos per protocol     - Replace electrolytes as needed    - Nutrition: Cardiac diet    - Bowel Regimen: Miralax and docusate        ID:   - Afebrile. WBC stable  Recent Labs   Lab 06/28/24 0340 06/29/24  0115 06/30/24  0240   WBC 9.38 12.37 11.19         Heme/Onc:   - Hgb 13.7 pre-operatively  Recent Labs   Lab 06/28/24  0340 06/29/24  0115 06/30/24  0240   HGB 6.9* 8.9* 8.9*    167 175   APTT 21.7 27.4  29.0   INR 1.1 1.0 1.0       - CBC daily  - DVT ppx: Will start when appropriate  -  mg daily    Endocrine:   - CTS Goal -140  - HgbA1c: Not recorded  - SSI     PPx:   Feeding: Cardiac  Analgesia/Sedation: Tylenol; PRN Oxy  Thromboembolic Prevention: Will start when appropiate  HOB >30: Yes  Stress Ulcer: Famotidine  Glucose Control: 110-140     Lines/Drains/Airway:  Right radial arterial line  PIVs  R IJ CVC-pull today  Alec     Dispo/Code Status/Palliative:    - SICU   - Full Code

## 2024-07-01 PROBLEM — Z98.890 S/P AORTIC DISSECTION REPAIR: Status: ACTIVE | Noted: 2024-07-01

## 2024-07-01 PROBLEM — D62 ACUTE BLOOD LOSS ANEMIA: Status: ACTIVE | Noted: 2024-07-01

## 2024-07-01 LAB
ALBUMIN SERPL BCP-MCNC: 2.6 G/DL (ref 3.5–5.2)
ALP SERPL-CCNC: 61 U/L (ref 55–135)
ALT SERPL W/O P-5'-P-CCNC: 20 U/L (ref 10–44)
ANION GAP SERPL CALC-SCNC: 9 MMOL/L (ref 8–16)
APTT PPP: 27 SEC (ref 21–32)
AST SERPL-CCNC: 38 U/L (ref 10–40)
BILIRUB SERPL-MCNC: 0.5 MG/DL (ref 0.1–1)
BUN SERPL-MCNC: 13 MG/DL (ref 8–23)
CALCIUM SERPL-MCNC: 8.7 MG/DL (ref 8.7–10.5)
CHLORIDE SERPL-SCNC: 97 MMOL/L (ref 95–110)
CO2 SERPL-SCNC: 29 MMOL/L (ref 23–29)
CREAT SERPL-MCNC: 0.6 MG/DL (ref 0.5–1.4)
ERYTHROCYTE [DISTWIDTH] IN BLOOD BY AUTOMATED COUNT: 13.1 % (ref 11.5–14.5)
EST. GFR  (NO RACE VARIABLE): >60 ML/MIN/1.73 M^2
FINAL PATHOLOGIC DIAGNOSIS: NORMAL
GLUCOSE SERPL-MCNC: 109 MG/DL (ref 70–110)
GROSS: NORMAL
HCT VFR BLD AUTO: 29.8 % (ref 37–48.5)
HGB BLD-MCNC: 10.2 G/DL (ref 12–16)
Lab: NORMAL
MAGNESIUM SERPL-MCNC: 2 MG/DL (ref 1.6–2.6)
MCH RBC QN AUTO: 30.8 PG (ref 27–31)
MCHC RBC AUTO-ENTMCNC: 34.2 G/DL (ref 32–36)
MCV RBC AUTO: 90 FL (ref 82–98)
PHOSPHATE SERPL-MCNC: 3.9 MG/DL (ref 2.7–4.5)
PLATELET # BLD AUTO: 253 K/UL (ref 150–450)
PMV BLD AUTO: 9.9 FL (ref 9.2–12.9)
POTASSIUM SERPL-SCNC: 3.8 MMOL/L (ref 3.5–5.1)
PROT SERPL-MCNC: 5.9 G/DL (ref 6–8.4)
RBC # BLD AUTO: 3.31 M/UL (ref 4–5.4)
SODIUM SERPL-SCNC: 135 MMOL/L (ref 136–145)
WBC # BLD AUTO: 8.91 K/UL (ref 3.9–12.7)

## 2024-07-01 PROCEDURE — 84100 ASSAY OF PHOSPHORUS: CPT

## 2024-07-01 PROCEDURE — 36415 COLL VENOUS BLD VENIPUNCTURE: CPT | Performed by: STUDENT IN AN ORGANIZED HEALTH CARE EDUCATION/TRAINING PROGRAM

## 2024-07-01 PROCEDURE — 25000003 PHARM REV CODE 250

## 2024-07-01 PROCEDURE — 80053 COMPREHEN METABOLIC PANEL: CPT | Performed by: STUDENT IN AN ORGANIZED HEALTH CARE EDUCATION/TRAINING PROGRAM

## 2024-07-01 PROCEDURE — 83735 ASSAY OF MAGNESIUM: CPT

## 2024-07-01 PROCEDURE — 25000003 PHARM REV CODE 250: Performed by: STUDENT IN AN ORGANIZED HEALTH CARE EDUCATION/TRAINING PROGRAM

## 2024-07-01 PROCEDURE — 20600001 HC STEP DOWN PRIVATE ROOM

## 2024-07-01 PROCEDURE — 85730 THROMBOPLASTIN TIME PARTIAL: CPT | Performed by: STUDENT IN AN ORGANIZED HEALTH CARE EDUCATION/TRAINING PROGRAM

## 2024-07-01 PROCEDURE — 97116 GAIT TRAINING THERAPY: CPT

## 2024-07-01 PROCEDURE — 85027 COMPLETE CBC AUTOMATED: CPT | Performed by: STUDENT IN AN ORGANIZED HEALTH CARE EDUCATION/TRAINING PROGRAM

## 2024-07-01 RX ORDER — LISINOPRIL 20 MG/1
20 TABLET ORAL DAILY
Status: DISCONTINUED | OUTPATIENT
Start: 2024-07-02 | End: 2024-07-02 | Stop reason: HOSPADM

## 2024-07-01 RX ORDER — FUROSEMIDE 20 MG/1
20 TABLET ORAL 2 TIMES DAILY
Status: DISCONTINUED | OUTPATIENT
Start: 2024-07-01 | End: 2024-07-02 | Stop reason: HOSPADM

## 2024-07-01 RX ORDER — LISINOPRIL 10 MG/1
10 TABLET ORAL ONCE
Status: COMPLETED | OUTPATIENT
Start: 2024-07-01 | End: 2024-07-01

## 2024-07-01 RX ORDER — POTASSIUM CHLORIDE 20 MEQ/1
20 TABLET, EXTENDED RELEASE ORAL 2 TIMES DAILY
Status: DISCONTINUED | OUTPATIENT
Start: 2024-07-01 | End: 2024-07-02 | Stop reason: HOSPADM

## 2024-07-01 RX ORDER — LISINOPRIL 10 MG/1
10 TABLET ORAL DAILY
Status: DISCONTINUED | OUTPATIENT
Start: 2024-07-01 | End: 2024-07-01

## 2024-07-01 RX ADMIN — FUROSEMIDE 20 MG: 20 TABLET ORAL at 06:07

## 2024-07-01 RX ADMIN — ACETAMINOPHEN 1000 MG: 500 TABLET ORAL at 08:07

## 2024-07-01 RX ADMIN — ATORVASTATIN CALCIUM 40 MG: 40 TABLET, FILM COATED ORAL at 08:07

## 2024-07-01 RX ADMIN — LISINOPRIL 10 MG: 10 TABLET ORAL at 10:07

## 2024-07-01 RX ADMIN — Medication 6 MG: at 08:07

## 2024-07-01 RX ADMIN — POTASSIUM CHLORIDE 20 MEQ: 1500 TABLET, EXTENDED RELEASE ORAL at 08:07

## 2024-07-01 RX ADMIN — FUROSEMIDE 20 MG: 20 TABLET ORAL at 10:07

## 2024-07-01 RX ADMIN — METOPROLOL TARTRATE 25 MG: 25 TABLET, FILM COATED ORAL at 08:07

## 2024-07-01 RX ADMIN — FAMOTIDINE 20 MG: 20 TABLET ORAL at 08:07

## 2024-07-01 RX ADMIN — LISINOPRIL 10 MG: 10 TABLET ORAL at 01:07

## 2024-07-01 RX ADMIN — ASPIRIN 325 MG ORAL TABLET 325 MG: 325 PILL ORAL at 08:07

## 2024-07-01 RX ADMIN — METOPROLOL TARTRATE 25 MG: 25 TABLET, FILM COATED ORAL at 04:07

## 2024-07-01 RX ADMIN — DOCUSATE SODIUM 100 MG: 100 CAPSULE, LIQUID FILLED ORAL at 08:07

## 2024-07-01 RX ADMIN — POLYETHYLENE GLYCOL 3350 17 G: 17 POWDER, FOR SOLUTION ORAL at 08:07

## 2024-07-01 RX ADMIN — ACETAMINOPHEN 1000 MG: 500 TABLET ORAL at 05:07

## 2024-07-01 NOTE — PLAN OF CARE
Problem: Adult Inpatient Plan of Care  Goal: Plan of Care Review  Outcome: Progressing  Goal: Patient-Specific Goal (Individualized)  Outcome: Progressing  Goal: Absence of Hospital-Acquired Illness or Injury  Outcome: Progressing  Goal: Optimal Comfort and Wellbeing  Outcome: Progressing  Goal: Readiness for Transition of Care  Outcome: Progressing     Problem: Fall Injury Risk  Goal: Absence of Fall and Fall-Related Injury  Outcome: Progressing     Problem: Skin Injury Risk Increased  Goal: Skin Health and Integrity  Outcome: Progressing     Problem: Infection  Goal: Absence of Infection Signs and Symptoms  Outcome: Progressing     Problem: Wound  Goal: Optimal Coping  Outcome: Progressing  Goal: Optimal Functional Ability  Outcome: Progressing  Goal: Absence of Infection Signs and Symptoms  Outcome: Progressing  Goal: Improved Oral Intake  Outcome: Progressing  Goal: Optimal Pain Control and Function  Outcome: Progressing  Goal: Skin Health and Integrity  Outcome: Progressing  Goal: Optimal Wound Healing  Outcome: Progressing     Problem: Cardiovascular Surgery  Goal: Improved Activity Tolerance  Outcome: Progressing  Goal: Optimal Coping with Heart Surgery  Outcome: Progressing  Goal: Absence of Bleeding  Outcome: Progressing  Goal: Effective Bowel Elimination  Outcome: Progressing  Goal: Effective Cardiac Function  Outcome: Progressing  Goal: Optimal Cerebral Tissue Perfusion  Outcome: Progressing  Goal: Fluid and Electrolyte Balance  Outcome: Progressing  Goal: Blood Glucose Level Within Targeted Range  Outcome: Progressing  Goal: Absence of Infection Signs and Symptoms  Outcome: Progressing  Goal: Anesthesia/Sedation Recovery  Outcome: Progressing  Goal: Acceptable Pain Control  Outcome: Progressing  Goal: Nausea and Vomiting Relief  Outcome: Progressing  Goal: Effective Urinary Elimination  Outcome: Progressing  Goal: Effective Oxygenation and Ventilation  Outcome: Progressing

## 2024-07-01 NOTE — ASSESSMENT & PLAN NOTE
- Can be expected postoperatively   - CBC daily to monitor trends   - No need for replacement today

## 2024-07-01 NOTE — PT/OT/SLP PROGRESS
Physical Therapy Treatment    Patient Name:  Tanika Olivera   MRN:  [id number]  Admitting Diagnosis:  S/P aortic dissection repair   Recent Surgery: Procedure(s) (LRB):  ASCENDING AORTA REPAIR (N/A) 4 Days Post-Op  Admit Date: 6/25/2024  Length of Stay: 6 days    Recommendations:     Discharge Recommendations:  No Therapy Indicated    Discharge Equipment Recommendations: none   Barriers to discharge: None    Appropriate transfer level with nursing staff: Ambulatory with CGA-min A    Plan:     During this hospitalization, patient to be seen 5 x/week to address the identified rehab impairments via gait training, therapeutic activities, therapeutic exercises, neuromuscular re-education and progress towards the established goals.  Plan of Care Expires:  07/28/24  Plan of Care Reviewed with: patient    Assessment:     Tanika Olivera is a 73 y.o. female admitted with a medical diagnosis of S/P aortic dissection repair. Pt found upright in chair agreeable to therapy session. Pt demo'd improvements as she increased overall distance gait trained, but continues to report steady dizziness though BP WNL and requires increased rest breaks between gait trials. Pt also regressed from CGA to min A during last gait trial with x2 overt LOB with instability and NBOS noted. Pt able to recall 2/3 sternal precautions and required cueing for compliance for scooting in chair. Patient would benefit from skilled therapy services to maximize safety and independence, increase activity tolerance, decrease fall risk, decrease caregiver burden, improve QOL, improve patient's functional mobility, and decrease risk of contractures and pressure sores.     Problem List: weakness, impaired endurance, impaired self care skills, impaired functional mobility, gait instability, impaired balance, impaired cardiopulmonary response to activity.  Rehab Prognosis: Good; patient would benefit from acute skilled PT services to address these deficits and reach  "maximum level of function.      Goals:   Multidisciplinary Problems       Physical Therapy Goals          Problem: Physical Therapy    Goal Priority Disciplines Outcome Goal Variances Interventions   Physical Therapy Goal     PT, PT/OT Progressing     Description: Goals to be met by: 24     Patient will increase functional independence with mobility by performin. Supine to sit with [place]  2. Sit to supine with [place]  3. Sit to stand transfer with Supervision  4. Bed to chair transfer with Supervision using No Assistive Device  5. Gait  x 250 feet with Supervision using No Assistive Device.                          Subjective     RN notified prior to session. No one present upon PT entrance into room. Patient agreeable to PT treatment session.    Chief Complaint: "I just feel a little dizzy but that's okay I've been like this since I was up"  Patient/Family Comments/goals: increase mobility  Pain/Comfort:  Pain Rating 1: 0/10  Pain Rating Post-Intervention 1: 0/10      Objective:     Patient found up in chair with: telemetry   Cognition:   Alert and Cooperative  Patient is oriented to Person, Place, Time, Situation  General Precautions: Standard, Cardiac fall, sternal   Orthopedic Precautions:N/A   Braces: N/A   Body mass index is 23.33 kg/m².  Oxygen Device: Room Air  Vitals: BP (!) 140/65 (BP Location: Right arm, Patient Position: Sitting)   Pulse 65   Temp 97.3 °F (36.3 °C) (Oral)   Resp 20   Ht 4' 11" (1.499 m)   Wt 52.4 kg (115 lb 8.3 oz)   SpO2 (!) 93%   BMI 23.33 kg/m²     Outcome Measures:  AM-PAC 6 CLICK MOBILITY  Turning over in bed (including adjusting bedclothes, sheets and blankets)?: 3  Sitting down on and standing up from a chair with arms (e.g., wheelchair, bedside commode, etc.): 3  Moving from lying on back to sitting on the side of the bed?: 3  Moving to and from a bed to a chair (including a wheelchair)?: 3  Need to walk in hospital room?: 3  Climbing 3-5 steps " with a railing?: 3  Basic Mobility Total Score: 18     Functional Mobility:    Bed Mobility:   Pt found/returned to bedside chair    Transfers:   Sit <> Stand Transfer: contact guard assistance with no assistive device x3 trials from chair and x1 trial from toilet    Balance:  Standing:  Static: contact guard assistance  Dynamic: contact guard assistance      Gait:  Patient ambulated: 10', 26' 22' (seated rest break between trials)   Patient required: contact guard  Patient used:  no assistive device and hand-held assist   Gait Deviation(s): occasional unsteady gait, decreased step length, narrow base of support, flexed posture, decreased jaci, and decreased arm swing  all lines remained intact throughout ambulation trial  Comments: Patient with mild instability that increased as trials progressed. Pt with x2 LOB requiring min-mod A for correction and with increased lateral sway during last gait trial. Pt denied SOB during trials.     Education:  Time provided for education, counseling and discussion of health disposition in regards to patient's current status  All questions answered within PT scope of practice and to patient's satisfaction  PT role in POC to address current functional deficits  Call nursing/pct to transfer to chair/use bathroom. Pt stated understanding.  Sternal Precautions: patient able to voice 2/3 precautions without assistance. Patient able to maintain precautions throughout session with min verbal cues.    Patient left up in chair with all lines intact, call button in reach, and PCT notified.    Time Tracking:     PT Received On: 07/01/24  PT Start Time: 0943     PT Stop Time: 1008  PT Total Time (min): 25 min     Billable Minutes:   Gait Training 23 minutes    Treatment Type: Treatment  PT/PTA: PT       7/1/2024

## 2024-07-01 NOTE — SUBJECTIVE & OBJECTIVE
Interval History: EBONIE. MAPS  yesterday, 104 today. Add lisinopril 10 mg for BP control. She complains of chronic dizziness that seems to improve. Net neg 2 L / 24 hrs and euvolemic on exam, transition lasix to 20 mg PO BID. Last med chest tube removed. Possible discharge tomorrow if BP is controled. Encourage increase ambulation.     Review of Systems   Constitutional: Negative for malaise/fatigue.   Cardiovascular:  Negative for chest pain, claudication, dyspnea on exertion, irregular heartbeat, leg swelling and palpitations.   Respiratory:  Negative for cough and shortness of breath.    Hematologic/Lymphatic: Negative for bleeding problem.   Gastrointestinal:  Negative for abdominal pain.   Genitourinary:  Negative for dysuria.   Neurological:  Positive for dizziness. Negative for headaches and weakness.     Medications:  Continuous Infusions:  Scheduled Meds:   acetaminophen  1,000 mg Oral Q8H    aspirin  325 mg Oral Daily    atorvastatin  40 mg Oral Daily    docusate sodium  100 mg Oral BID    famotidine  20 mg Oral BID    furosemide  20 mg Oral BID    lisinopriL  10 mg Oral Daily    metoprolol tartrate  25 mg Oral TID    polyethylene glycol  17 g Oral Daily    potassium chloride  20 mEq Oral BID     PRN Meds:  Current Facility-Administered Medications:     0.9%  NaCl infusion (for blood administration), , Intravenous, Q24H PRN    albuterol-ipratropium, 3 mL, Nebulization, Q4H PRN    bisacodyL, 10 mg, Rectal, Daily PRN    dextrose 10%, 12.5 g, Intravenous, PRN    dextrose 10%, 25 g, Intravenous, PRN    melatonin, 6 mg, Oral, Nightly PRN    ondansetron, 4 mg, Intravenous, Q6H PRN    oxyCODONE, 5 mg, Oral, Q4H PRN    oxyCODONE, 10 mg, Oral, Q4H PRN    prochlorperazine, 5 mg, Intravenous, Q6H PRN     Objective:     Vital Signs (Most Recent):  Temp: 98 °F (36.7 °C) (07/01/24 0742)  Pulse: 67 (07/01/24 1015)  Resp: 20 (07/01/24 0742)  BP: (!) 148/72 (07/01/24 1015)  SpO2: (!) 92 % (07/01/24 0742) Vital Signs  (24h Range):  Temp:  [97.2 °F (36.2 °C)-98.7 °F (37.1 °C)] 98 °F (36.7 °C)  Pulse:  [61-80] 67  Resp:  [16-31] 20  SpO2:  [89 %-98 %] 92 %  BP: (122-158)/(56-84) 148/72     Weight: 52.4 kg (115 lb 8.3 oz)  Body mass index is 23.33 kg/m².    SpO2: (!) 92 %       Intake/Output - Last 3 Shifts         06/29 0700 06/30 0659 06/30 0700 07/01 0659 07/01 0700 07/02 0659    P.O. 480 800     I.V. (mL/kg) 24.4 (0.4)      IV Piggyback 5      Total Intake(mL/kg) 509.4 (9.2) 800 (14.5)     Urine (mL/kg/hr) 2255 (1.7) 1950 (1.5)     Drains       Stool  0     Chest Tube 210 30     Total Output 2465 1980     Net -1955.6 -1180            Urine Occurrence  3 x     Stool Occurrence  2 x             Lines/Drains/Airways       Drain  Duration                  Chest Tube 06/27/24 1228 Tube - 2 Left Mediastinal 32 Fr. 3 days              Peripheral Intravenous Line  Duration                  Peripheral IV - Single Lumen 06/25/24 1432 18 G Anterior;Left Forearm 5 days         Peripheral IV - Single Lumen 06/27/24 18 G Left Wrist 4 days                     Physical Exam  Constitutional:       Appearance: Normal appearance.   HENT:      Head: Normocephalic.   Eyes:      Pupils: Pupils are equal, round, and reactive to light.   Cardiovascular:      Rate and Rhythm: Normal rate and regular rhythm.      Pulses: Normal pulses.   Pulmonary:      Effort: Pulmonary effort is normal.   Abdominal:      General: Abdomen is flat. Bowel sounds are normal.      Palpations: Abdomen is soft.   Musculoskeletal:         General: Normal range of motion.   Skin:     General: Skin is warm and dry.      Comments: MSI CDI   Neurological:      General: No focal deficit present.      Mental Status: She is alert.            Significant Labs:  CBC:   Recent Labs   Lab 07/01/24  0457   WBC 8.91   RBC 3.31*   HGB 10.2*   HCT 29.8*      MCV 90   MCH 30.8   MCHC 34.2     CMP:   Recent Labs   Lab 07/01/24  0457      CALCIUM 8.7   ALBUMIN 2.6*   PROT 5.9*    *   K 3.8   CO2 29   CL 97   BUN 13   CREATININE 0.6   ALKPHOS 61   ALT 20   AST 38   BILITOT 0.5     Coagulation:   Recent Labs   Lab 06/30/24  0240 07/01/24 0457   INR 1.0  --    APTT 29.0 27.0     LFTs:   Recent Labs   Lab 07/01/24 0457   ALT 20   AST 38   ALKPHOS 61   BILITOT 0.5   PROT 5.9*   ALBUMIN 2.6*       Significant Diagnostics:  I have reviewed all pertinent imaging results/findings within the past 24 hours.

## 2024-07-01 NOTE — ASSESSMENT & PLAN NOTE
Patient presented to outside hospital with subacute Type A aortic dissection. She was transferred to our facility and managed initially with impulse therapy given her stability. Now s/p emergent type A aortic dissection repair under DHCA, ascending and hemiarch replacement by Dr. Sharma on 6/27/2024    - Maintain sternal precautions   - ASA  - BB  - Statin  - Lasix  with scheduled potassium ordered   - Multimodal pain management   - Encourage ambulation  - PT/OT  - Cardiac diet with 1500 cc fluid restriction   - Bowel regimen in place   - pepcid  for ulcer prevention   -  med Chest tube put out 30 cc/24 hr- removed   - Monitor electrolytes to keep Mag above 2 and Potassium above 4  - OOBTC  - Encourage IS use

## 2024-07-01 NOTE — PROGRESS NOTES
[facility] - Cardiology Stepdown  Cardiothoracic Surgery  Progress Note    Patient Name: Tanika Olivera  MRN: [id number]  Admission Date: 6/25/2024  Hospital Length of Stay: 6 days  Code Status: Prior   Attending Physician: Jean Paul Sharma MD   Referring Provider: Sudhir Monge Jr.,*  Principal Problem:S/P aortic dissection repair            Subjective:     Post-Op Info:  Procedure(s) (LRB):  ASCENDING AORTA REPAIR (N/A)   4 Days Post-Op     Interval History: NAEON. MAPS  yesterday, 104 today. Will lisinopril 10 mg for BP control. She complains of chronic dizziness that seems to improve. Net neg 2 L / 24 hrs and euvolemic on exam, transition lasix to 20 mg PO BID. Last med chest tube removed. Possible discharge tomorrow if BP is controled. Encourage increase ambulation.     Review of Systems   Constitutional: Negative for malaise/fatigue.   Cardiovascular:  Negative for chest pain, claudication, dyspnea on exertion, irregular heartbeat, leg swelling and palpitations.   Respiratory:  Negative for cough and shortness of breath.    Hematologic/Lymphatic: Negative for bleeding problem.   Gastrointestinal:  Negative for abdominal pain.   Genitourinary:  Negative for dysuria.   Neurological:  Positive for dizziness. Negative for headaches and weakness.     Medications:  Continuous Infusions:  Scheduled Meds:   acetaminophen  1,000 mg Oral Q8H    aspirin  325 mg Oral Daily    atorvastatin  40 mg Oral Daily    docusate sodium  100 mg Oral BID    famotidine  20 mg Oral BID    furosemide  20 mg Oral BID    lisinopriL  10 mg Oral Daily    metoprolol tartrate  25 mg Oral TID    polyethylene glycol  17 g Oral Daily    potassium chloride  20 mEq Oral BID     PRN Meds:  Current Facility-Administered Medications:     0.9%  NaCl infusion (for blood administration), , Intravenous, Q24H PRN    albuterol-ipratropium, 3 mL, Nebulization, Q4H PRN    bisacodyL, 10 mg, Rectal, Daily PRN    dextrose 10%, 12.5 g, Intravenous, PRN     dextrose 10%, 25 g, Intravenous, PRN    melatonin, 6 mg, Oral, Nightly PRN    ondansetron, 4 mg, Intravenous, Q6H PRN    oxyCODONE, 5 mg, Oral, Q4H PRN    oxyCODONE, 10 mg, Oral, Q4H PRN    prochlorperazine, 5 mg, Intravenous, Q6H PRN     Objective:     Vital Signs (Most Recent):  Temp: 98 °F (36.7 °C) (07/01/24 0742)  Pulse: 67 (07/01/24 1015)  Resp: 20 (07/01/24 0742)  BP: (!) 148/72 (07/01/24 1015)  SpO2: (!) 92 % (07/01/24 0742) Vital Signs (24h Range):  Temp:  [97.2 °F (36.2 °C)-98.7 °F (37.1 °C)] 98 °F (36.7 °C)  Pulse:  [61-80] 67  Resp:  [16-31] 20  SpO2:  [89 %-98 %] 92 %  BP: (122-158)/(56-84) 148/72     Weight: 52.4 kg (115 lb 8.3 oz)  Body mass index is 23.33 kg/m².    SpO2: (!) 92 %       Intake/Output - Last 3 Shifts         06/29 0700 06/30 0659 06/30 0700 07/01 0659 07/01 0700  07/02 0659    P.O. 480 800     I.V. (mL/kg) 24.4 (0.4)      IV Piggyback 5      Total Intake(mL/kg) 509.4 (9.2) 800 (14.5)     Urine (mL/kg/hr) 2255 (1.7) 1950 (1.5)     Drains       Stool  0     Chest Tube 210 30     Total Output 2465 1980     Net -1955.6 -1180            Urine Occurrence  3 x     Stool Occurrence  2 x             Lines/Drains/Airways       Drain  Duration                  Chest Tube 06/27/24 1228 Tube - 2 Left Mediastinal 32 Fr. 3 days              Peripheral Intravenous Line  Duration                  Peripheral IV - Single Lumen 06/25/24 1432 18 G Anterior;Left Forearm 5 days         Peripheral IV - Single Lumen 06/27/24 18 G Left Wrist 4 days                     Physical Exam  Constitutional:       Appearance: Normal appearance.   HENT:      Head: Normocephalic.   Eyes:      Pupils: Pupils are equal, round, and reactive to light.   Cardiovascular:      Rate and Rhythm: Normal rate and regular rhythm.      Pulses: Normal pulses.   Pulmonary:      Effort: Pulmonary effort is normal.   Abdominal:      General: Abdomen is flat. Bowel sounds are normal.      Palpations: Abdomen is soft.   Musculoskeletal:          General: Normal range of motion.   Skin:     General: Skin is warm and dry.      Comments: MSI CDI   Neurological:      General: No focal deficit present.      Mental Status: She is alert.            Significant Labs:  CBC:   Recent Labs   Lab 07/01/24 0457   WBC 8.91   RBC 3.31*   HGB 10.2*   HCT 29.8*      MCV 90   MCH 30.8   MCHC 34.2     CMP:   Recent Labs   Lab 07/01/24 0457      CALCIUM 8.7   ALBUMIN 2.6*   PROT 5.9*   *   K 3.8   CO2 29   CL 97   BUN 13   CREATININE 0.6   ALKPHOS 61   ALT 20   AST 38   BILITOT 0.5     Coagulation:   Recent Labs   Lab 06/30/24  0240 07/01/24 0457   INR 1.0  --    APTT 29.0 27.0     LFTs:   Recent Labs   Lab 07/01/24 0457   ALT 20   AST 38   ALKPHOS 61   BILITOT 0.5   PROT 5.9*   ALBUMIN 2.6*       Significant Diagnostics:  I have reviewed all pertinent imaging results/findings within the past 24 hours.  Assessment/Plan:     * S/P aortic dissection repair  Patient presented to outside hospital with subacute Type A aortic dissection. She was transferred to our facility and managed initially with impulse therapy given her stability. Now s/p emergent type A aortic dissection repair under DHCA, ascending and hemiarch replacement by Dr. Sharma on 6/27/2024    - Maintain sternal precautions   - ASA  - BB  - Statin  - Lasix  with scheduled potassium ordered   - Multimodal pain management   - Encourage ambulation  - PT/OT  - Cardiac diet with 1500 cc fluid restriction   - Bowel regimen in place   - pepcid  for ulcer prevention   -  med Chest tube put out 30 cc/24 hr- removed   - Monitor electrolytes to keep Mag above 2 and Potassium above 4  - OOBTC  - Encourage IS use         Acute blood loss anemia  - Can be expected postoperatively   - CBC daily to monitor trends   - No need for replacement today        Stress hyperglycemia  Post operatively expected       Type 1 dissection of thoracic aorta  See/ sp aortic dissection repair     Anxiety disorder  Chronic  and stable     Hyperlipidemia  Statin     Hypertension  MAP   Lisinopril 10 mg daily         Mari Benitez PAUmaC  Cardiothoracic Surgery  [facility] - [facility] Step[facility]

## 2024-07-01 NOTE — NURSING
Plan of care reviewed with patient. Patient is AOX4 and VS stable. Patient remained free of falls and trauma, fall precautions are in place. Patient is ambulating with assistance; sternal precautions reinforced. Chest tube removed today, midsternal dressing changed per order. Patient has no complaints of pain. Patient has no questions at this time. Wheels are locked and the bed is in lowest position. The call bell is within reach. Telemetry is on.

## 2024-07-02 ENCOUNTER — DOCUMENTATION ONLY (OUTPATIENT)
Dept: CARDIOTHORACIC SURGERY | Facility: CLINIC | Age: 74
End: 2024-07-02
Payer: MEDICARE

## 2024-07-02 VITALS
BODY MASS INDEX: 22.76 KG/M2 | RESPIRATION RATE: 18 BRPM | TEMPERATURE: 98 F | SYSTOLIC BLOOD PRESSURE: 146 MMHG | HEIGHT: 59 IN | WEIGHT: 112.88 LBS | OXYGEN SATURATION: 95 % | HEART RATE: 84 BPM | DIASTOLIC BLOOD PRESSURE: 81 MMHG

## 2024-07-02 LAB
ALBUMIN SERPL BCP-MCNC: 2.7 G/DL (ref 3.5–5.2)
ALP SERPL-CCNC: 52 U/L (ref 55–135)
ALT SERPL W/O P-5'-P-CCNC: 19 U/L (ref 10–44)
ANION GAP SERPL CALC-SCNC: 6 MMOL/L (ref 8–16)
AST SERPL-CCNC: 35 U/L (ref 10–40)
BILIRUB SERPL-MCNC: 0.5 MG/DL (ref 0.1–1)
BUN SERPL-MCNC: 12 MG/DL (ref 8–23)
CALCIUM SERPL-MCNC: 8.8 MG/DL (ref 8.7–10.5)
CHLORIDE SERPL-SCNC: 99 MMOL/L (ref 95–110)
CO2 SERPL-SCNC: 29 MMOL/L (ref 23–29)
CREAT SERPL-MCNC: 0.6 MG/DL (ref 0.5–1.4)
ERYTHROCYTE [DISTWIDTH] IN BLOOD BY AUTOMATED COUNT: 13.2 % (ref 11.5–14.5)
EST. GFR  (NO RACE VARIABLE): >60 ML/MIN/1.73 M^2
GLUCOSE SERPL-MCNC: 108 MG/DL (ref 70–110)
HCT VFR BLD AUTO: 29.8 % (ref 37–48.5)
HGB BLD-MCNC: 9.8 G/DL (ref 12–16)
MAGNESIUM SERPL-MCNC: 2 MG/DL (ref 1.6–2.6)
MCH RBC QN AUTO: 30.1 PG (ref 27–31)
MCHC RBC AUTO-ENTMCNC: 32.9 G/DL (ref 32–36)
MCV RBC AUTO: 91 FL (ref 82–98)
PHOSPHATE SERPL-MCNC: 3.9 MG/DL (ref 2.7–4.5)
PLATELET # BLD AUTO: 310 K/UL (ref 150–450)
PMV BLD AUTO: 9.6 FL (ref 9.2–12.9)
POTASSIUM SERPL-SCNC: 4.7 MMOL/L (ref 3.5–5.1)
PROT SERPL-MCNC: 6 G/DL (ref 6–8.4)
RBC # BLD AUTO: 3.26 M/UL (ref 4–5.4)
SODIUM SERPL-SCNC: 134 MMOL/L (ref 136–145)
WBC # BLD AUTO: 7.5 K/UL (ref 3.9–12.7)

## 2024-07-02 PROCEDURE — 80053 COMPREHEN METABOLIC PANEL: CPT | Performed by: STUDENT IN AN ORGANIZED HEALTH CARE EDUCATION/TRAINING PROGRAM

## 2024-07-02 PROCEDURE — 97535 SELF CARE MNGMENT TRAINING: CPT

## 2024-07-02 PROCEDURE — 25000003 PHARM REV CODE 250: Performed by: STUDENT IN AN ORGANIZED HEALTH CARE EDUCATION/TRAINING PROGRAM

## 2024-07-02 PROCEDURE — 83735 ASSAY OF MAGNESIUM: CPT

## 2024-07-02 PROCEDURE — 97116 GAIT TRAINING THERAPY: CPT

## 2024-07-02 PROCEDURE — 25000003 PHARM REV CODE 250

## 2024-07-02 PROCEDURE — 94761 N-INVAS EAR/PLS OXIMETRY MLT: CPT

## 2024-07-02 PROCEDURE — 97530 THERAPEUTIC ACTIVITIES: CPT

## 2024-07-02 PROCEDURE — 84100 ASSAY OF PHOSPHORUS: CPT

## 2024-07-02 PROCEDURE — 36415 COLL VENOUS BLD VENIPUNCTURE: CPT | Performed by: STUDENT IN AN ORGANIZED HEALTH CARE EDUCATION/TRAINING PROGRAM

## 2024-07-02 PROCEDURE — 85027 COMPLETE CBC AUTOMATED: CPT | Performed by: STUDENT IN AN ORGANIZED HEALTH CARE EDUCATION/TRAINING PROGRAM

## 2024-07-02 RX ORDER — FAMOTIDINE 20 MG/1
20 TABLET, FILM COATED ORAL 2 TIMES DAILY
Qty: 60 TABLET | Refills: 11 | Status: SHIPPED | OUTPATIENT
Start: 2024-07-02 | End: 2025-07-02

## 2024-07-02 RX ORDER — POLYETHYLENE GLYCOL 3350 17 G/17G
17 POWDER, FOR SOLUTION ORAL DAILY
Qty: 238 G | Refills: 0 | Status: SHIPPED | OUTPATIENT
Start: 2024-07-03

## 2024-07-02 RX ORDER — METOPROLOL TARTRATE 25 MG/1
25 TABLET, FILM COATED ORAL 3 TIMES DAILY
Qty: 270 TABLET | Refills: 3 | Status: SHIPPED | OUTPATIENT
Start: 2024-07-02 | End: 2025-07-02

## 2024-07-02 RX ORDER — FUROSEMIDE 20 MG/1
TABLET ORAL
Qty: 60 TABLET | Refills: 11 | Status: SHIPPED | OUTPATIENT
Start: 2024-07-02

## 2024-07-02 RX ORDER — OXYCODONE HYDROCHLORIDE 5 MG/1
5 TABLET ORAL EVERY 4 HOURS PRN
Qty: 42 TABLET | Refills: 0 | Status: SHIPPED | OUTPATIENT
Start: 2024-07-02

## 2024-07-02 RX ORDER — LISINOPRIL 20 MG/1
20 TABLET ORAL DAILY
Qty: 90 TABLET | Refills: 3 | Status: SHIPPED | OUTPATIENT
Start: 2024-07-03 | End: 2025-07-03

## 2024-07-02 RX ORDER — POTASSIUM CHLORIDE 20 MEQ/1
TABLET, EXTENDED RELEASE ORAL
Qty: 60 TABLET | Refills: 11 | Status: SHIPPED | OUTPATIENT
Start: 2024-07-02

## 2024-07-02 RX ORDER — ACETAMINOPHEN 500 MG
1000 TABLET ORAL EVERY 8 HOURS PRN
Qty: 30 TABLET | Refills: 0 | Status: SHIPPED | OUTPATIENT
Start: 2024-07-02

## 2024-07-02 RX ORDER — ASPIRIN 325 MG
325 TABLET, DELAYED RELEASE (ENTERIC COATED) ORAL DAILY
Qty: 30 TABLET | Refills: 11 | Status: SHIPPED | OUTPATIENT
Start: 2024-07-02 | End: 2025-07-02

## 2024-07-02 RX ADMIN — ATORVASTATIN CALCIUM 40 MG: 40 TABLET, FILM COATED ORAL at 09:07

## 2024-07-02 RX ADMIN — ASPIRIN 325 MG ORAL TABLET 325 MG: 325 PILL ORAL at 09:07

## 2024-07-02 RX ADMIN — METOPROLOL TARTRATE 25 MG: 25 TABLET, FILM COATED ORAL at 09:07

## 2024-07-02 RX ADMIN — POTASSIUM CHLORIDE 20 MEQ: 1500 TABLET, EXTENDED RELEASE ORAL at 09:07

## 2024-07-02 RX ADMIN — DOCUSATE SODIUM 100 MG: 100 CAPSULE, LIQUID FILLED ORAL at 09:07

## 2024-07-02 RX ADMIN — FUROSEMIDE 20 MG: 20 TABLET ORAL at 09:07

## 2024-07-02 RX ADMIN — FAMOTIDINE 20 MG: 20 TABLET ORAL at 09:07

## 2024-07-02 RX ADMIN — LISINOPRIL 20 MG: 20 TABLET ORAL at 09:07

## 2024-07-02 NOTE — PLAN OF CARE
Patient is ready for discharge. Patient stable alert and oriented. IVs removed. No complaints of pain. Discussed discharge plan. Reviewed medications and side effects, appointments, and answered questions with patient and family. Prescriptions brought up from bedside pharmacy and given to pt.

## 2024-07-02 NOTE — PT/OT/SLP PROGRESS
"Occupational Therapy   Treatment    Name: Tanika Olivera  MRN: [id number]  Admitting Diagnosis:  S/P aortic dissection repair  5 Days Post-Op    Recommendations:     Discharge Recommendations: Low Intensity Therapy  Discharge Equipment Recommendations:  none  Barriers to discharge:  None    Assessment:     Tanika Olivera is a 73 y.o. female with a medical diagnosis of S/P aortic dissection repair.  She presents with performance deficits affecting function: weakness, impaired endurance, impaired self care skills, impaired functional mobility, impaired cardiopulmonary response to activity. Patient found with PT Steffi for handoff, OT session focused on ADLs including standing self care & toileting. Patient oxygen saturation monitored throughout session, within normal limits at 94-95%. The patient continues to be motivated to participate in therapy and prognosis is good to continue rehabilitation services to increase independence in functional ADLs and mobility.    Rehab Prognosis:  Good; patient would benefit from acute skilled OT services to address these deficits and reach maximum level of function.       Plan:     Patient to be seen 5 x/week to address the above listed problems via self-care/home management, therapeutic exercises, therapeutic activities  Plan of Care Expires: 07/28/24  Plan of Care Reviewed with: patient    Subjective     Chief Complaint: None voiced  Patient/Family Comments/goals: "I understand I may feel shortness of breath as I recover, I can work with that"   Pain/Comfort:  Pain Rating 1: 0/10    Objective:     Communicated with: RN prior to session.  Patient found sitting edge of bed with telemetry upon OT entry to room. PT Steffi present at start of session for patient hand off.     General Precautions: Standard, fall, sternal    Orthopedic Precautions:N/A  Braces: N/A  Respiratory Status: Room air     Occupational Performance:       Functional Mobility/Transfers:  Patient completed Sit <> " Stand Transfer with contact guard assistance  with  no assistive device   Patient completed Toilet Transfer Stand Pivot technique with contact guard assistance with  no AD  Functional Mobility: Patient participated in functional mobility from bed to bathroom and back to chair to simulate household distances with CGA and no AD.     Activities of Daily Living:  Grooming: stand by assistance to complete oral care, and comb hair   Grooming static stand for 6 min with stand by assist, no LOB observed   Toileting: stand by assistance and set up assistance for toilet paper   SpO2 monitored while seated at EOB to start 94-95% and after ADLs 94-95%      [facility] 6 Click ADL: 20    Treatment & Education:  -Patient educated on the importance of communicating any physiological changes while standing, transfers, or participating in functional mobility.   -Education on the importance of position changing and functional mobility activity to promote recovery and endurance.  -Education to transfer and participate in mobility with hospital staff present    -Education on energy conservation and task modification to increase participation in functional activities   -Provided education on the role of OT.     Patient left up in chair with call button in reach. PA present and notified of oxygen saturation status during treatment session.     GOALS:   Multidisciplinary Problems       Occupational Therapy Goals          Problem: Occupational Therapy    Goal Priority Disciplines Outcome Interventions   Occupational Therapy Goal     OT, PT/OT Progressing    Description: Goals to be met by: 6/5/2024     Patient will increase functional independence with ADLs by performing:    LE Dressing with Modified [place].  Grooming while standing at sink with Supervision.  Toileting from toilet with Modified [place] for hygiene and clothing management.   Supine to sit with [place].  Step transfer with Supervision  Toilet transfer to toilet  with Supervision.                         Time Tracking:     OT Date of Treatment: 07/02/24  OT Start Time: 0815  OT Stop Time: 0831  OT Total Time (min): 16 min    Billable Minutes:Self Care/Home Management 16 min    OT/CASPRE: OT     Number of CASPER visits since last OT visit: 0    7/2/2024

## 2024-07-02 NOTE — HOSPITAL COURSE
On 6/27/2024, the patient was taken to the Operating Room for the above stated procedure. Please see the previously dictated operative report for complete details. Postoperatively, the patient was taken from the  Operating Room to the ICU where the vital signs were monitored and pain was kept under control. The patient was weaned from the drips and extubated in the ICU per protocol. Once hemodynamically stable, the patient was transferred to the Cardiac Step-Down floor for continued strengthening and ambulation. On postoperative day 7, the patient was ready for discharge to home. At the time of discharge, the patient was ambulating unassisted. Pain was well controlled with oral analgesics and the patient was tolerating the diet.    MOBILITY AND ACTIVITY: As tolerated. Patient may shower. No heavy lifting of greater than 5 pounds and no driving.     DIET: An 1800-calorie ADA with a 1500 mL fluid restriction.     WOUND CARE INSTRUCTIONS: Check for redness, swelling and drainage around the  incision or wound. Patient is to call for any obvious bleeding, drainage, pus from the wound, unusual problems or difficulties or temperature of greater than 101   degrees.     FOLLOWUP: Follow up with Dr. Sharma in approximately 3 weeks. Prior to this  appointment, the patient will have a chest x-ray and EKG.         DISCHARGE CONDITION: At the time of discharge, the patient was in sinus rhythm and afebrile with stable vital signs.

## 2024-07-02 NOTE — PROGRESS NOTES
Met with pt and her spouse at bedside and reviewed home wound care instructions upon discharge for pt's midsternal and chest tube site incisions, s/p Ascending Aorta Repair.  Reviewed daily inspection and cleaning of surgical incisions once discharged and instructed pt to call the clinic with any questions or concerns.  Pt instructed to remove dressings in place once discharged, with the exception of steristrips if present, to wash directly over her incisions with soap and water daily and pat them dry, and to leave incisions open to air.  Pt instructed to refrain from applying anything to her incisions, other than soap and water.  Pt provided with a hand-out (see below) which contains detailed instructions on daily wound care inspection and care and when to call the office, once discharged.  Pt reminded of her 5 pound lifting, pushing, and pulling restrictions for the first 6 weeks following her surgery and to refrain from driving until released by Dr. Sharma.  Pt instructed to wear her surgical bra / any bra without an underwire, to prevent dehiscence to her mid-sternal incision.  Pt instructed to continue utilizing her Incentive Spirometer to expand her lungs and prevent pneumonia. Pt instructed to weigh herself every morning, after waking up and going to the restroom, and to notify the clinic if she experiences a 3 pound weight gain from one morning to the next.  Pt encouraged to walk as much as possible throughout each day.  Pt reminded of her daily sodium and fluid restrictions.  Pt informed of her post-op appts on 7/22 and was provided with a copy of her appts for that day.  Pt verbalized understanding to all instructions and denied having questions at this time.        Showering   If your incisions are healing and there is no drainage - it is ok to take a shower.   Shower with your back to the shower spray.  It is ok for your incision to get wet, but the shower spray should not directly hit your chest.  Do  not soak in a tub.   The water temperature should be warm -- not too hot or cold. Extreme water temperatures can cause you to feel faint.  It is expected that you wash your incisions when you shower, however only use soap and water to cleanse the sites.  Use normal bar soap, not perfumed soap or body wash. During your recovery, do not try a new brand of soap.   Place soapy water on your hand or a clean washcloth and gently wash your incisions using an up-and-down motion.   Do not apply ointments, oils, or salves to your incisions.   Pat the skin gently to dry.      Wash your hands before and after caring for or touching your incisions.  Look in the mirror daily. Inspect your incisions for redness, drainage and warmth. Your incisions should be healing; there should NOT be an increase in opening.  Gently wash your incisions everyday with soap and warm water using a clean washcloth, or your hand and light touch.  Gently pat dry with a clean towel.  You do not need to cover your incisions unless they are draining.  If you are experiencing drainage, it is important to call your doctor.  Monday - Friday, from 8:00am - 5pm, call [phone].  Outside of these hours, please call [phone], which is a 24-hour nurse care advice line.     When to call your doctor:   Increased drainage or oozing from the incision(s)  Increased opening of the incision line(s) - there should not be gaps or pulling apart areas of the incision(s)  Redness along the incision(s) - if the incisions were red or pink when you left the hospital, they should be improving and not becoming more red  Warmth along the incision line(s)  Increased body temperature / Fever - greater than 101 degrees Fahrenheit  If you have diabetes and your blood sugar levels begin to vary

## 2024-07-02 NOTE — DISCHARGE SUMMARY
[facility] - Cardiology Stepdown  Cardiothoracic Surgery  Discharge Summary      Patient Name: Tanika Olivera  MRN: [id number]  Admission Date: 6/25/2024  Hospital Length of Stay: 7 days  Discharge Date and Time:  07/02/2024 10:23 AM  Attending Physician: Jean Paul Sharma MD   Discharging Provider: Mari Benitez PA-C  Primary Care Provider: Esdras Green MD    HPI:   This is a 73-year-old female who was transferred from an outside institution for chronic type a aortic dissection with an acute component.  Patient was suffering from dizziness and nausea.  Blood pressure control was initiated and after extensive discussion with the family patient wanted proceed with surgical repair.  Patient understood the risks, benefits and treatment options including the effects of hypothermic circulatory arrest not limited to stroke, neurological dysfunction, renal failure, progression of dizziness and nausea including death.  An informed consent was obtained.  Patient was accompanied by the spouse who also understood all the risks and benefits and agreed with the treatment plan.       Procedure(s) (LRB):  ASCENDING AORTA REPAIR (N/A)      Indwelling Lines/Drains at time of discharge:   Lines/Drains/Airways       None                 Hospital Course: On 6/27/2024, the patient was taken to the Operating Room for the above stated procedure. Please see the previously dictated operative report for complete details. Postoperatively, the patient was taken from the  Operating Room to the ICU where the vital signs were monitored and pain was kept under control. The patient was weaned from the drips and extubated in the ICU per protocol. Once hemodynamically stable, the patient was transferred to the Cardiac Step-Down floor for continued strengthening and ambulation. On postoperative day 5, the patient was ready for discharge to home. At the time of discharge, the patient was ambulating unassisted. Pain was well controlled with oral  analgesics and the patient was tolerating the diet.    MOBILITY AND ACTIVITY: As tolerated. Patient may shower. No heavy lifting of greater than 5 pounds and no driving.     DIET: An 1800-calorie ADA with a 1500 mL fluid restriction.     WOUND CARE INSTRUCTIONS: Check for redness, swelling and drainage around the  incision or wound. Patient is to call for any obvious bleeding, drainage, pus from the wound, unusual problems or difficulties or temperature of greater than 101   degrees.     FOLLOWUP: Follow up with Dr. Sharma in approximately 3 weeks. Prior to this  appointment, the patient will have a chest x-ray and EKG.         DISCHARGE CONDITION: At the time of discharge, the patient was in sinus rhythm and afebrile with stable vital signs.     Goals of Care Treatment Preferences:  Code Status: Full Code      Consults (From admission, onward)          Status Ordering Provider     Consult to Endocrinology  Once        Provider:  (Not yet assigned)    Completed GISELA SCHULTZ     Inpatient consult to Registered Dietitian/Nutritionist  Once        Provider:  (Not yet assigned)    Completed MARION TUNG            Significant Diagnostic Studies:       No new Assessment & Plan notes have been filed under this hospital service since the last note was generated.  Service: Cardiothoracic Surgery    Final Active Diagnoses:    Diagnosis Date Noted POA    PRINCIPAL PROBLEM:  S/P aortic dissection repair [Z98.890] 07/01/2024 Not Applicable    Acute blood loss anemia [D62] 07/01/2024 No    Stress hyperglycemia [R73.9] 06/27/2024 Unknown    Type 1 dissection of thoracic aorta [I71.010] 06/25/2024 Yes    Hypertension [I10] 11/07/2019 Yes     Chronic    Hyperlipidemia [E78.5] 11/07/2019 Yes     Chronic    Anxiety disorder [F41.9] 11/07/2019 Yes     Chronic      Problems Resolved During this Admission:      Discharged Condition: stable    Disposition: Home or Self Care    Follow Up:    Patient Instructions:   No discharge procedures on  file.  Medications:  Reconciled Home Medications:      Medication List        START taking these medications      acetaminophen 500 MG tablet  Commonly known as: TYLENOL  Take 2 tablets (1,000 mg total) by mouth every 8 (eight) hours as needed for Pain.     aspirin 325 MG EC tablet  Commonly known as: ECOTRIN  Take 1 tablet (325 mg total) by mouth once daily.     famotidine 20 MG tablet  Commonly known as: PEPCID  Take 1 tablet (20 mg total) by mouth 2 (two) times daily.     furosemide 20 MG tablet  Commonly known as: LASIX  Take one tablet by mouth twice daily for 7 days then decrease to once daily     metoprolol tartrate 25 MG tablet  Commonly known as: LOPRESSOR  Take 1 tablet (25 mg total) by mouth 3 (three) times daily.     oxyCODONE 5 MG immediate release tablet  Commonly known as: ROXICODONE  Take 1 tablet (5 mg total) by mouth every 4 (four) hours as needed for Pain.     polyethylene glycol 17 gram Pwpk  Commonly known as: GLYCOLAX  Take 17 g by mouth once daily.  Start taking on: July 3, 2024     potassium chloride SA 20 MEQ tablet  Commonly known as: K-DUR,KLOR-CON  Take one tablet by mouth twice daily for 7 days then decrease to once daily            CHANGE how you take these medications      lisinopriL 20 MG tablet  Commonly known as: PRINIVIL,ZESTRIL  Take 1 tablet (20 mg total) by mouth once daily.  Start taking on: July 3, 2024  What changed:   medication strength  how much to take            CONTINUE taking these medications      alendronate 70 MG tablet  Commonly known as: FOSAMAX  Take 1 tablet (70 mg total) by mouth every 7 days. take on empty stomach with full glass of water/do not eat or lie down for 1 hour after. For osteopenia     calcium carbonate 500 mg calcium (1,250 mg) tablet  Commonly known as: OS-KYLAH  Take 1 tablet by mouth once daily.     cholecalciferol (vitamin D3) 50 mcg (2,000 unit) Tab  Commonly known as: VITAMIN D3  Take 1 tablet by mouth once daily.     estradioL 0.01 % (0.1  mg/gram) vaginal cream  Commonly known as: ESTRACE  Place 2 g vaginally every 7 days.     fish oil-omega-3 fatty acids 300-1,000 mg capsule  Take 2 g by mouth once daily.     lovastatin 20 MG tablet  Commonly known as: MEVACOR  Take 1 tablet (20 mg total) by mouth every evening.     mirtazapine 15 MG tablet  Commonly known as: REMERON  Take 1 tablet (15 mg total) by mouth every evening.     MULTIPLE VITAMIN ORAL  Take 1 tablet by mouth once daily.            Time spent on the discharge of patient: 55 minutes    Mari Benitez PA-C  Cardiothoracic Surgery  [facility] - Cardiology Stepdown

## 2024-07-02 NOTE — PLAN OF CARE
07/02/24 1432   Final Note   Assessment Type Final Discharge Note   Anticipated Discharge Disposition Home   What phone number can be called within the next 1-3 days to see how you are doing after discharge? [phone]   Hospital Resources/Appts/Education Provided Provided patient/caregiver with written discharge plan information;Provided education on problems/symptoms using teachback   Post-Acute Status   Discharge Delays None known at this time     Patient discharged home self care. Per PA patient to go home, no needs per case management. She will follow up with CTS and they schedule their own follow up appointments.     Mary Jane Shaver RN    [phone]    Future Appointments   Date Time Provider Department Center   7/22/2024 10:45 AM [facility] [id number] LB LIMIT [facility] XRAY OP [facility]   7/22/2024 11:30 AM EKG, APPT [facility] EKG [facility]   7/22/2024 11:45 AM Alyssa Nicholas PA-C [facility] CARDVAS [facility]   8/26/2024  2:40 PM Jennyfer Hernandez, NP [facility] OFFAM O at [facility]

## 2024-07-02 NOTE — HPI
This is a 73-year-old female who was transferred from an outside institution for chronic type a aortic dissection with an acute component.  Patient was suffering from dizziness and nausea.  Blood pressure control was initiated and after extensive discussion with the family patient wanted proceed with surgical repair.  Patient understood the risks, benefits and treatment options including the effects of hypothermic circulatory arrest not limited to stroke, neurological dysfunction, renal failure, progression of dizziness and nausea including death.  An informed consent was obtained.  Patient was accompanied by the spouse who also understood all the risks and benefits and agreed with the treatment plan.

## 2024-07-02 NOTE — PLAN OF CARE
CHW met with patient/family at bedside. Patient experience rounding completed and reviewed the following.     Do you know your discharge plan? Yes or No,    If yes, what is the plan? (Home, Home Health, Rehab, SNF, LTAC, or Other) Yes Home    Have you discussed your needs and preferences with your SW/CM? Yes or No  Yes Home     If you are discharging home, do you have help at home? Yes or No Yes (spouse)    Do you think you will need help additional at home at discharge? Yes or No  No    Do you currently have difficulty keeping up with bills, affording medicine or buying food? Yes or No No    Assigned SW/CM notified of any patient/family needs or concerns. Appropriate resources provided to address patient's needs.          Marla Bain CHW  Case Management  [phone]

## 2024-07-02 NOTE — PT/OT/SLP PROGRESS
Physical Therapy Treatment    Patient Name:  Tanika Olivera   MRN:  [id number]    Recommendations:     Discharge Recommendations: Low Intensity Therapy  Discharge Equipment Recommendations: none  Barriers to discharge: None    Assessment:     Tanika Olivera is a 73 y.o. female admitted with a medical diagnosis of S/P aortic dissection repair.  She presents with the following impairments/functional limitations: weakness, impaired endurance, impaired self care skills, impaired functional mobility, gait instability, impaired balance, decreased safety awareness, impaired cardiopulmonary response to activity. Pt w/out dizziness today, able to ambulate further and w/ no LOB during first gait trial and only mild inc postural sway during second trial once fatigued.  Patient continues to demonstrate the need for low intensity therapy on a scheduled basis exhibited by decreased independence with self-care and functional mobility.     Orthostatic Vitals:   Position BP MAP   Lying in bed (HOB 30*) 155/83 107   Sitting /81 112   1min Standing 156/97 117   3min Standing 143/94 111        Rehab Prognosis: Good; patient would benefit from acute skilled PT services to address these deficits and reach maximum level of function.    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  Patient found HOB elevated with telemetry, oxygen upon PT entry to  room.     General Precautions: Standard, fall, sternal  Orthopedic Precautions: N/A  Braces: N/A  Respiratory Status: Nasal cannula, flow 1 L/min     Functional Mobility:  Bed Mobility:     Supine to Sit: contact guard assistance  Transfers:     Sit to Stand:  stand by assistance with no AD  Bed to Chair: stand by assistance with  no AD  using  Step Transfer  Gait: 2x 36' w/ no AD;pt initially requiring SBA progressing to [place] during second trial 2/2 fatigue leading to R lean; pt w/ dec arm swing,   Balance: static standing balance SBA; dynamic standing balance CGA      AM-PAC 6 CLICK MOBILITY  Turning over in bed (including adjusting bedclothes, sheets and blankets)?: 4  Sitting down on and standing up from a chair with arms (e.g., wheelchair, bedside commode, etc.): 3  Moving from lying on back to sitting on the side of the bed?: 3  Moving to and from a bed to a chair (including a wheelchair)?: 3  Need to walk in hospital room?: 3  Climbing 3-5 steps with a railing?: 3  Basic Mobility Total Score: 19       Treatment & Education:  Pt educated on PT POC/goals, d/c recs, and continued treatment. All questions answered and pt in agreement w/ POC.  Sternal precautions: pt educated to avoid pulling, pushing, and lifting >5lbs until cleared by her MD. Pt educated on modifications to make when performing functional mobility and ADLs to assist w/ adherence to these precautions and maintaining pt safety.  Pt recalled 2/3 and needed mod cueing throughout session to adhere  3min standing balance while reviewing and practicing use of IS  Gait training w/ verbal and visual cueing for step length, step width, step height, postural control, safety awareness, and width of RACHEL     Patient left sitting edge of bed with all lines intact, call button in reach, RN and CTS PA notified, and OT present..    GOALS:   Multidisciplinary Problems       Physical Therapy Goals          Problem: Physical Therapy    Goal Priority Disciplines  Outcome Goal Variances Interventions   Physical Therapy Goal     PT, PT/OT Progressing     Description: Goals to be met by: 24     Patient will increase functional independence with mobility by performin. Supine to sit with [place]  2. Sit to supine with [place]  3. Sit to stand transfer with Supervision  4. Bed to chair transfer with Supervision using No Assistive Device  5. Gait  x 250 feet with Supervision using No Assistive Device.                          Time Tracking:     PT Received On: 24  PT Start Time: 752     PT Stop Time: 822  PT Total Time (min): 30 min     Billable Minutes: Gait Training 20 and Therapeutic Activity 10    Treatment Type: Treatment  PT/PTA: PT     Number of PTA visits since last PT visit: 0     2024

## 2024-07-03 ENCOUNTER — TELEPHONE (OUTPATIENT)
Dept: FAMILY MEDICINE | Facility: CLINIC | Age: 74
End: 2024-07-03
Payer: MEDICARE

## 2024-07-03 ENCOUNTER — PATIENT OUTREACH (OUTPATIENT)
Dept: ADMINISTRATIVE | Facility: CLINIC | Age: 74
End: 2024-07-03
Payer: MEDICARE

## 2024-07-03 ENCOUNTER — TELEPHONE (OUTPATIENT)
Dept: CARDIOTHORACIC SURGERY | Facility: CLINIC | Age: 74
End: 2024-07-03
Payer: MEDICARE

## 2024-07-03 NOTE — TELEPHONE ENCOUNTER
Called pt following hospital discharge.  Spoke with pt's  and reiterated the need for pt to wash her incisions everyday with soap and water, refraining from the application of anything other than soap and water to her incisions, and to pat them dry with a clean towel, leaving them open to air.  Reminded pt's  that pt should walk as much as she can throughout each day.  Reminded pt's  that pt cannot drive for the first 4 weeks after surgery, and for pt to refrain from lifting, pushing, and pulling anything greater than 5 pounds for the first 6 weeks following her surgery.  Instructed pt's  to have pt perform daily weights.  Pt's  instructed to notify the clinic if pt has a weight gain greater than 3 pounds in one day.  Pt's  instructed to have pt wear her surgical bra / any bra without an underwire, to prevent dehiscence to her mid-sternal incision.  Pt's  instructed to have pt continue utilizing her Incentive Spirometer to expand her lungs and prevent pneumonia.  Pt's  reminded about post-op appts on 7/22.  Pt's  instructed to call the clinic with any questions or concerns.  Pt's  verbalized understanding to all instructions and denied having questions at this time.

## 2024-07-03 NOTE — PROGRESS NOTES
C3 nurse attempted to contact Tanika Olivera'  Gianluca for a TCC post hospital discharge follow up call. The patient is unable to conduct the call @ this time. The patient requested a callback.    The patient does not have a scheduled HOSFU appointment within 5-7 days post hospital discharge date 07/02/24. Message sent to Physician staff to assist with HOSFU appointment scheduling.         PT DAILY TREATMENT NOTE/LUMBAR EVAL     Patient Name: Marcella Aguilera  Date:2021  : 1953  [x]  Patient  Verified  Payor: [place] MED[place] / Plan: [place] MEDICARE PART A & B / Product Type: Medicare /    In time:10:40A Out time:11:20A  Total Treatment Time (min): 40  Visit #: 1 of 12    Medicare/BCBS Only   Total Timed Codes (min):  15 1:1 Treatment Time: 40     Treatment Area: Low back pain [M54.5]  SUBJECTIVE  Pain Level (0-10 scale): 3/10 (at worst: 6/10;   []constant []intermittent []improving []worsening []no change since onset    Any medication changes, allergies to medications, adverse drug reactions, diagnosis change, or new procedure performed?: [x] No    [] Yes (see summary sheet for update)  Subjective functional status/changes:     PLOF: patient enjoys shopping and riding bike   Current symptoms/Complaints:soreness/ache; stiffness in the morning; difficulty navigating stairs  Previous Treatment/Compliance: PT for back previously; Injections to low back  PMHx/Surgical Hx:arthritis, HTN, asthma  Living Situation: lives in 1 story home with 3 steps to enter  Pt Goals: \"to be able to bend and ride my bike\"     OBJECTIVE/EXAMINATION      Back pain in chronic. Patient fell about 3 months ago. She tripped over stones in her driveway and fell forward. She did not notice pain at that time. Pain more on right side. Patient reports knee pain with catching/popping; she has \"catching\" when bending her knee. Patient reports getting a new mattress recently. She has not performed her HEP recently.        25 min [x]Eval                  []Re-Eval       15 min Therapeutic Activity:  [x]  See flow sheet :   Rationale: increase ROM and increase strength  to improve the patients ability to perform ADLs with increased ease               With   [] TE   [x] TA   [] neuro   [] other: Patient Education: [x] Review HEP    [] Progressed/Changed HEP based on:   [] positioning   [] body mechanics   [] transfers   [] heat/ice application    [] other: diagnosis, prognosis, POC, purpose and importance of therapy, anatomy and physiology as it relates to current condition; implementing walking program, monitoring blood glucose levels; DOMS; use of heat at home     Other Objective/Functional Measures:     Physical Therapy Evaluation - Lumbar Spine ([facility])    SUBJECTIVE  Chief Complaint:    Mechanism of injury:    Symptoms:  Aggravated by:   [x] Bending [] Sitting [] Standing [] Walking   [] Moving [] Cough [] Sneeze [] Valsalva   [x] AM  [] PM  Lying:  [] sup   [] pro   [] sidelying   [x] Other: vacuuming     Eased by:    [] Bending [] Sitting [] Standing [x] Walking   [x] Moving [] AM  [] PM  Lying: [] sup  [x] pro  [] sidelying   [] Other:     General Health:  Red Flags Indicated? [x] Yes    [] No  [] Yes [x] No Recent weight change (If yes, due to dieting? [] Yes  [] No)   [] Yes [x] No Weakness in legs during walking  [] Yes [x] No Unremitting pain at night  [] Yes [x] No Abdominal pain or problems  [] Yes [] No Rectal bleeding  [] Yes [x] No Feet more cold or painful in cold weather  [] Yes [] No Menstrual irregularities  [] Yes [] No Blood or pain with urination  [] Yes [x] No Dysfunction of bowel or bladder   [] Yes [x] No Recent illness within past 3 weeks (i.e, cold, flu)  [] Yes [] No Numbness/tingling in buttock/genitalia region    Past History/Treatments:     Diagnostic Tests: [] Lab work [x] X-rays    [] CT [] MRI     [] Other: 10/1/2020  Results:Multilevel degenerative findings, trace levorotoscoliosis and multilevel  spondylolisthesis very similar to reference CT abdomen of May 13, 2016.     Functional Status  Prior level of function:  Present functional limitations:  What position do you sleep in?: prone    OBJECTIVE  Posture: rounded shoulders  Lateral Shift: [] R    [] L     [] +  [] -  Kyphosis: [] Increased [] Decreased   []  WNL  Lordosis:  [] Increased [] Decreased   [] WNL  Pelvic symmetry: [] WNL    [] Other:    Gait:  [] Normal     [] Abnormal:    Active Movements: [] N/A   [] Too acute   [] Other:  ROM % AROM % PROM Comments:pain, area   Forward flexion 40-60 75%     Extension 20-30 75%     SB right 20-30 100%     SB left 20-30 75%     Rotation right 5-10 100     Rotation left 5-10 100         Min pain with active movements    Palpation TTP B lumbar paraspinals, right QL    Strength   L(0-5) R (0-5) N/T   Hip Flexion (L1,2) 4 4 []   Knee Extension (L3,4) 4+ 4+ []   Ankle Dorsiflexion (L4) 4+ 5 []   Great Toe Extension (L5)   []   Ankle Plantarflexion (S1)   []   Knee Flexion (S1,2) 4- 5 []   Upper Abdominals   []   Lower Abdominals   []   Paraspinals   []   Back Rotators   []   Gluteus West   []   Other   []       Left hip er/ir 3+  Right hip ER 3+ IR 4    Special Tests    Sacroilliac:  Gaenslen's: [] R    [] L    [] +    [] -     Compression: [] +    [] -     Gapping:  [] +    [x] -     Thigh Thrust: [x] R    [x] L    [] +    [x] -     Leg Length: [] +    [] -   Position:    Crests:    ASIS:    PSIS:    Sacral Sulcus:    Mobility: Standing flex:     Sitting flex:     Supine to sit:     Prone knee bend: Other tests/comments:     Pelvic alignment WNL    Pain Level (0-10 scale) post treatment: 5/10    ASSESSMENT/Changes in Function: See POC    Patient will continue to benefit from skilled PT services to modify and progress therapeutic interventions, address functional mobility deficits, address ROM deficits, address strength deficits, analyze and address soft tissue restrictions, analyze and cue movement patterns, analyze and modify body mechanics/ergonomics, assess and modify postural abnormalities, address imbalance/dizziness and instruct in home and community integration to attain remaining goals.      [x]  See Plan of Care  []  See progress note/recertification  []  See Discharge Summary         Progress towards goals / Updated goals:  See POC    PLAN  []  Upgrade activities as tolerated [x]  Continue plan of care  []  Update interventions per flow sheet       []  Discharge due to:_  []  Other:_      Tomi Walters, PT 9/22/2021  10:21 AM

## 2024-07-03 NOTE — TELEPHONE ENCOUNTER
----- Message from Maria Antonia Ricks LPN sent at 7/3/2024  2:11 PM CDT -----  Call patient - needs post-hospital phone call within 2 business days and hospital follow up visit scheduled within 7-14 days.    Expected discharge-    Scheduled. -

## 2024-07-03 NOTE — PROGRESS NOTES
2nd attempt to make TCC Call. Left voicemail. Please call [phone] leave your first and last name and date of birth and ask for Barbara. I will return your call as soon as possible.

## 2024-07-05 ENCOUNTER — TELEPHONE (OUTPATIENT)
Dept: FAMILY MEDICINE | Facility: CLINIC | Age: 74
End: 2024-07-05
Payer: MEDICARE

## 2024-07-05 ENCOUNTER — PATIENT MESSAGE (OUTPATIENT)
Dept: ADMINISTRATIVE | Facility: CLINIC | Age: 74
End: 2024-07-05
Payer: MEDICARE

## 2024-07-05 NOTE — PROGRESS NOTES
3rd attempt for TCC Call; Left voicemail. Please call [phone] leave first and last name and  and ask for Barbara. I will return your call as soon as possible.     3rd attempt to contact patient. Manually enrolled in Post Discharge Tracker.

## 2024-07-05 NOTE — TELEPHONE ENCOUNTER
----- Message from Esdras Green MD sent at 7/4/2024  2:59 PM CDT -----  Call patient.  Mammogram was normal.  Repeat mammogram in 1 year.

## 2024-07-10 ENCOUNTER — TELEPHONE (OUTPATIENT)
Dept: CARDIOTHORACIC SURGERY | Facility: CLINIC | Age: 74
End: 2024-07-10
Payer: MEDICARE

## 2024-07-10 NOTE — TELEPHONE ENCOUNTER
"Received a call from pt's  who informed me that pt's b/p and weight have decreased from the last few days.  Pt's  stated that pt's weight the day after she she was discharged from the hospital was 109.6 pounds and is 102.2 pounds today.  Pt's  informed me of pt's b/p readings over the last several days:    7/6: 116/69  7/7: 120/78  7/8: 118/74  7/9: 112/70  7/10: 88/60    Inquired if pt is dizzy or lightheaded and pt's  informed me that pt is dizzy when she gets up.  Instructed for pt to go from a lying to sitting and sitting to standing position slowly which pt's  verbalized understanding to.  Reminded pt's  that beginning today, pt's Lasix and Potassium prescriptions are to be taken daily, not BID as pt has been doing for the last week, which pt's  verbalized understanding to.  Inquired how much fluid pt is drinking daily and pt's  stated "I don't think enough".  Inquired if pt is drinking 1 liter or more per day and pt's  stated that he didn't think so.  Encouraged for pt to increase her daily fluid intake up to 1.5 liters per day, but not more than 2 liters per day, which pt's  verbalized understanding to.  Informed Gretchen Ohara NP, of above who verbalized understanding and instructed for pt to check pt's b/p again in one hour and to contact Dr. Sharma's office if pt's b/p is lower, or if pt has any other issues.  Pt's  instructed to check pt's b/p again in one hour and to contact Dr. Sharma's office if pt's b/p is lower, or if pt has any other issues which he verbalized understanding to.  Instructed pt's  to contact Dr. Sharma's office with any questions or concerns, which he verbalized understanding to.  "

## 2024-07-11 ENCOUNTER — TELEPHONE (OUTPATIENT)
Dept: CARDIOTHORACIC SURGERY | Facility: CLINIC | Age: 74
End: 2024-07-11
Payer: MEDICARE

## 2024-07-11 NOTE — TELEPHONE ENCOUNTER
"Received a call from pt's , who informed me that pt's b/p earlier this morning was 85/69, with  and b/p was 92/48 with HR 88 at 11:00 AM.  Pt's  stated that pt "has no energy, and is forcing herself to eat, because she doesn't feel like eating".  Inquired if pt is dizzy or lightheaded, which pt's  asked pt and she denied.  Reminded pt's  that pt should go from a lying to sitting and sitting to standing position slowly, which pt's  verbalized understanding to.  Pt's  stated that pt's weight today was 103.2 pounds, from 102.2 pounds yesterday.  Inquired if pt has swelling to her lower extremities, which pt's  denied.  Pt's  confirmed that pt is continuing to take Lisinopril, 20 mg, PO, daily, and Metoprolol, 25 mg, PO, TID.  Informed NITESH Rodriguez, of above, who verbalized understanding and stated that pt should decrease Metoprolol to 25 mg, PO, BID, from 25 mg, PO, TID, and for pt to hold Lisinopril, unless her SBP is greater than 120.  Informed pt's  that per NITESH Nicholas, pt should decrease Metoprolol to 25 mg, PO, BID, from 25 mg, PO, TID, beginning today, and for pt to hold Lisinopril, unless her SBP is greater than 120, which pt's  verbalized understanding to.  Pt's  instructed to contact Dr. Sharma's office once pt takes her b/p if there are any questions regarding whether she should take medication or not, and / or if they have any questions or concerns, which he verbalized understanding to.    "

## 2024-07-12 ENCOUNTER — TELEPHONE (OUTPATIENT)
Dept: CARDIOTHORACIC SURGERY | Facility: CLINIC | Age: 74
End: 2024-07-12
Payer: MEDICARE

## 2024-07-12 NOTE — TELEPHONE ENCOUNTER
"Received a call from pt's  who informed me that pt's b/p this morning is 100/64 with HR 84, and stated that pt "looks a lot better, has a lot more energy, and is not as sluggish".  Pt's  inquired about pt's b/p medications and how they should proceed with dosing.  Informed NITESH Rodriguez, of above, who verbalized understanding and stated that pt should continue to take Metoprolol 25 mg, PO, BID, and for pt to hold Lisinopril, unless her SBP is greater than 120. Informed pt's  that per NITESH Nicholas, pt should continue to take Metoprolol, 25 mg, PO, BID, and for pt to hold Lisinopril, unless her SBP is greater than 120, which pt's  verbalized understanding to. Pt's  instructed to contact Dr. Sharma's office with any questions or concerns, which he verbalized understanding to.   "

## 2024-07-18 ENCOUNTER — TELEPHONE (OUTPATIENT)
Dept: CARDIOTHORACIC SURGERY | Facility: CLINIC | Age: 74
End: 2024-07-18
Payer: MEDICARE

## 2024-07-18 NOTE — TELEPHONE ENCOUNTER
"Received a call from pt's , who informed me that pt is "feeling good and moving around better, walking around the house throughout the day".  Pt's  inquired if he should continue to hold pt's Lisinopril (unless pt's SBP is greater than 120, at which time pt would take Lisinopril) , and give Metoprolol, 25 mg, PO, BID.  Pt's  informed me that pt's most recent vital signs (b/p and HR, respectively) are:    7/14: 97/74, 108  7/15: 121/78, 105  7/16: 114/81, 102  7/17: 107/79, 100  7/18: 109/77, 98    Informed NITESH Rodriguez, of above who verbalized understanding, and stated that pt should continue to hold Lisinopril, unless pt's SBP is greater than 120, and continue taking Metoprolol tartrate, 25 mg, PO, BID.  Informed pt's  of this, who verbalized understanding.  Inquired if pt's  has any further questions, which he denied having at this time.  Instructed pt's  to call back with any questions or concerns, which he verbalized understanding to.  "

## 2024-07-19 ENCOUNTER — PATIENT MESSAGE (OUTPATIENT)
Dept: CARDIOTHORACIC SURGERY | Facility: CLINIC | Age: 74
End: 2024-07-19
Payer: MEDICARE

## 2024-07-22 ENCOUNTER — OFFICE VISIT (OUTPATIENT)
Dept: CARDIOTHORACIC SURGERY | Facility: CLINIC | Age: 74
End: 2024-07-22
Attending: THORACIC SURGERY (CARDIOTHORACIC VASCULAR SURGERY)
Payer: MEDICARE

## 2024-07-22 ENCOUNTER — HOSPITAL ENCOUNTER (OUTPATIENT)
Dept: CARDIOLOGY | Facility: CLINIC | Age: 74
Discharge: HOME | End: 2024-07-22
Attending: THORACIC SURGERY (CARDIOTHORACIC VASCULAR SURGERY)
Payer: MEDICARE

## 2024-07-22 ENCOUNTER — DOCUMENTATION ONLY (OUTPATIENT)
Dept: CARDIOTHORACIC SURGERY | Facility: CLINIC | Age: 74
End: 2024-07-22

## 2024-07-22 ENCOUNTER — HOSPITAL ENCOUNTER (OUTPATIENT)
Dept: RADIOLOGY | Facility: HOSPITAL | Age: 74
Discharge: HOME | End: 2024-07-22
Attending: THORACIC SURGERY (CARDIOTHORACIC VASCULAR SURGERY)
Payer: MEDICARE

## 2024-07-22 VITALS
TEMPERATURE: 98 F | RESPIRATION RATE: 16 BRPM | BODY MASS INDEX: 20.93 KG/M2 | DIASTOLIC BLOOD PRESSURE: 80 MMHG | HEIGHT: 59 IN | SYSTOLIC BLOOD PRESSURE: 145 MMHG | HEART RATE: 78 BPM | OXYGEN SATURATION: 98 % | WEIGHT: 103.81 LBS

## 2024-07-22 DIAGNOSIS — Z98.890 S/P ASCENDING AORTIC ANEURYSM REPAIR: ICD-10-CM

## 2024-07-22 DIAGNOSIS — Z98.890 S/P ASCENDING AORTIC ANEURYSM REPAIR: Primary | ICD-10-CM

## 2024-07-22 DIAGNOSIS — Z86.79 S/P ASCENDING AORTIC ANEURYSM REPAIR: ICD-10-CM

## 2024-07-22 DIAGNOSIS — Z98.890 POST-OPERATIVE STATE: ICD-10-CM

## 2024-07-22 DIAGNOSIS — Z98.890 S/P AORTIC DISSECTION REPAIR: Primary | ICD-10-CM

## 2024-07-22 DIAGNOSIS — Z86.79 S/P ASCENDING AORTIC ANEURYSM REPAIR: Primary | ICD-10-CM

## 2024-07-22 DIAGNOSIS — Z76.89 ENCOUNTER TO ESTABLISH CARE: ICD-10-CM

## 2024-07-22 DIAGNOSIS — Z95.828 HX OF ASCENDING AORTA REPLACEMENT: ICD-10-CM

## 2024-07-22 LAB
OHS QRS DURATION: 64 MS
OHS QTC CALCULATION: 502 MS

## 2024-07-22 PROCEDURE — 1101F PT FALLS ASSESS-DOCD LE1/YR: CPT | Mod: CPTII,S$GLB,, | Performed by: PHYSICIAN ASSISTANT

## 2024-07-22 PROCEDURE — 71046 X-RAY EXAM CHEST 2 VIEWS: CPT | Mod: 26,,, | Performed by: RADIOLOGY

## 2024-07-22 PROCEDURE — 4010F ACE/ARB THERAPY RXD/TAKEN: CPT | Mod: CPTII,S$GLB,, | Performed by: PHYSICIAN ASSISTANT

## 2024-07-22 PROCEDURE — 1126F AMNT PAIN NOTED NONE PRSNT: CPT | Mod: CPTII,S$GLB,, | Performed by: PHYSICIAN ASSISTANT

## 2024-07-22 PROCEDURE — 99999 PR PBB SHADOW E&M-EST. PATIENT-LVL IV: CPT | Mod: PBBFAC,,, | Performed by: PHYSICIAN ASSISTANT

## 2024-07-22 PROCEDURE — 99024 POSTOP FOLLOW-UP VISIT: CPT | Mod: S$GLB,,, | Performed by: PHYSICIAN ASSISTANT

## 2024-07-22 PROCEDURE — 3288F FALL RISK ASSESSMENT DOCD: CPT | Mod: CPTII,S$GLB,, | Performed by: PHYSICIAN ASSISTANT

## 2024-07-22 PROCEDURE — 71046 X-RAY EXAM CHEST 2 VIEWS: CPT | Mod: TC,FY

## 2024-07-22 PROCEDURE — 1159F MED LIST DOCD IN RCRD: CPT | Mod: CPTII,S$GLB,, | Performed by: PHYSICIAN ASSISTANT

## 2024-07-22 PROCEDURE — 3061F NEG MICROALBUMINURIA REV: CPT | Mod: CPTII,S$GLB,, | Performed by: PHYSICIAN ASSISTANT

## 2024-07-22 PROCEDURE — 93010 ELECTROCARDIOGRAM REPORT: CPT | Mod: S$GLB,,, | Performed by: INTERNAL MEDICINE

## 2024-07-22 PROCEDURE — 3066F NEPHROPATHY DOC TX: CPT | Mod: CPTII,S$GLB,, | Performed by: PHYSICIAN ASSISTANT

## 2024-07-22 PROCEDURE — 3079F DIAST BP 80-89 MM HG: CPT | Mod: CPTII,S$GLB,, | Performed by: PHYSICIAN ASSISTANT

## 2024-07-22 PROCEDURE — 3077F SYST BP >= 140 MM HG: CPT | Mod: CPTII,S$GLB,, | Performed by: PHYSICIAN ASSISTANT

## 2024-07-22 PROCEDURE — 93005 ELECTROCARDIOGRAM TRACING: CPT | Mod: S$GLB,,, | Performed by: THORACIC SURGERY (CARDIOTHORACIC VASCULAR SURGERY)

## 2024-07-22 RX ORDER — ASPIRIN 325 MG
325 TABLET, DELAYED RELEASE (ENTERIC COATED) ORAL DAILY
Qty: 30 TABLET | Refills: 11 | Status: SHIPPED | OUTPATIENT
Start: 2024-07-22 | End: 2025-07-22

## 2024-07-22 RX ORDER — METOPROLOL TARTRATE 25 MG/1
25 TABLET, FILM COATED ORAL 2 TIMES DAILY
Qty: 60 TABLET | Refills: 11 | Status: SHIPPED | OUTPATIENT
Start: 2024-07-22 | End: 2025-07-22

## 2024-07-22 RX ORDER — METOPROLOL TARTRATE 25 MG/1
25 TABLET, FILM COATED ORAL 2 TIMES DAILY
Qty: 180 TABLET | Refills: 3 | Status: SHIPPED | OUTPATIENT
Start: 2024-07-22 | End: 2024-07-22

## 2024-07-22 NOTE — PROGRESS NOTES
Phase II cardiac rehab orders submitted to [facility].  Pt will follow up with PCP in one month, and establish care with Cardiology within the next 4 weeks; referral entered into Epic and inbasket message sent requesting Cardiology appt to establish care.  Pt reminded to continue with her 5 pound lifting, pushing, and pulling restrictions for two more weeks, and that she can then advance to lifting, pushing, and pulling up to 20 pounds for 6 weeks, for a total of 12 weeks of restrictions.  Pt reminded to continue washing her incisions everyday with soap and water.  Pt was provided with a copy of her AVS, and instructed on medication changes.  Pt verbalized understanding to all instructions and denied having questions at this time.

## 2024-07-22 NOTE — PATIENT INSTRUCTIONS
From today through August 8, please do not lift, push, or pull anything greater than 5 pounds.  From August 9 until September 19, you can lift, push, and pull up to 20 pounds.  Beginning on September 20, you will no longer have any lifting, pushing, or pulling restrictions.    Please follow up with your Cardiologist and PCP within the next 4 weeks.    Please continue to wash your surgical incisions everyday with soap and water and pat them dry with a clean towel.  Please do not apply anything to your incisions other than soap and water until you are 3 months out from your surgery.

## 2024-07-22 NOTE — PROGRESS NOTES
Patient seen and examined. Patient is progressively increasing activity. No significant complaints.     Sternum: stable, incision CDI  Chest xray: Acceptable post op chest  EKG: NSR     Assessment:  S/P Emergent repair of type A aortic dissection under hypothermic circulatory arrest, Replacement of ascending and hemiarch using a 28 size Gelweave graft, Repair of right femoral artery 6/27/24     Plan:  Can begin driving as long as he has power steering  Can begin cardiac rehab in the next couple of weeks  We will refer to cardiology to assume care - referral sent to cardiology in [place]   DC lasix  DC potassium  Continue metop 25 BID     Patient has been holding lisinopril for soft blood pressure. At home reports that it has not been above 120. Slightly elevated today in clinic but patient is nervous. Instructed to take medication if pressure >= 120.     Sutures removed from sternal incision and chest tube sites. Right groin incision with steri strips and appears to be well healed.     No scheduled appointment, RTC prn

## 2024-07-25 ENCOUNTER — CLINICAL SUPPORT (OUTPATIENT)
Dept: CARDIAC REHAB | Facility: HOSPITAL | Age: 74
End: 2024-07-25
Payer: MEDICARE

## 2024-07-25 DIAGNOSIS — Z98.890 S/P ASCENDING AORTIC ANEURYSM REPAIR: ICD-10-CM

## 2024-07-25 DIAGNOSIS — Z86.79 S/P ASCENDING AORTIC ANEURYSM REPAIR: ICD-10-CM

## 2024-07-25 PROCEDURE — 93797 PHYS/QHP OP CAR RHAB WO ECG: CPT

## 2024-07-30 ENCOUNTER — CLINICAL SUPPORT (OUTPATIENT)
Dept: CARDIAC REHAB | Facility: HOSPITAL | Age: 74
End: 2024-07-30
Payer: MEDICARE

## 2024-07-30 DIAGNOSIS — Z86.79 S/P ASCENDING AORTIC ANEURYSM REPAIR: Primary | ICD-10-CM

## 2024-07-30 DIAGNOSIS — Z98.890 S/P ASCENDING AORTIC ANEURYSM REPAIR: Primary | ICD-10-CM

## 2024-07-30 PROCEDURE — 93798 PHYS/QHP OP CAR RHAB W/ECG: CPT

## 2024-08-01 ENCOUNTER — CLINICAL SUPPORT (OUTPATIENT)
Dept: CARDIAC REHAB | Facility: HOSPITAL | Age: 74
End: 2024-08-01
Payer: MEDICARE

## 2024-08-01 DIAGNOSIS — Z86.79 S/P ASCENDING AORTIC ANEURYSM REPAIR: Primary | ICD-10-CM

## 2024-08-01 DIAGNOSIS — Z98.890 S/P ASCENDING AORTIC ANEURYSM REPAIR: Primary | ICD-10-CM

## 2024-08-01 PROCEDURE — 93798 PHYS/QHP OP CAR RHAB W/ECG: CPT

## 2024-08-06 ENCOUNTER — CLINICAL SUPPORT (OUTPATIENT)
Dept: CARDIAC REHAB | Facility: HOSPITAL | Age: 74
End: 2024-08-06
Payer: MEDICARE

## 2024-08-06 DIAGNOSIS — Z98.890 S/P ASCENDING AORTIC ANEURYSM REPAIR: Primary | ICD-10-CM

## 2024-08-06 DIAGNOSIS — Z86.79 S/P ASCENDING AORTIC ANEURYSM REPAIR: Primary | ICD-10-CM

## 2024-08-06 PROCEDURE — 93798 PHYS/QHP OP CAR RHAB W/ECG: CPT

## 2024-08-06 PROCEDURE — 93797 PHYS/QHP OP CAR RHAB WO ECG: CPT

## 2024-08-08 ENCOUNTER — CLINICAL SUPPORT (OUTPATIENT)
Dept: CARDIAC REHAB | Facility: HOSPITAL | Age: 74
End: 2024-08-08
Payer: MEDICARE

## 2024-08-08 DIAGNOSIS — Z86.79 S/P ASCENDING AORTIC ANEURYSM REPAIR: Primary | ICD-10-CM

## 2024-08-08 DIAGNOSIS — Z98.890 S/P ASCENDING AORTIC ANEURYSM REPAIR: Primary | ICD-10-CM

## 2024-08-08 PROCEDURE — 93798 PHYS/QHP OP CAR RHAB W/ECG: CPT

## 2024-08-09 ENCOUNTER — OFFICE VISIT (OUTPATIENT)
Dept: CARDIOLOGY | Facility: CLINIC | Age: 74
End: 2024-08-09
Payer: MEDICARE

## 2024-08-09 VITALS
BODY MASS INDEX: 20.89 KG/M2 | HEART RATE: 85 BPM | DIASTOLIC BLOOD PRESSURE: 78 MMHG | WEIGHT: 103.63 LBS | HEIGHT: 59 IN | SYSTOLIC BLOOD PRESSURE: 157 MMHG

## 2024-08-09 DIAGNOSIS — Z76.89 ENCOUNTER TO ESTABLISH CARE: ICD-10-CM

## 2024-08-09 DIAGNOSIS — Z86.79 S/P ASCENDING AORTIC ANEURYSM REPAIR: ICD-10-CM

## 2024-08-09 DIAGNOSIS — I71.010 DISSECTION OF ASCENDING AORTA: Primary | ICD-10-CM

## 2024-08-09 DIAGNOSIS — Z98.890 S/P ASCENDING AORTIC ANEURYSM REPAIR: ICD-10-CM

## 2024-08-09 DIAGNOSIS — E78.2 MIXED HYPERLIPIDEMIA: ICD-10-CM

## 2024-08-09 DIAGNOSIS — I10 HYPERTENSION, UNSPECIFIED TYPE: ICD-10-CM

## 2024-08-09 PROCEDURE — 99999 PR PBB SHADOW E&M-EST. PATIENT-LVL IV: CPT | Mod: PBBFAC,,, | Performed by: INTERNAL MEDICINE

## 2024-08-09 RX ORDER — METOPROLOL SUCCINATE 50 MG/1
50 TABLET, EXTENDED RELEASE ORAL DAILY
Qty: 90 TABLET | Refills: 1 | Status: SHIPPED | OUTPATIENT
Start: 2024-08-09 | End: 2025-02-05

## 2024-08-09 RX ORDER — AMLODIPINE BESYLATE 5 MG/1
5 TABLET ORAL DAILY
Qty: 30 TABLET | Refills: 5 | Status: SHIPPED | OUTPATIENT
Start: 2024-08-09 | End: 2025-02-05

## 2024-08-13 ENCOUNTER — CLINICAL SUPPORT (OUTPATIENT)
Dept: CARDIAC REHAB | Facility: HOSPITAL | Age: 74
End: 2024-08-13
Payer: MEDICARE

## 2024-08-13 DIAGNOSIS — Z86.79 S/P ASCENDING AORTIC ANEURYSM REPAIR: Primary | ICD-10-CM

## 2024-08-13 DIAGNOSIS — Z98.890 S/P ASCENDING AORTIC ANEURYSM REPAIR: Primary | ICD-10-CM

## 2024-08-13 PROCEDURE — 93798 PHYS/QHP OP CAR RHAB W/ECG: CPT

## 2024-08-15 ENCOUNTER — CLINICAL SUPPORT (OUTPATIENT)
Dept: CARDIAC REHAB | Facility: HOSPITAL | Age: 74
End: 2024-08-15
Payer: MEDICARE

## 2024-08-15 DIAGNOSIS — Z86.79 S/P ASCENDING AORTIC ANEURYSM REPAIR: Primary | ICD-10-CM

## 2024-08-15 DIAGNOSIS — Z98.890 S/P ASCENDING AORTIC ANEURYSM REPAIR: Primary | ICD-10-CM

## 2024-08-15 PROCEDURE — 93798 PHYS/QHP OP CAR RHAB W/ECG: CPT

## 2024-08-19 NOTE — TELEPHONE ENCOUNTER
Last OV in May, next visit in November. Mammogram UTD. Set up if okay    initiation of breastfeeding/breast milk feeding

## 2024-08-20 ENCOUNTER — CLINICAL SUPPORT (OUTPATIENT)
Dept: CARDIAC REHAB | Facility: HOSPITAL | Age: 74
End: 2024-08-20
Payer: MEDICARE

## 2024-08-20 DIAGNOSIS — Z86.79 S/P ASCENDING AORTIC ANEURYSM REPAIR: Primary | ICD-10-CM

## 2024-08-20 DIAGNOSIS — Z98.890 S/P ASCENDING AORTIC ANEURYSM REPAIR: Primary | ICD-10-CM

## 2024-08-20 PROCEDURE — 93798 PHYS/QHP OP CAR RHAB W/ECG: CPT

## 2024-08-22 ENCOUNTER — CLINICAL SUPPORT (OUTPATIENT)
Dept: CARDIAC REHAB | Facility: HOSPITAL | Age: 74
End: 2024-08-22
Payer: MEDICARE

## 2024-08-22 DIAGNOSIS — Z86.79 S/P ASCENDING AORTIC ANEURYSM REPAIR: Primary | ICD-10-CM

## 2024-08-22 DIAGNOSIS — Z98.890 S/P ASCENDING AORTIC ANEURYSM REPAIR: Primary | ICD-10-CM

## 2024-08-22 PROCEDURE — 93798 PHYS/QHP OP CAR RHAB W/ECG: CPT

## 2024-08-26 ENCOUNTER — OFFICE VISIT (OUTPATIENT)
Dept: FAMILY MEDICINE | Facility: CLINIC | Age: 74
End: 2024-08-26
Payer: MEDICARE

## 2024-08-26 VITALS
HEIGHT: 59 IN | WEIGHT: 104 LBS | BODY MASS INDEX: 20.96 KG/M2 | DIASTOLIC BLOOD PRESSURE: 80 MMHG | OXYGEN SATURATION: 99 % | SYSTOLIC BLOOD PRESSURE: 136 MMHG | HEART RATE: 90 BPM

## 2024-08-26 DIAGNOSIS — F51.01 PRIMARY INSOMNIA: ICD-10-CM

## 2024-08-26 DIAGNOSIS — I71.010: ICD-10-CM

## 2024-08-26 DIAGNOSIS — E78.2 MIXED HYPERLIPIDEMIA: ICD-10-CM

## 2024-08-26 DIAGNOSIS — I10 ESSENTIAL HYPERTENSION: Primary | ICD-10-CM

## 2024-08-26 DIAGNOSIS — Z98.890 S/P AORTIC DISSECTION REPAIR: ICD-10-CM

## 2024-08-26 PROCEDURE — 3075F SYST BP GE 130 - 139MM HG: CPT | Mod: CPTII,S$GLB,, | Performed by: NURSE PRACTITIONER

## 2024-08-26 PROCEDURE — 3288F FALL RISK ASSESSMENT DOCD: CPT | Mod: CPTII,S$GLB,, | Performed by: NURSE PRACTITIONER

## 2024-08-26 PROCEDURE — 3008F BODY MASS INDEX DOCD: CPT | Mod: CPTII,S$GLB,, | Performed by: NURSE PRACTITIONER

## 2024-08-26 PROCEDURE — 4010F ACE/ARB THERAPY RXD/TAKEN: CPT | Mod: CPTII,S$GLB,, | Performed by: NURSE PRACTITIONER

## 2024-08-26 PROCEDURE — 3066F NEPHROPATHY DOC TX: CPT | Mod: CPTII,S$GLB,, | Performed by: NURSE PRACTITIONER

## 2024-08-26 PROCEDURE — 3061F NEG MICROALBUMINURIA REV: CPT | Mod: CPTII,S$GLB,, | Performed by: NURSE PRACTITIONER

## 2024-08-26 PROCEDURE — 1126F AMNT PAIN NOTED NONE PRSNT: CPT | Mod: CPTII,S$GLB,, | Performed by: NURSE PRACTITIONER

## 2024-08-26 PROCEDURE — 3079F DIAST BP 80-89 MM HG: CPT | Mod: CPTII,S$GLB,, | Performed by: NURSE PRACTITIONER

## 2024-08-26 PROCEDURE — 1160F RVW MEDS BY RX/DR IN RCRD: CPT | Mod: CPTII,S$GLB,, | Performed by: NURSE PRACTITIONER

## 2024-08-26 PROCEDURE — 99214 OFFICE O/P EST MOD 30 MIN: CPT | Mod: S$GLB,,, | Performed by: NURSE PRACTITIONER

## 2024-08-26 PROCEDURE — 1159F MED LIST DOCD IN RCRD: CPT | Mod: CPTII,S$GLB,, | Performed by: NURSE PRACTITIONER

## 2024-08-26 PROCEDURE — 1101F PT FALLS ASSESS-DOCD LE1/YR: CPT | Mod: CPTII,S$GLB,, | Performed by: NURSE PRACTITIONER

## 2024-08-26 RX ORDER — MIRTAZAPINE 15 MG/1
15 TABLET, FILM COATED ORAL NIGHTLY
Qty: 90 TABLET | Refills: 3 | Status: SHIPPED | OUTPATIENT
Start: 2024-08-26

## 2024-08-26 RX ORDER — LOVASTATIN 20 MG/1
20 TABLET ORAL NIGHTLY
Qty: 90 TABLET | Refills: 3 | Status: SHIPPED | OUTPATIENT
Start: 2024-08-26

## 2024-08-26 NOTE — PROGRESS NOTES
SUBJECTIVE:    Patient ID: Tanika Olivera is a 73 y.o. female.    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 Pt reports was in the hospital total of 7 days- she reports postop she did very well. Had f/u with Dr. Sharma, vascular surgeon and discharged and has now established with Dr. Mcdermott, davey and has follow-up next month with him    Pt reports she's monitoring BP at home and it's been well controlled. Reports she's feeling good, no c/o's    Has been going to cardiac rehab twice a week            Office Visit on 08/09/2024   Component Date Value Ref Range Status    QRS Duration 08/09/2024 66  ms Final    [facility] QTC Calculation 08/09/2024 443  ms Final   Hospital Outpatient Visit on 07/22/2024   Component Date Value Ref Range Status    QRS Duration 07/22/2024 64  ms Final    [facility] QTC Calculation 07/22/2024 502  ms Final   No results displayed because visit has over 200 results.      Patient Outreach on 06/27/2024   Component Date Value Ref Range Status    BCS Recommendation External 06/20/2024 Repeat mammogram in 1 year   Final   Admission on 06/25/2024, Discharged on 06/25/2024   Component Date Value Ref Range Status    Magnesium 06/25/2024 2.1  1.6 - 2.6 mg/dL Final    QRS Duration 06/25/2024 74  ms Final    [facility] QTC Calculation 06/25/2024 435  ms Final    WBC 06/25/2024 9.84  3.90 - 12.70 K/uL Final    RBC 06/25/2024 4.54  4.00 - 5.40 M/uL Final    Hemoglobin 06/25/2024 13.7  12.0 - 16.0 g/dL Final    Hematocrit 06/25/2024 39.9  37.0 - 48.5 % Final    MCV  06/25/2024 88  82 - 98 fL Final    MCH 06/25/2024 30.2  27.0 - 31.0 pg Final    MCHC 06/25/2024 34.3  32.0 - 36.0 g/dL Final    RDW 06/25/2024 12.4  11.5 - 14.5 % Final    Platelets 06/25/2024 436  150 - 450 K/uL Final    MPV 06/25/2024 8.7 (L)  9.2 - 12.9 fL Final    Immature Granulocytes 06/25/2024 0.3  0.0 - 0.5 % Final    Gran # (ANC) 06/25/2024 8.8 (H)  1.8 - 7.7 K/uL Final    Immature Grans (Abs) 06/25/2024 0.03  0.00 - 0.04 K/uL Final    Lymph # 06/25/2024 0.7 (L)  1.0 - 4.8 K/uL Final    Mono # 06/25/2024 0.3  0.3 - 1.0 K/uL Final    Eos # 06/25/2024 0.0  0.0 - 0.5 K/uL Final    Baso # 06/25/2024 0.01  0.00 - 0.20 K/uL Final    nRBC 06/25/2024 0  0 /100 WBC Final    Gran % 06/25/2024 88.9 (H)  38.0 - 73.0 % Final    Lymph % 06/25/2024 7.3 (L)  18.0 - 48.0 % Final    Mono % 06/25/2024 3.4 (L)  4.0 - 15.0 % Final    Eosinophil % 06/25/2024 0.0  0.0 - 8.0 % Final    Basophil % 06/25/2024 0.1  0.0 - 1.9 % Final    Differential Method 06/25/2024 Automated   Final    Sodium 06/25/2024 131 (L)  136 - 145 mmol/L Final    Potassium 06/25/2024 3.5  3.5 - 5.1 mmol/L Final    Chloride 06/25/2024 96  95 - 110 mmol/L Final    CO2 06/25/2024 24  23 - 29 mmol/L Final    Glucose 06/25/2024 125 (H)  70 - 110 mg/dL Final    BUN 06/25/2024 13  8 - 23 mg/dL Final    Creatinine 06/25/2024 0.5  0.5 - 1.4 mg/dL Final    Calcium 06/25/2024 9.4  8.7 - 10.5 mg/dL Final    Total Protein 06/25/2024 8.6 (H)  6.0 - 8.4 g/dL Final    Albumin 06/25/2024 4.2  3.5 - 5.2 g/dL Final    Total Bilirubin 06/25/2024 0.5  0.1 - 1.0 mg/dL Final    Alkaline Phosphatase 06/25/2024 73  55 - 135 U/L Final    AST 06/25/2024 20  10 - 40 U/L Final    ALT 06/25/2024 11  10 - 44 U/L Final    eGFR 06/25/2024 >60.0  >60 mL/min/1.73 m^2 Final    Anion Gap 06/25/2024 11  8 - 16 mmol/L Final    Troponin I High Sensitivity 06/25/2024 7.5  0.0 - 14.9 pg/mL Final    Troponin I High Sensitivity 06/25/2024 8.3  0.0 - 14.9 pg/mL Final    BNP 06/25/2024 108 (H)  0 - 99  pg/mL Final    Specimen UA 06/25/2024 Urine, Clean Catch   Final    Color, UA 06/25/2024 Orange (A)  Yellow, Straw, Kendy Final    Appearance, UA 06/25/2024 Cloudy (A)  Clear Final    pH, UA 06/25/2024 8.0  5.0 - 8.0 Final    Specific Gravity, UA 06/25/2024 1.015  1.005 - 1.030 Final    Protein, UA 06/25/2024 1+ (A)  Negative Final    Glucose, UA 06/25/2024 Negative  Negative Final    Ketones, UA 06/25/2024 1+ (A)  Negative Final    Bilirubin (UA) 06/25/2024 Negative  Negative Final    Occult Blood UA 06/25/2024 Negative  Negative Final    Nitrite, UA 06/25/2024 Negative  Negative Final    Urobilinogen, UA 06/25/2024 Negative  Negative EU/dL Final    Leukocytes, UA 06/25/2024 Negative  Negative Final    RBC, UA 06/25/2024 8 (H)  0 - 4 /hpf Final    WBC, UA 06/25/2024 0  0 - 5 /hpf Final    Bacteria 06/25/2024 Rare  None-Occ /hpf Final    Squam Epithel, UA 06/25/2024 7  /hpf Final    Hyaline Casts, UA 06/25/2024 0  0-1/lpf /lpf Final    Amorphous, UA 06/25/2024 Many (A)  None-Moderate Final    Microscopic Comment 06/25/2024 SEE COMMENT   Final    UNIT NUMBER 06/25/2024 [id number]   Final    Product Code 06/25/2024 [id number]   Final    DISPENSE STATUS 06/25/2024 RETURNED   Final    CODING SYSTEM 06/25/2024 [id number]   Final    Unit Blood Type Code 06/25/2024 6200   Final    Unit Blood Type 06/25/2024 A POS   Final    Unit Expiration 06/25/2024 202407062359   Final    CROSSMATCH INTERPRETATION 06/25/2024 Compatible   Final    UNIT NUMBER 06/25/2024 [id number]   Final    Product Code 06/25/2024 [id number]   Final    DISPENSE STATUS 06/25/2024 RETURNED   Final    CODING SYSTEM 06/25/2024 [id number]   Final    Unit Blood Type Code 06/25/2024 6200   Final    Unit Blood Type 06/25/2024 A POS   Final    Unit Expiration 06/25/2024 [id number]   Final    CROSSMATCH INTERPRETATION 06/25/2024 Compatible   Final    UNIT NUMBER 06/25/2024 [id number]   Final    Product Code 06/25/2024 [id number]   Final    DISPENSE STATUS  06/25/2024 RETURNED   Final    CODING SYSTEM 06/25/2024 [id number]   Final    Unit Blood Type Code 06/25/2024 6200   Final    Unit Blood Type 06/25/2024 A POS   Final    Unit Expiration 06/25/2024 [id number]   Final    CROSSMATCH INTERPRETATION 06/25/2024 Compatible   Final    UNIT NUMBER 06/25/2024 [id number]   Final    Product Code 06/25/2024 [id number]   Final    DISPENSE STATUS 06/25/2024 RETURNED   Final    CODING SYSTEM 06/25/2024 [id number]   Final    Unit Blood Type Code 06/25/2024 6200   Final    Unit Blood Type 06/25/2024 A POS   Final    Unit Expiration 06/25/2024 [id number]   Final    CROSSMATCH INTERPRETATION 06/25/2024 Compatible   Final    Group & Rh 06/25/2024 A POS   Final    Indirect Gloria 06/25/2024 NEG   Final    Specimen Outdate 06/25/2024 06/28/2024 23:59   Final    Urine Culture, Routine 06/25/2024 Multiple organisms isolated. None in predominance.  Repeat if   Final    Urine Culture, Routine 06/25/2024 clinically necessary.   Final       Past Medical History:   Diagnosis Date    History of hepatitis C, s/p successful Rx w/ SVR24 (cure) - 11/2018 2/23/2017    S/p epclusa w/ SVR    HTN (hypertension)     Hyperlipidemia     Osteoporosis      Past Surgical History:   Procedure Laterality Date    BREAST SURGERY  02/13/2007    Breast implants    CARDIAC VALVE REPLACEMENT  6/27/24    COLONOSCOPY N/A 07/05/2022    Procedure: COLONOSCOPY;  Surgeon: Fran Palomares MD;  Location: [facility];  Service: Endoscopy;  Laterality: N/A;    CORONARY ARTERY BYPASS GRAFT  6/27/24    EYE SURGERY  4/17/2019    Cataract surgery    REPAIR OF ASCENDING AORTA N/A 06/27/2024    Procedure: ASCENDING AORTA REPAIR;  Surgeon: Jean Paul Sharma MD;  Location: [address];  Service: Cardiovascular;  Laterality: N/A;  Type A Dissection repair, Hemiarch replacement 28 mm graft , Deep hypothermic circulatory arrest    TOTAL ABDOMINAL HYSTERECTOMY       Family History   Problem Relation Name Age of Onset    Hypertension  Mother Tanika Oniate     Hyperlipidemia Mother Tanika Oniate     Arthritis Mother Tanika Oniate     Stroke Father Sonu Oniate     Depression Father Sonu Oniate     Depression Sister Alice Oniate        Tests to Keep You Healthy    Mammogram: Met on 6/20/2024  Colon Cancer Screening: Met on 7/5/2022  Last Blood Pressure <= 139/89 (8/26/2024): NO      The 10-year CVD risk score (JONH'Alinaino, et al., 2008) is: 10.9%    Values used to calculate the score:      Age: 73 years      Sex: Female      Diabetic: No      Tobacco smoker: No      Systolic Blood Pressure: 136 mmHg      Is BP treated: Yes      HDL Cholesterol: 97 mg/dL      Total Cholesterol: 208 mg/dL     Marital Status:   Alcohol History:  reports no history of alcohol use.  Tobacco History:  reports that she has never smoked. She has never been exposed to tobacco smoke. She has never used smokeless tobacco.  Drug History:  reports no history of drug use.    Health Maintenance Topics with due status: Not Due       Topic Last Completion Date    Colorectal Cancer Screening 07/05/2022    DEXA Scan 06/19/2023    Influenza Vaccine 10/16/2023    Lipid Panel 02/14/2024    Mammogram 06/20/2024     Immunization History   Administered Date(s) Administered    COVID-19, MRNA, LN-S, PF (Pfizer) (Gray Cap) 06/11/2022    COVID-19, MRNA, LN-S, PF (Pfizer) (Purple Cap) 03/05/2021, 03/26/2021, 10/06/2021    Hepatitis A, Adult 03/02/2017, 09/05/2017    Influenza 12/28/2006, 11/30/2007, 10/24/2008, 11/20/2009, 11/05/2010, 10/24/2011, 11/15/2016, 10/31/2020    Influenza (FLUAD) - Quadrivalent - Adjuvanted - PF *Preferred* (65+) 10/02/2021    Influenza - High Dose - PF (65 years and older) 10/31/2018, 10/07/2019    Influenza - Quadrivalent - High Dose - PF (65 years and older) 09/10/2020, 10/16/2023    Influenza - Quadrivalent - PF *Preferred* (6 months and older) 10/23/2015, 11/15/2016, 10/26/2017    Influenza - Trivalent (ADULT) 11/30/2007, 10/24/2008, 11/20/2009, 10/24/2011     Influenza - Trivalent - PF (ADULT) 12/28/2006, 11/05/2010, 10/22/2012, 01/06/2014, 01/12/2015, 11/15/2016    Influenza A (H1N1) 2009 Monovalent - IM 11/20/2009    Pneumococcal Conjugate - 13 Valent 10/31/2018    Pneumococcal Polysaccharide - 23 Valent 01/06/2014, 11/07/2019    RSVpreF (Arexvy) 10/16/2023    Zoster Recombinant 10/07/2019, 12/17/2019       Review of patient's allergies indicates:  No Known Allergies    Current Outpatient Medications:     alendronate (FOSAMAX) 70 MG tablet, Take 1 tablet (70 mg total) by mouth every 7 days. take on empty stomach with full glass of water/do not eat or lie down for 1 hour after. For osteopenia, Disp: 12 tablet, Rfl: 3    amLODIPine (NORVASC) 5 MG tablet, Take 1 tablet (5 mg total) by mouth once daily., Disp: 30 tablet, Rfl: 5    aspirin (ECOTRIN) 325 MG EC tablet, Take 1 tablet (325 mg total) by mouth once daily., Disp: 30 tablet, Rfl: 11    calcium carbonate (OS-KYLAH) 500 mg calcium (1,250 mg) tablet, Take 1 tablet by mouth once daily., Disp: , Rfl:     cholecalciferol, vitamin D3, (VITAMIN D3) 2,000 unit Tab, Take 1 tablet by mouth once daily., Disp: , Rfl:     estradioL (ESTRACE) 0.01 % (0.1 mg/gram) vaginal cream, Place 2 g vaginally every 7 days., Disp: 42.5 g, Rfl: 5    fish oil-omega-3 fatty acids 300-1,000 mg capsule, Take 2 g by mouth once daily., Disp: , Rfl:     metoprolol succinate (TOPROL-XL) 50 MG 24 hr tablet, Take 1 tablet (50 mg total) by mouth once daily., Disp: 90 tablet, Rfl: 1    MULTIVITAMIN (MULTIPLE VITAMIN ORAL), Take 1 tablet by mouth once daily., Disp: , Rfl:     lovastatin (MEVACOR) 20 MG tablet, Take 1 tablet (20 mg total) by mouth every evening., Disp: 90 tablet, Rfl: 3    mirtazapine (REMERON) 15 MG tablet, Take 1 tablet (15 mg total) by mouth every evening., Disp: 90 tablet, Rfl: 3    Review of Systems   Constitutional:  Positive for activity change. Negative for appetite change, chills, fatigue, fever and unexpected weight change.   HENT:   "Negative for hearing loss, rhinorrhea, sore throat and trouble swallowing.    Eyes:  Negative for discharge and visual disturbance.   Respiratory:  Negative for cough, chest tightness, shortness of breath and wheezing.    Cardiovascular:  Negative for chest pain, palpitations and leg swelling.   Gastrointestinal:  Negative for abdominal pain, blood in stool, constipation, diarrhea, nausea and vomiting.   Endocrine: Negative for polydipsia and polyuria.   Genitourinary:  Negative for difficulty urinating, dysuria, frequency, hematuria and menstrual problem.   Musculoskeletal:  Negative for arthralgias, back pain, gait problem, joint swelling and neck pain.   Skin:  Negative for rash.   Neurological:  Negative for dizziness, tremors, seizures, weakness, numbness and headaches.   Psychiatric/Behavioral:  Negative for confusion, dysphoric mood, sleep disturbance (Stable on med) and suicidal ideas. The patient is not nervous/anxious.           Objective:      Vitals:    08/26/24 1412   BP: 136/80   Pulse: 90   SpO2: 99%   Weight: 47.2 kg (104 lb)   Height: 4' 11" (1.499 m)     Physical Exam  Vitals reviewed.   Constitutional:       General: She is not in acute distress.     Appearance: Normal appearance. She is well-developed and normal weight.      Comments: Healthy appearing white female, appears younger than stated age   HENT:      Head: Normocephalic and atraumatic.      Right Ear: Tympanic membrane and ear canal normal.      Left Ear: Tympanic membrane and ear canal normal.   Neck:      Vascular: No carotid bruit.   Cardiovascular:      Rate and Rhythm: Normal rate and regular rhythm.      Heart sounds: No murmur heard.  Pulmonary:      Effort: Pulmonary effort is normal. No respiratory distress.      Breath sounds: Normal breath sounds. No wheezing or rales.   Chest:      Comments: Midsternal surgical incision well healed  Abdominal:      General: There is no distension.      Palpations: Abdomen is soft.      " Tenderness: There is no abdominal tenderness.   Musculoskeletal:      Cervical back: Neck supple.      Right lower leg: No edema.      Left lower leg: No edema.   Lymphadenopathy:      Cervical: No cervical adenopathy.   Skin:     General: Skin is warm and dry.      Findings: No rash.   Neurological:      General: No focal deficit present.      Mental Status: She is alert and oriented to person, place, and time.      Gait: Gait normal.   Psychiatric:         Mood and Affect: Mood normal.           Assessment:       1. Essential hypertension    2. Mixed hyperlipidemia    3. Type 1 dissection of thoracic aorta    4. S/P aortic dissection repair    5. Primary insomnia           Plan:       1. Essential hypertension   -BP well controlled    2. Mixed hyperlipidemia  -discussed with pt LDL goal of <70 given vascular disease. She has lipids ordered by Dr. Mcdermott to be drawn  -     lovastatin (MEVACOR) 20 MG tablet; Take 1 tablet (20 mg total) by mouth every evening.  Dispense: 90 tablet; Refill: 3    3. Type 1 dissection of thoracic aorta   -doing very well s/p repair    4. S/P aortic dissection repair    5. Primary insomnia  -stable on med  -     mirtazapine (REMERON) 15 MG tablet; Take 1 tablet (15 mg total) by mouth every evening.  Dispense: 90 tablet; Refill: 3      Follow up in about 6 months (around 2/26/2025) for HTN, lipids.          Counseled on age and gender appropriate medical preventative services, including cancer screenings, immunizations, overall nutritional health, need for a consistent exercise regimen and an overall push towards maintaining a vigorous and active lifestyle.      8/26/2024 Jennyfer Hernandez NP

## 2024-08-27 ENCOUNTER — CLINICAL SUPPORT (OUTPATIENT)
Dept: CARDIAC REHAB | Facility: HOSPITAL | Age: 74
End: 2024-08-27
Payer: MEDICARE

## 2024-08-27 DIAGNOSIS — Z98.890 S/P ASCENDING AORTIC ANEURYSM REPAIR: Primary | ICD-10-CM

## 2024-08-27 DIAGNOSIS — Z86.79 S/P ASCENDING AORTIC ANEURYSM REPAIR: Primary | ICD-10-CM

## 2024-08-27 PROCEDURE — 93798 PHYS/QHP OP CAR RHAB W/ECG: CPT

## 2024-08-29 ENCOUNTER — CLINICAL SUPPORT (OUTPATIENT)
Dept: CARDIAC REHAB | Facility: HOSPITAL | Age: 74
End: 2024-08-29
Payer: MEDICARE

## 2024-08-29 DIAGNOSIS — Z98.890 S/P ASCENDING AORTIC ANEURYSM REPAIR: Primary | ICD-10-CM

## 2024-08-29 DIAGNOSIS — Z86.79 S/P ASCENDING AORTIC ANEURYSM REPAIR: Primary | ICD-10-CM

## 2024-08-29 PROCEDURE — 93798 PHYS/QHP OP CAR RHAB W/ECG: CPT

## 2024-09-03 ENCOUNTER — CLINICAL SUPPORT (OUTPATIENT)
Dept: CARDIAC REHAB | Facility: HOSPITAL | Age: 74
End: 2024-09-03
Payer: MEDICARE

## 2024-09-03 DIAGNOSIS — Z98.890 S/P ASCENDING AORTIC ANEURYSM REPAIR: Primary | ICD-10-CM

## 2024-09-03 DIAGNOSIS — Z86.79 S/P ASCENDING AORTIC ANEURYSM REPAIR: Primary | ICD-10-CM

## 2024-09-03 PROCEDURE — 93798 PHYS/QHP OP CAR RHAB W/ECG: CPT

## 2024-09-09 ENCOUNTER — LAB VISIT (OUTPATIENT)
Dept: LAB | Facility: HOSPITAL | Age: 74
End: 2024-09-09
Attending: INTERNAL MEDICINE
Payer: MEDICARE

## 2024-09-09 DIAGNOSIS — E78.2 MIXED HYPERLIPIDEMIA: ICD-10-CM

## 2024-09-09 DIAGNOSIS — Z98.890 S/P ASCENDING AORTIC ANEURYSM REPAIR: ICD-10-CM

## 2024-09-09 DIAGNOSIS — Z86.79 S/P ASCENDING AORTIC ANEURYSM REPAIR: ICD-10-CM

## 2024-09-09 LAB
CHOLEST SERPL-MCNC: 152 MG/DL (ref 120–199)
CHOLEST/HDLC SERPL: 2.2 {RATIO} (ref 2–5)
HDLC SERPL-MCNC: 68 MG/DL (ref 40–75)
HDLC SERPL: 44.7 % (ref 20–50)
LDLC SERPL CALC-MCNC: 66.4 MG/DL (ref 63–159)
NONHDLC SERPL-MCNC: 84 MG/DL
TRIGL SERPL-MCNC: 88 MG/DL (ref 30–150)

## 2024-09-09 PROCEDURE — 80061 LIPID PANEL: CPT | Performed by: INTERNAL MEDICINE

## 2024-09-09 PROCEDURE — 36415 COLL VENOUS BLD VENIPUNCTURE: CPT | Performed by: INTERNAL MEDICINE

## 2024-09-12 ENCOUNTER — CLINICAL SUPPORT (OUTPATIENT)
Dept: CARDIAC REHAB | Facility: HOSPITAL | Age: 74
End: 2024-09-12
Payer: MEDICARE

## 2024-09-12 DIAGNOSIS — Z98.890 S/P ASCENDING AORTIC ANEURYSM REPAIR: Primary | ICD-10-CM

## 2024-09-12 DIAGNOSIS — Z86.79 S/P ASCENDING AORTIC ANEURYSM REPAIR: Primary | ICD-10-CM

## 2024-09-12 PROCEDURE — 93798 PHYS/QHP OP CAR RHAB W/ECG: CPT

## 2024-09-17 ENCOUNTER — CLINICAL SUPPORT (OUTPATIENT)
Dept: CARDIAC REHAB | Facility: HOSPITAL | Age: 74
End: 2024-09-17
Payer: MEDICARE

## 2024-09-17 DIAGNOSIS — Z98.890 S/P ASCENDING AORTIC ANEURYSM REPAIR: Primary | ICD-10-CM

## 2024-09-17 DIAGNOSIS — Z86.79 S/P ASCENDING AORTIC ANEURYSM REPAIR: Primary | ICD-10-CM

## 2024-09-17 PROCEDURE — 93798 PHYS/QHP OP CAR RHAB W/ECG: CPT

## 2024-09-19 ENCOUNTER — CLINICAL SUPPORT (OUTPATIENT)
Dept: CARDIAC REHAB | Facility: HOSPITAL | Age: 74
End: 2024-09-19
Payer: MEDICARE

## 2024-09-19 DIAGNOSIS — Z86.79 S/P ASCENDING AORTIC ANEURYSM REPAIR: Primary | ICD-10-CM

## 2024-09-19 DIAGNOSIS — Z98.890 S/P ASCENDING AORTIC ANEURYSM REPAIR: Primary | ICD-10-CM

## 2024-09-19 PROCEDURE — 93798 PHYS/QHP OP CAR RHAB W/ECG: CPT

## 2024-09-24 ENCOUNTER — CLINICAL SUPPORT (OUTPATIENT)
Dept: CARDIAC REHAB | Facility: HOSPITAL | Age: 74
End: 2024-09-24
Payer: MEDICARE

## 2024-09-24 DIAGNOSIS — Z86.79 S/P ASCENDING AORTIC ANEURYSM REPAIR: Primary | ICD-10-CM

## 2024-09-24 DIAGNOSIS — Z98.890 S/P ASCENDING AORTIC ANEURYSM REPAIR: Primary | ICD-10-CM

## 2024-09-24 PROCEDURE — 93798 PHYS/QHP OP CAR RHAB W/ECG: CPT

## 2024-09-26 ENCOUNTER — OFFICE VISIT (OUTPATIENT)
Dept: CARDIOLOGY | Facility: CLINIC | Age: 74
End: 2024-09-26
Payer: MEDICARE

## 2024-09-26 VITALS
DIASTOLIC BLOOD PRESSURE: 81 MMHG | HEART RATE: 78 BPM | SYSTOLIC BLOOD PRESSURE: 127 MMHG | WEIGHT: 103.63 LBS | HEIGHT: 59 IN | BODY MASS INDEX: 20.89 KG/M2

## 2024-09-26 DIAGNOSIS — E78.2 MIXED HYPERLIPIDEMIA: ICD-10-CM

## 2024-09-26 DIAGNOSIS — I10 HYPERTENSION, UNSPECIFIED TYPE: Primary | ICD-10-CM

## 2024-09-26 DIAGNOSIS — Z98.890 S/P AORTIC DISSECTION REPAIR: ICD-10-CM

## 2024-09-26 PROCEDURE — 99214 OFFICE O/P EST MOD 30 MIN: CPT | Mod: S$GLB,,, | Performed by: INTERNAL MEDICINE

## 2024-09-26 PROCEDURE — 1160F RVW MEDS BY RX/DR IN RCRD: CPT | Mod: CPTII,S$GLB,, | Performed by: INTERNAL MEDICINE

## 2024-09-26 PROCEDURE — 4010F ACE/ARB THERAPY RXD/TAKEN: CPT | Mod: CPTII,S$GLB,, | Performed by: INTERNAL MEDICINE

## 2024-09-26 PROCEDURE — 3008F BODY MASS INDEX DOCD: CPT | Mod: CPTII,S$GLB,, | Performed by: INTERNAL MEDICINE

## 2024-09-26 PROCEDURE — 99999 PR PBB SHADOW E&M-EST. PATIENT-LVL III: CPT | Mod: PBBFAC,,, | Performed by: INTERNAL MEDICINE

## 2024-09-26 PROCEDURE — 3288F FALL RISK ASSESSMENT DOCD: CPT | Mod: CPTII,S$GLB,, | Performed by: INTERNAL MEDICINE

## 2024-09-26 PROCEDURE — 3066F NEPHROPATHY DOC TX: CPT | Mod: CPTII,S$GLB,, | Performed by: INTERNAL MEDICINE

## 2024-09-26 PROCEDURE — 1101F PT FALLS ASSESS-DOCD LE1/YR: CPT | Mod: CPTII,S$GLB,, | Performed by: INTERNAL MEDICINE

## 2024-09-26 PROCEDURE — 3061F NEG MICROALBUMINURIA REV: CPT | Mod: CPTII,S$GLB,, | Performed by: INTERNAL MEDICINE

## 2024-09-26 PROCEDURE — 3079F DIAST BP 80-89 MM HG: CPT | Mod: CPTII,S$GLB,, | Performed by: INTERNAL MEDICINE

## 2024-09-26 PROCEDURE — 1159F MED LIST DOCD IN RCRD: CPT | Mod: CPTII,S$GLB,, | Performed by: INTERNAL MEDICINE

## 2024-09-26 PROCEDURE — 3074F SYST BP LT 130 MM HG: CPT | Mod: CPTII,S$GLB,, | Performed by: INTERNAL MEDICINE

## 2024-09-26 PROCEDURE — 1126F AMNT PAIN NOTED NONE PRSNT: CPT | Mod: CPTII,S$GLB,, | Performed by: INTERNAL MEDICINE

## 2024-09-26 RX ORDER — AMLODIPINE BESYLATE 5 MG/1
5 TABLET ORAL DAILY
Qty: 90 TABLET | Refills: 2 | Status: SHIPPED | OUTPATIENT
Start: 2024-09-26 | End: 2025-06-23

## 2024-09-26 NOTE — PROGRESS NOTES
Subjective:    Patient ID:  Tanika Olivera is a 73 y.o. female patient here for evaluation Results, Hypertension, and Hyperlipidemia    Follow up visit 09/26/2024:  Came for follow up of hypertension blood.  Home BP log reviewed.  Blood pressure very well controlled.  Denies any symptoms.    History of Present Illness during initial clinic visit:     73 year old female came here for initial evaluation and establishment of care.  Patient with past medical history of hypertension, hyperlipidemia, hepatitis-C status post treatment.  Patient recently admitted to [facility] with type A aortic dissection and was transferred to [facility] for emergent surgical repair.  Aortic dissection appeared to be acute on chronic per surgical report and there was evidence of ascending aortic aneurysm of 4.7 cm on CT.  Patient stated her blood pressure was not well controlled prior to this episode.  After the surgery, her lisinopril was discontinued and metoprolol dose was reduced due to soft blood pressure however she is not checking blood pressures regularly at home currently.  She follows low-salt diet.  Denies family history of aortic dissection.  Physically active at baseline and denies anginal symptoms      Review of patient's allergies indicates:  No Known Allergies    Past Medical History:   Diagnosis Date    History of hepatitis C, s/p successful Rx w/ SVR24 (cure) - 11/2018 2/23/2017    S/p epclusa w/ SVR    HTN (hypertension)     Hyperlipidemia     Osteoporosis      Past Surgical History:   Procedure Laterality Date    BREAST SURGERY  02/13/2007    Breast implants    CARDIAC VALVE REPLACEMENT  6/27/24    COLONOSCOPY N/A 07/05/2022    Procedure: COLONOSCOPY;  Surgeon: Fran Palomares MD;  Location: [facility];  Service: Endoscopy;  Laterality: N/A;    CORONARY ARTERY BYPASS GRAFT  6/27/24    EYE SURGERY  4/17/2019    Cataract surgery    REPAIR OF ASCENDING AORTA N/A 06/27/2024    Procedure: ASCENDING AORTA  REPAIR;  Surgeon: Jean Paul Sharma MD;  Location: [facility] OR [address] [place] [address];  Service: Cardiovascular;  Laterality: N/A;  Type A Dissection repair, Hemiarch replacement 28 mm graft , Deep hypothermic circulatory arrest    TOTAL ABDOMINAL HYSTERECTOMY       Social History     Tobacco Use    Smoking status: Never     Passive exposure: Never    Smokeless tobacco: Never   Substance Use Topics    Alcohol use: No    Drug use: No        Review of Systems   Negative except as mentioned in HPI         Objective        Vitals:    09/26/24 1403   BP: 127/81   Pulse: 78       LIPIDS - LAST 2   Lab Results   Component Value Date    CHOL 152 09/09/2024    CHOL 208 (H) 02/14/2024    HDL 68 09/09/2024    HDL 97 02/14/2024    LDLCALC 66.4 09/09/2024    LDLCALC 94 02/14/2024    TRIG 88 09/09/2024    TRIG 84 02/14/2024    CHOLHDL 44.7 09/09/2024    CHOLHDL 2.1 02/14/2024       CBC - LAST 2  Lab Results   Component Value Date    WBC 7.50 07/02/2024    WBC 8.91 07/01/2024    RBC 3.26 (L) 07/02/2024    RBC 3.31 (L) 07/01/2024    HGB 9.8 (L) 07/02/2024    HGB 10.2 (L) 07/01/2024    HCT 29.8 (L) 07/02/2024    HCT 29.8 (L) 07/01/2024    MCV 91 07/02/2024    MCV 90 07/01/2024    MCH 30.1 07/02/2024    MCH 30.8 07/01/2024    MCHC 32.9 07/02/2024    MCHC 34.2 07/01/2024    RDW 13.2 07/02/2024    RDW 13.1 07/01/2024     07/02/2024     07/01/2024    MPV 9.6 07/02/2024    MPV 9.9 07/01/2024    GRAN 9.2 (H) 06/27/2024    GRAN 86.1 (H) 06/27/2024    LYMPH 0.3 (L) 06/27/2024    LYMPH 2.9 (L) 06/27/2024    MONO 1.1 (H) 06/27/2024    MONO 10.2 06/27/2024    BASO 0.01 06/27/2024    BASO 0.01 06/27/2024    NRBC 0 06/27/2024    NRBC 0 06/27/2024       CHEMISTRY & LIVER FUNCTION - LAST 2  Lab Results   Component Value Date     (L) 07/02/2024     (L) 07/01/2024    K 4.7 07/02/2024    K 3.8 07/01/2024    CL 99 07/02/2024    CL 97 07/01/2024    CO2 29 07/02/2024    CO2 29 07/01/2024    ANIONGAP 6 (L) 07/02/2024    ANIONGAP 9 07/01/2024    BUN  12 07/02/2024    BUN 13 07/01/2024    CREATININE 0.6 07/02/2024    CREATININE 0.6 07/01/2024     07/02/2024     07/01/2024    CALCIUM 8.8 07/02/2024    CALCIUM 8.7 07/01/2024    PH 7.445 06/29/2024    PH 7.471 (H) 06/28/2024    MG 2.0 07/02/2024    MG 2.0 07/01/2024    ALBUMIN 2.7 (L) 07/02/2024    ALBUMIN 2.6 (L) 07/01/2024    PROT 6.0 07/02/2024    PROT 5.9 (L) 07/01/2024    ALKPHOS 52 (L) 07/02/2024    ALKPHOS 61 07/01/2024    ALT 19 07/02/2024    ALT 20 07/01/2024    AST 35 07/02/2024    AST 38 07/01/2024    BILITOT 0.5 07/02/2024    BILITOT 0.5 07/01/2024        CARDIAC PROFILE - LAST 2  Lab Results   Component Value Date     (H) 06/25/2024    TROPONINIHS 8.3 06/25/2024    TROPONINIHS 7.5 06/25/2024        COAGULATION - LAST 2  Lab Results   Component Value Date    INR 1.0 06/30/2024    INR 1.0 06/29/2024    APTT 27.0 07/01/2024    APTT 29.0 06/30/2024       ENDOCRINE & PSA - LAST 2  Lab Results   Component Value Date    MICROALBUR 0.2 02/14/2024    MICROALBUR 0.4 02/01/2023    TSH 3.42 05/01/2020        ECHOCARDIOGRAM RESULTS  Results for orders placed during the hospital encounter of 06/25/24    Echo    Interpretation Summary    Left Ventricle: The left ventricle is normal in size. Ventricular mass is normal. Normal wall thickness. Septal motion is abnormal. There is intermittent septal shift into the left ventricle with expansion of the right ventricle. There is annulus reversus. Consider constrictive physiology. There is normal systolic function with a visually estimated ejection fraction of 55 - 60%. There is normal diastolic function.    Right Ventricle: Normal right ventricular cavity size. Wall thickness is normal. Systolic function is normal.    Mitral Valve: There is mild regurgitation.    Aorta: Prosthetic graft present in the ascending aorta.    Pericardium: Left pleural effusion.    OPERATIONS:  1. Emergent repair of type A aortic dissection under hypothermic circulatory  arrest.  2. Replacement of ascending and hemiarch using a 28 size Gelweave graft.      CURRENT/PREVIOUS VISIT EKG  Results for orders placed or performed in visit on 08/09/24   IN OFFICE EKG 12-LEAD (to [place])    Collection Time: 08/09/24  2:23 PM   Result Value Ref Range    QRS Duration 66 ms    [facility] QTC Calculation 443 ms    Narrative    Test Reason : Z98.890,Z86.79,    Vent. Rate : 082 BPM     Atrial Rate : 082 BPM     P-R Int : 134 ms          QRS Dur : 066 ms      QT Int : 380 ms       P-R-T Axes : 058 031 -05 degrees     QTc Int : 443 ms    Normal sinus rhythm  Nonspecific T wave abnormality  Abnormal ECG  When compared with ECG of 22-JUL-2024 10:48,  QT has shortened  Confirmed by La FARNSWORTH, Bryce WELCH ([id number]) on 8/19/2024 8:53:06 PM    Referred By: AMISHA BAILEY           Confirmed By:Bryce Tovar MD     No valid procedures specified.   No results found for this or any previous visit.    No valid procedures specified.          PREVIOUS STRESS TEST              PREVIOUS ANGIOGRAM        PHYSICAL EXAM    CONSTITUTIONAL: Well built, well nourished in no apparent distress  HEENT: No pallor  NECK: no JVD  LUNGS: CTA b/l  HEART: regular rate and rhythm, S1, S2 normal, no murmur   ABDOMEN:  Nondistended  EXTREMITIES: No edema  NEURO: AAO X 3   SKIN:  No rash  Psych:  Normal affect    I HAVE REVIEWED :    The vital signs, nurses notes, and all the pertinent radiology and labs.        Current Outpatient Medications   Medication Instructions    alendronate (FOSAMAX) 70 mg, Oral, Every 7 days, take on empty stomach with full glass of water/do not eat or lie down for 1 hour after. For osteopenia    amLODIPine (NORVASC) 5 mg, Oral, Daily    aspirin (ECOTRIN) 325 mg, Oral, Daily    calcium carbonate (OS-KYLAH) 500 mg calcium (1,250 mg) tablet 1 tablet, Oral, Daily    cholecalciferol, vitamin D3, (VITAMIN D3) 2,000 unit Tab 1 tablet, Oral, Daily    estradioL (ESTRACE) 2 g, Vaginal, Every 7 days    fish oil-omega-3 fatty  acids 300-1,000 mg capsule 2 g, Oral, Daily    lovastatin (MEVACOR) 20 mg, Oral, Nightly    metoprolol succinate (TOPROL-XL) 50 mg, Oral, Daily    mirtazapine (REMERON) 15 mg, Oral, Nightly    MULTIVITAMIN (MULTIPLE VITAMIN ORAL) 1 tablet, Oral, Daily       Assessment & Plan     Recent Type A aortic dissection status post emergent surgical repair  Hypertension-well controlled after starting amlodipine and switching Lopressor to Toprol-XL during last clinic visit.  Home BP logs reviewed.  Systolic blood pressure ranging between 100-110 and sometimes in 90s.  Patient denies any dizziness.  Diastolic blood pressure in 60s..  Normal ejection fraction on recent echo  Dyslipidemia-well controlled. LDL less than 70  History of hep C status post treatment    Plan:  Discussed the importance of strict blood pressure control  Blood Pressure well controlled on current regimen.  Continue amlodipine 5 mg daily.  Medication refilled.  Continue Toprol-XL 50 mg daily  Continue home BP monitoring with a BP log  Low-sodium diet  Continue lovastatin and aspirin 81 mg daily.  Follow up in about 6 months (around 3/26/2025).

## 2024-10-01 ENCOUNTER — CLINICAL SUPPORT (OUTPATIENT)
Dept: CARDIAC REHAB | Facility: HOSPITAL | Age: 74
End: 2024-10-01
Payer: MEDICARE

## 2024-10-01 DIAGNOSIS — Z86.79 S/P ASCENDING AORTIC ANEURYSM REPAIR: Primary | ICD-10-CM

## 2024-10-01 DIAGNOSIS — Z98.890 S/P ASCENDING AORTIC ANEURYSM REPAIR: Primary | ICD-10-CM

## 2024-10-01 PROCEDURE — 93798 PHYS/QHP OP CAR RHAB W/ECG: CPT

## 2024-10-03 ENCOUNTER — CLINICAL SUPPORT (OUTPATIENT)
Dept: CARDIAC REHAB | Facility: HOSPITAL | Age: 74
End: 2024-10-03
Payer: MEDICARE

## 2024-10-03 DIAGNOSIS — Z98.890 S/P ASCENDING AORTIC ANEURYSM REPAIR: Primary | ICD-10-CM

## 2024-10-03 DIAGNOSIS — Z86.79 S/P ASCENDING AORTIC ANEURYSM REPAIR: Primary | ICD-10-CM

## 2024-10-03 PROCEDURE — 93798 PHYS/QHP OP CAR RHAB W/ECG: CPT

## 2024-10-08 ENCOUNTER — CLINICAL SUPPORT (OUTPATIENT)
Dept: CARDIAC REHAB | Facility: HOSPITAL | Age: 74
End: 2024-10-08
Payer: MEDICARE

## 2024-10-08 DIAGNOSIS — Z86.79 S/P ASCENDING AORTIC ANEURYSM REPAIR: Primary | ICD-10-CM

## 2024-10-08 DIAGNOSIS — Z98.890 S/P ASCENDING AORTIC ANEURYSM REPAIR: Primary | ICD-10-CM

## 2024-10-08 PROCEDURE — 93798 PHYS/QHP OP CAR RHAB W/ECG: CPT

## 2024-10-10 ENCOUNTER — CLINICAL SUPPORT (OUTPATIENT)
Dept: CARDIAC REHAB | Facility: HOSPITAL | Age: 74
End: 2024-10-10
Payer: MEDICARE

## 2024-10-10 DIAGNOSIS — Z86.79 S/P ASCENDING AORTIC ANEURYSM REPAIR: Primary | ICD-10-CM

## 2024-10-10 DIAGNOSIS — Z98.890 S/P ASCENDING AORTIC ANEURYSM REPAIR: Primary | ICD-10-CM

## 2024-10-10 PROCEDURE — 93798 PHYS/QHP OP CAR RHAB W/ECG: CPT

## 2024-10-15 ENCOUNTER — CLINICAL SUPPORT (OUTPATIENT)
Dept: CARDIAC REHAB | Facility: HOSPITAL | Age: 74
End: 2024-10-15
Payer: MEDICARE

## 2024-10-15 DIAGNOSIS — Z98.890 S/P ASCENDING AORTIC ANEURYSM REPAIR: Primary | ICD-10-CM

## 2024-10-15 DIAGNOSIS — Z86.79 S/P ASCENDING AORTIC ANEURYSM REPAIR: Primary | ICD-10-CM

## 2024-10-15 PROCEDURE — 93798 PHYS/QHP OP CAR RHAB W/ECG: CPT

## 2024-10-17 ENCOUNTER — CLINICAL SUPPORT (OUTPATIENT)
Dept: CARDIAC REHAB | Facility: HOSPITAL | Age: 74
End: 2024-10-17
Payer: MEDICARE

## 2024-10-17 DIAGNOSIS — Z98.890 S/P ASCENDING AORTIC ANEURYSM REPAIR: Primary | ICD-10-CM

## 2024-10-17 DIAGNOSIS — Z86.79 S/P ASCENDING AORTIC ANEURYSM REPAIR: Primary | ICD-10-CM

## 2024-10-17 PROCEDURE — 93798 PHYS/QHP OP CAR RHAB W/ECG: CPT

## 2024-10-22 ENCOUNTER — CLINICAL SUPPORT (OUTPATIENT)
Dept: CARDIAC REHAB | Facility: HOSPITAL | Age: 74
End: 2024-10-22
Payer: MEDICARE

## 2024-10-22 DIAGNOSIS — Z86.79 S/P ASCENDING AORTIC ANEURYSM REPAIR: Primary | ICD-10-CM

## 2024-10-22 DIAGNOSIS — Z98.890 S/P ASCENDING AORTIC ANEURYSM REPAIR: Primary | ICD-10-CM

## 2024-10-22 PROCEDURE — 93798 PHYS/QHP OP CAR RHAB W/ECG: CPT

## 2024-10-24 ENCOUNTER — CLINICAL SUPPORT (OUTPATIENT)
Dept: CARDIAC REHAB | Facility: HOSPITAL | Age: 74
End: 2024-10-24
Payer: MEDICARE

## 2024-10-24 DIAGNOSIS — Z98.890 S/P ASCENDING AORTIC ANEURYSM REPAIR: Primary | ICD-10-CM

## 2024-10-24 DIAGNOSIS — Z86.79 S/P ASCENDING AORTIC ANEURYSM REPAIR: Primary | ICD-10-CM

## 2024-10-24 PROCEDURE — 93798 PHYS/QHP OP CAR RHAB W/ECG: CPT

## 2024-10-29 ENCOUNTER — CLINICAL SUPPORT (OUTPATIENT)
Dept: CARDIAC REHAB | Facility: HOSPITAL | Age: 74
End: 2024-10-29
Payer: MEDICARE

## 2024-10-29 DIAGNOSIS — Z98.890 S/P ASCENDING AORTIC ANEURYSM REPAIR: Primary | ICD-10-CM

## 2024-10-29 DIAGNOSIS — Z86.79 S/P ASCENDING AORTIC ANEURYSM REPAIR: Primary | ICD-10-CM

## 2024-10-29 PROCEDURE — 93798 PHYS/QHP OP CAR RHAB W/ECG: CPT

## 2024-10-31 ENCOUNTER — CLINICAL SUPPORT (OUTPATIENT)
Dept: CARDIAC REHAB | Facility: HOSPITAL | Age: 74
End: 2024-10-31
Payer: MEDICARE

## 2024-10-31 DIAGNOSIS — Z98.890 S/P ASCENDING AORTIC ANEURYSM REPAIR: Primary | ICD-10-CM

## 2024-10-31 DIAGNOSIS — Z86.79 S/P ASCENDING AORTIC ANEURYSM REPAIR: Primary | ICD-10-CM

## 2024-10-31 PROCEDURE — 93798 PHYS/QHP OP CAR RHAB W/ECG: CPT

## 2024-11-05 ENCOUNTER — CLINICAL SUPPORT (OUTPATIENT)
Dept: CARDIAC REHAB | Facility: HOSPITAL | Age: 74
End: 2024-11-05
Payer: MEDICARE

## 2024-11-05 DIAGNOSIS — Z98.890 S/P ASCENDING AORTIC ANEURYSM REPAIR: Primary | ICD-10-CM

## 2024-11-05 DIAGNOSIS — Z86.79 S/P ASCENDING AORTIC ANEURYSM REPAIR: Primary | ICD-10-CM

## 2024-11-05 PROCEDURE — 93798 PHYS/QHP OP CAR RHAB W/ECG: CPT

## 2024-11-07 ENCOUNTER — CLINICAL SUPPORT (OUTPATIENT)
Dept: CARDIAC REHAB | Facility: HOSPITAL | Age: 74
End: 2024-11-07
Payer: MEDICARE

## 2024-11-07 DIAGNOSIS — Z98.890 S/P ASCENDING AORTIC ANEURYSM REPAIR: Primary | ICD-10-CM

## 2024-11-07 DIAGNOSIS — Z86.79 S/P ASCENDING AORTIC ANEURYSM REPAIR: Primary | ICD-10-CM

## 2024-11-07 PROCEDURE — 93798 PHYS/QHP OP CAR RHAB W/ECG: CPT

## 2024-11-12 ENCOUNTER — CLINICAL SUPPORT (OUTPATIENT)
Dept: CARDIAC REHAB | Facility: HOSPITAL | Age: 74
End: 2024-11-12
Payer: MEDICARE

## 2024-11-12 DIAGNOSIS — Z86.79 S/P ASCENDING AORTIC ANEURYSM REPAIR: Primary | ICD-10-CM

## 2024-11-12 DIAGNOSIS — Z98.890 S/P ASCENDING AORTIC ANEURYSM REPAIR: Primary | ICD-10-CM

## 2024-11-12 PROCEDURE — 93798 PHYS/QHP OP CAR RHAB W/ECG: CPT

## 2024-11-14 ENCOUNTER — CLINICAL SUPPORT (OUTPATIENT)
Dept: CARDIAC REHAB | Facility: HOSPITAL | Age: 74
End: 2024-11-14
Payer: MEDICARE

## 2024-11-14 DIAGNOSIS — Z98.890 S/P ASCENDING AORTIC ANEURYSM REPAIR: Primary | ICD-10-CM

## 2024-11-14 DIAGNOSIS — Z86.79 S/P ASCENDING AORTIC ANEURYSM REPAIR: Primary | ICD-10-CM

## 2024-11-14 PROCEDURE — 93798 PHYS/QHP OP CAR RHAB W/ECG: CPT

## 2024-11-19 ENCOUNTER — CLINICAL SUPPORT (OUTPATIENT)
Dept: CARDIAC REHAB | Facility: HOSPITAL | Age: 74
End: 2024-11-19
Payer: MEDICARE

## 2024-11-19 DIAGNOSIS — Z86.79 S/P ASCENDING AORTIC ANEURYSM REPAIR: Primary | ICD-10-CM

## 2024-11-19 DIAGNOSIS — Z98.890 S/P ASCENDING AORTIC ANEURYSM REPAIR: Primary | ICD-10-CM

## 2024-11-19 PROCEDURE — 93798 PHYS/QHP OP CAR RHAB W/ECG: CPT

## 2024-11-21 ENCOUNTER — CLINICAL SUPPORT (OUTPATIENT)
Dept: CARDIAC REHAB | Facility: HOSPITAL | Age: 74
End: 2024-11-21
Payer: MEDICARE

## 2024-11-21 DIAGNOSIS — Z98.890 S/P ASCENDING AORTIC ANEURYSM REPAIR: Primary | ICD-10-CM

## 2024-11-21 DIAGNOSIS — Z86.79 S/P ASCENDING AORTIC ANEURYSM REPAIR: Primary | ICD-10-CM

## 2024-11-21 PROCEDURE — 94626 PHY/QHP OP PULM RHB W/MNTR: CPT

## 2024-11-26 ENCOUNTER — CLINICAL SUPPORT (OUTPATIENT)
Dept: CARDIAC REHAB | Facility: HOSPITAL | Age: 74
End: 2024-11-26
Payer: MEDICARE

## 2024-11-26 DIAGNOSIS — Z98.890 S/P ASCENDING AORTIC ANEURYSM REPAIR: Primary | ICD-10-CM

## 2024-11-26 DIAGNOSIS — Z86.79 S/P ASCENDING AORTIC ANEURYSM REPAIR: Primary | ICD-10-CM

## 2024-11-26 PROCEDURE — 93798 PHYS/QHP OP CAR RHAB W/ECG: CPT

## 2024-12-03 ENCOUNTER — CLINICAL SUPPORT (OUTPATIENT)
Dept: CARDIAC REHAB | Facility: HOSPITAL | Age: 74
End: 2024-12-03
Payer: MEDICARE

## 2024-12-03 DIAGNOSIS — Z98.890 S/P ASCENDING AORTIC ANEURYSM REPAIR: Primary | ICD-10-CM

## 2024-12-03 DIAGNOSIS — Z86.79 S/P ASCENDING AORTIC ANEURYSM REPAIR: Primary | ICD-10-CM

## 2024-12-03 PROCEDURE — 93798 PHYS/QHP OP CAR RHAB W/ECG: CPT

## 2024-12-05 ENCOUNTER — CLINICAL SUPPORT (OUTPATIENT)
Dept: CARDIAC REHAB | Facility: HOSPITAL | Age: 74
End: 2024-12-05
Payer: MEDICARE

## 2024-12-05 DIAGNOSIS — Z98.890 S/P ASCENDING AORTIC ANEURYSM REPAIR: Primary | ICD-10-CM

## 2024-12-05 DIAGNOSIS — Z86.79 S/P ASCENDING AORTIC ANEURYSM REPAIR: Primary | ICD-10-CM

## 2024-12-05 PROCEDURE — 93798 PHYS/QHP OP CAR RHAB W/ECG: CPT

## 2024-12-10 ENCOUNTER — CLINICAL SUPPORT (OUTPATIENT)
Dept: CARDIAC REHAB | Facility: HOSPITAL | Age: 74
End: 2024-12-10
Payer: MEDICARE

## 2024-12-10 DIAGNOSIS — Z86.79 S/P ASCENDING AORTIC ANEURYSM REPAIR: Primary | ICD-10-CM

## 2024-12-10 DIAGNOSIS — Z98.890 S/P ASCENDING AORTIC ANEURYSM REPAIR: Primary | ICD-10-CM

## 2024-12-10 PROCEDURE — 93798 PHYS/QHP OP CAR RHAB W/ECG: CPT

## 2025-02-10 RX ORDER — METOPROLOL SUCCINATE 50 MG/1
50 TABLET, EXTENDED RELEASE ORAL
Qty: 90 TABLET | Refills: 0 | OUTPATIENT
Start: 2025-02-10

## 2025-02-10 RX ORDER — METOPROLOL SUCCINATE 50 MG/1
50 TABLET, EXTENDED RELEASE ORAL DAILY
Qty: 90 TABLET | Refills: 2 | Status: SHIPPED | OUTPATIENT
Start: 2025-02-10 | End: 2025-08-09

## 2025-02-10 NOTE — TELEPHONE ENCOUNTER
----- Message from Kelvin sent at 2/10/2025 11:09 AM CST -----  Regarding: refill  Contact: : Gianluca  Type:  RX Refill Request    Who Called: : Gianluca  Refill or New Rx:refill  RX Name and Strength:metoprolol succinate (TOPROL-XL) 50 MG 24 hr tablet  How is the patient currently taking it? (ex. 1XDay):  Is this a 30 day or 90 day RX:90  [facility] with phone number[facility] [address] [address]  Phone: [phone] Fax: [phone]  Local or Mail Order:local  Ordering Provider:Anugh  Would the patient rather a call back or a response via [employer]? Patient is out of medication  Best Call Back Number:[phone]  Additional Information:

## 2025-02-11 ENCOUNTER — TELEPHONE (OUTPATIENT)
Dept: FAMILY MEDICINE | Facility: CLINIC | Age: 75
End: 2025-02-11
Payer: MEDICARE

## 2025-02-11 DIAGNOSIS — Z79.899 ENCOUNTER FOR LONG-TERM (CURRENT) USE OF MEDICATIONS: Primary | ICD-10-CM

## 2025-02-11 DIAGNOSIS — E78.2 MIXED HYPERLIPIDEMIA: ICD-10-CM

## 2025-02-11 DIAGNOSIS — I10 ESSENTIAL HYPERTENSION: ICD-10-CM

## 2025-02-25 ENCOUNTER — OFFICE VISIT (OUTPATIENT)
Dept: FAMILY MEDICINE | Facility: CLINIC | Age: 75
End: 2025-02-25
Payer: MEDICARE

## 2025-02-25 VITALS
SYSTOLIC BLOOD PRESSURE: 136 MMHG | HEART RATE: 80 BPM | DIASTOLIC BLOOD PRESSURE: 74 MMHG | OXYGEN SATURATION: 98 % | WEIGHT: 114.19 LBS | HEIGHT: 59 IN | BODY MASS INDEX: 23.02 KG/M2

## 2025-02-25 DIAGNOSIS — Z98.890 S/P AORTIC DISSECTION REPAIR: ICD-10-CM

## 2025-02-25 DIAGNOSIS — M85.88 OSTEOPENIA OF LUMBAR SPINE: ICD-10-CM

## 2025-02-25 DIAGNOSIS — N95.2 ATROPHIC VAGINITIS: ICD-10-CM

## 2025-02-25 DIAGNOSIS — I10 ESSENTIAL HYPERTENSION: Primary | ICD-10-CM

## 2025-02-25 DIAGNOSIS — L30.9 ECZEMA, UNSPECIFIED TYPE: ICD-10-CM

## 2025-02-25 DIAGNOSIS — F51.01 PRIMARY INSOMNIA: ICD-10-CM

## 2025-02-25 DIAGNOSIS — E78.2 MIXED HYPERLIPIDEMIA: ICD-10-CM

## 2025-02-25 PROCEDURE — 3078F DIAST BP <80 MM HG: CPT | Mod: CPTII,S$GLB,, | Performed by: NURSE PRACTITIONER

## 2025-02-25 PROCEDURE — 3288F FALL RISK ASSESSMENT DOCD: CPT | Mod: CPTII,S$GLB,, | Performed by: NURSE PRACTITIONER

## 2025-02-25 PROCEDURE — 1101F PT FALLS ASSESS-DOCD LE1/YR: CPT | Mod: CPTII,S$GLB,, | Performed by: NURSE PRACTITIONER

## 2025-02-25 PROCEDURE — 3061F NEG MICROALBUMINURIA REV: CPT | Mod: CPTII,S$GLB,, | Performed by: NURSE PRACTITIONER

## 2025-02-25 PROCEDURE — 99214 OFFICE O/P EST MOD 30 MIN: CPT | Mod: S$GLB,,, | Performed by: NURSE PRACTITIONER

## 2025-02-25 PROCEDURE — 3066F NEPHROPATHY DOC TX: CPT | Mod: CPTII,S$GLB,, | Performed by: NURSE PRACTITIONER

## 2025-02-25 PROCEDURE — 3075F SYST BP GE 130 - 139MM HG: CPT | Mod: CPTII,S$GLB,, | Performed by: NURSE PRACTITIONER

## 2025-02-25 PROCEDURE — 1160F RVW MEDS BY RX/DR IN RCRD: CPT | Mod: CPTII,S$GLB,, | Performed by: NURSE PRACTITIONER

## 2025-02-25 PROCEDURE — 1159F MED LIST DOCD IN RCRD: CPT | Mod: CPTII,S$GLB,, | Performed by: NURSE PRACTITIONER

## 2025-02-25 PROCEDURE — G2211 COMPLEX E/M VISIT ADD ON: HCPCS | Mod: S$GLB,,, | Performed by: NURSE PRACTITIONER

## 2025-02-25 PROCEDURE — 3008F BODY MASS INDEX DOCD: CPT | Mod: CPTII,S$GLB,, | Performed by: NURSE PRACTITIONER

## 2025-02-25 PROCEDURE — 1126F AMNT PAIN NOTED NONE PRSNT: CPT | Mod: CPTII,S$GLB,, | Performed by: NURSE PRACTITIONER

## 2025-02-25 RX ORDER — ESTRADIOL 0.1 MG/G
2 CREAM VAGINAL
Qty: 42.5 G | Refills: 5 | Status: SHIPPED | OUTPATIENT
Start: 2025-02-25

## 2025-02-25 RX ORDER — TRIAMCINOLONE ACETONIDE 1 MG/G
CREAM TOPICAL 2 TIMES DAILY
Qty: 80 G | Refills: 1 | Status: SHIPPED | OUTPATIENT
Start: 2025-02-25

## 2025-02-25 RX ORDER — LOVASTATIN 40 MG/1
40 TABLET ORAL NIGHTLY
Qty: 90 TABLET | Refills: 3 | Status: SHIPPED | OUTPATIENT
Start: 2025-02-25

## 2025-02-25 RX ORDER — ALENDRONATE SODIUM 70 MG/1
70 TABLET ORAL
Qty: 12 TABLET | Refills: 3 | Status: SHIPPED | OUTPATIENT
Start: 2025-02-25

## 2025-02-25 RX ORDER — MIRTAZAPINE 15 MG/1
15 TABLET, FILM COATED ORAL NIGHTLY
Qty: 90 TABLET | Refills: 3 | Status: SHIPPED | OUTPATIENT
Start: 2025-02-25

## 2025-02-25 NOTE — PROGRESS NOTES
SUBJECTIVE:    Patient ID: Tanika Olivera is a 74 y.o. female.    Chief Complaint: Follow-up (Bottles brought//Pt is here for a check up and medication refills)      History of Present Illness    CHIEF COMPLAINT:  Tanika presents today for six-month follow-up of hypertension, cholesterol, thoracic aortic aneurysm, and osteopenia.    Patient reports overall she has been doing well since last visit.  She is now back at the gym exercising regularly since her emergent aortic aneurysm repair in June, 2024.  She completed going to cardiac rehab    CARDIOVASCULAR:  She established care with cardiologist Dr. Mcdermott in September with follow-up appointment scheduled next month. She reports consistently low blood pressure readings at home, ranging from 110-120 mmHg systolic. She continues Amlodipine 5 mg for blood pressure management.    OSTEOPENIA:  She has been taking alendronate since 2021 for osteopenia management. Bone density scan in June 2023 showed osteopenia with values slightly above osteoporosis threshold    DERMATOLOGIC:  She experiences intermittent itching to bilat upper arms and when she scratches will develop small raised erythematous rash. symptoms typically manifest in the morning and are exacerbated by scratching. She manages symptoms with steroid cream or lotion. She denies severe skin dryness. The condition follows a pattern of improvement and recurrence with scratching.    SLEEP:  She reports improved sleep with Mirtazapine, particularly when combined with increased exercise.      ROS:  General: no fever, no chills, no fatigue, no weight gain, no weight loss  Eyes: no vision changes, no redness, no discharge  ENT: no ear pain, no nasal congestion, no sore throat  Cardiovascular: no chest pain, no palpitations, no lower extremity edema  Respiratory: no cough, no shortness of breath  Gastrointestinal: no abdominal pain, no nausea, no vomiting, no diarrhea, no constipation, no blood in stool  Genitourinary: no  dysuria, no hematuria, no frequency  Musculoskeletal: no joint pain, no muscle pain  Skin: no rash, no lesion, complains of itching  Neurological: no headache, no dizziness, no numbness, no tingling, no weakness  Psychiatric: no anxiety, no depression, no sleep difficulty            Telephone on 02/11/2025   Component Date Value Ref Range Status    WBC 02/19/2025 4.7  3.8 - 10.8 Thousand/uL Final    RBC 02/19/2025 4.64  3.80 - 5.10 Million/uL Final    Hemoglobin 02/19/2025 14.0  11.7 - 15.5 g/dL Final    Hematocrit 02/19/2025 42.2  35.0 - 45.0 % Final    MCV 02/19/2025 90.9  80.0 - 100.0 fL Final    MCH 02/19/2025 30.2  27.0 - 33.0 pg Final    MCHC 02/19/2025 33.2  32.0 - 36.0 g/dL Final    RDW 02/19/2025 13.0  11.0 - 15.0 % Final    Platelets 02/19/2025 392  140 - 400 Thousand/uL Final    MPV 02/19/2025 9.6  7.5 - 12.5 fL Final    Neutrophils, Abs 02/19/2025 1,800  1,500 - 7,800 cells/uL Final    Lymph # 02/19/2025 1,777  850 - 3,900 cells/uL Final    Mono # 02/19/2025 611  200 - 950 cells/uL Final    Eos # 02/19/2025 494  15 - 500 cells/uL Final    Baso # 02/19/2025 19  0 - 200 cells/uL Final    Neutrophils Relative 02/19/2025 38.3  % Final    Lymph % 02/19/2025 37.8  % Final    Mono % 02/19/2025 13.0  % Final    Eosinophil % 02/19/2025 10.5  % Final    Basophil % 02/19/2025 0.4  % Final    Glucose 02/19/2025 95  65 - 99 mg/dL Final    BUN 02/19/2025 14  7 - 25 mg/dL Final    Creatinine 02/19/2025 0.77  0.60 - 1.00 mg/dL Final    eGFR 02/19/2025 82  > OR = 60 mL/min/1.73m2 Final    BUN/Creatinine Ratio 02/19/2025 SEE NOTE:  6 - 22 (calc) Final    Sodium 02/19/2025 138  135 - 146 mmol/L Final    Potassium 02/19/2025 4.5  3.5 - 5.3 mmol/L Final    Chloride 02/19/2025 99  98 - 110 mmol/L Final    CO2 02/19/2025 31  20 - 32 mmol/L Final    Calcium 02/19/2025 9.6  8.6 - 10.4 mg/dL Final    Total Protein 02/19/2025 7.8  6.1 - 8.1 g/dL Final    Albumin 02/19/2025 4.4  3.6 - 5.1 g/dL Final    Globulin, Total 02/19/2025  3.4  1.9 - 3.7 g/dL (calc) Final    Albumin/Globulin Ratio 02/19/2025 1.3  1.0 - 2.5 (calc) Final    Total Bilirubin 02/19/2025 0.5  0.2 - 1.2 mg/dL Final    Alkaline Phosphatase 02/19/2025 53  37 - 153 U/L Final    AST 02/19/2025 22  10 - 35 U/L Final    ALT 02/19/2025 14  6 - 29 U/L Final    Cholesterol 02/19/2025 179  <200 mg/dL Final    HDL 02/19/2025 77  > OR = 50 mg/dL Final    Triglycerides 02/19/2025 89  <150 mg/dL Final    LDL Cholesterol 02/19/2025 84  mg/dL (calc) Final    HDL/Cholesterol Ratio 02/19/2025 2.3  <5.0 (calc) Final    Non HDL Chol. (LDL+VLDL) 02/19/2025 102  <130 mg/dL (calc) Final    Creatinine, Urine 02/19/2025 90  20 - 275 mg/dL Final    Microalb, Ur 02/19/2025 1.0  See Note: mg/dL Final    Microalb/Creat Ratio 02/19/2025 11  <30 mg/g creat Final    TSH w/reflex to FT4 02/19/2025 3.09  0.40 - 4.50 mIU/L Final    Color, UA 02/19/2025 YELLOW  YELLOW Final    Appearance, UA 02/19/2025 CLEAR  CLEAR Final    Specific Gravity, UA 02/19/2025 1.014  1.001 - 1.035 Final    pH, UA 02/19/2025 8.5 (H)  5.0 - 8.0 Final    Glucose, UA 02/19/2025 NEGATIVE  NEGATIVE Final    Bilirubin, UA 02/19/2025 NEGATIVE  NEGATIVE Final    Ketones, UA 02/19/2025 NEGATIVE  NEGATIVE Final    Occult Blood UA 02/19/2025 NEGATIVE  NEGATIVE Final    Protein, UA 02/19/2025 NEGATIVE  NEGATIVE Final    Nitrite, UA 02/19/2025 NEGATIVE  NEGATIVE Final    Leukocytes, UA 02/19/2025 NEGATIVE  NEGATIVE Final    WBC Casts, UA 02/19/2025 NONE SEEN  < OR = 5 /HPF Final    RBC Casts, UA 02/19/2025 0-2  < OR = 2 /HPF Final    Squam Epithel, UA 02/19/2025 NONE SEEN  < OR = 5 /HPF Final    Bacteria, UA 02/19/2025 NONE SEEN  NONE SEEN /HPF Final    Hyaline Casts, UA 02/19/2025 NONE SEEN  NONE SEEN /LPF Final    Service Cmt: 02/19/2025 SEE COMMENT   Final    Reflexive Urine Culture 02/19/2025 SEE COMMENT   Final   Lab Visit on 09/09/2024   Component Date Value Ref Range Status    Cholesterol 09/09/2024 152  120 - 199 mg/dL Final     Triglycerides 09/09/2024 88  30 - 150 mg/dL Final    HDL 09/09/2024 68  40 - 75 mg/dL Final    LDL Cholesterol 09/09/2024 66.4  63.0 - 159.0 mg/dL Final    HDL/Cholesterol Ratio 09/09/2024 44.7  20.0 - 50.0 % Final    Total Cholesterol/HDL Ratio 09/09/2024 2.2  2.0 - 5.0 Final    Non-HDL Cholesterol 09/09/2024 84  mg/dL Final       Past Medical History:   Diagnosis Date    History of hepatitis C, s/p successful Rx w/ SVR24 (cure) - 11/2018 2/23/2017    S/p epclusa w/ SVR    HTN (hypertension)     Hyperlipidemia     Osteoporosis      Past Surgical History:   Procedure Laterality Date    BREAST SURGERY  02/13/2007    Breast implants    CARDIAC VALVE REPLACEMENT  6/27/24    COLONOSCOPY N/A 07/05/2022    Procedure: COLONOSCOPY;  Surgeon: Fran Palomares MD;  Location: [facility];  Service: Endoscopy;  Laterality: N/A;    CORONARY ARTERY BYPASS GRAFT  6/27/24    EYE SURGERY  4/17/2019    Cataract surgery    REPAIR OF ASCENDING AORTA N/A 06/27/2024    Procedure: ASCENDING AORTA REPAIR;  Surgeon: Jean Paul Sharma MD;  Location: [facility] OR [address] [place] [address];  Service: Cardiovascular;  Laterality: N/A;  Type A Dissection repair, Hemiarch replacement 28 mm graft , Deep hypothermic circulatory arrest    TOTAL ABDOMINAL HYSTERECTOMY       Family History   Problem Relation Name Age of Onset    Hypertension Mother Tanika Oniate     Hyperlipidemia Mother Tanika Oniate     Arthritis Mother Tanika Oniate     Stroke Father Sonu Oniate     Depression Father Sonu Oniate     Depression Sister Alice Oniate        All of your core healthy metrics are met.      The 10-year CVD risk score (D'Agostino, et al., 2008) is: 11.1%    Values used to calculate the score:      Age: 74 years      Sex: Female      Diabetic: No      Tobacco smoker: No      Systolic Blood Pressure: 136 mmHg      Is BP treated: Yes      HDL Cholesterol: 77 mg/dL      Total Cholesterol: 179 mg/dL     Marital Status:   Alcohol History:  reports no history of alcohol use.  Tobacco  "History:  reports that she has never smoked. She has never been exposed to tobacco smoke. She has never used smokeless tobacco.  Drug History:  reports no history of drug use.    Health Maintenance Topics with due status: Not Due       Topic Last Completion Date    Colorectal Cancer Screening 07/05/2022    DEXA Scan 06/19/2023    Mammogram 06/20/2024    Lipid Panel 02/19/2025     Immunization History   Administered Date(s) Administered    COVID-19, MRNA, LN-S, PF (Pfizer) (Gray Cap) 06/11/2022    COVID-19, MRNA, LN-S, PF (Pfizer) (Purple Cap) 03/05/2021, 03/26/2021, 10/06/2021    Hepatitis A, Adult 03/02/2017, 09/05/2017    Influenza 12/28/2006, 11/30/2007, 10/24/2008, 11/20/2009, 11/05/2010, 10/24/2011, 11/15/2016, 10/31/2020    Influenza (FLUAD) - Quadrivalent - Adjuvanted - PF *Preferred* (65+) 10/02/2021    Influenza - Quadrivalent - High Dose - PF (65 years and older) 09/10/2020, 10/16/2023    Influenza - Quadrivalent - PF *Preferred* (6 months and older) 10/23/2015, 11/15/2016, 10/26/2017    Influenza - Trivalent - Afluria, Fluzone MDV 11/30/2007, 10/24/2008, 11/20/2009, 10/24/2011    Influenza - Trivalent - Fluarix, Flulaval, Fluzone, Afluria - PF 12/28/2006, 11/05/2010, 10/22/2012, 01/06/2014, 01/12/2015, 11/15/2016    Influenza - Trivalent - Fluzone High Dose - PF (65 years and older) 10/31/2018, 10/07/2019, 11/01/2024    Influenza A (H1N1) 2009 Monovalent - IM 11/20/2009    Pneumococcal Conjugate - 13 Valent 10/31/2018    Pneumococcal Polysaccharide - 23 Valent 01/06/2014, 11/07/2019    RSVpreF (Arexvy) 10/16/2023    Zoster Recombinant 10/07/2019, 12/17/2019       Review of patient's allergies indicates:  No Known Allergies  Current Medications[1]        Objective:      Vitals:    02/25/25 1428 02/25/25 1433 02/25/25 1455   BP: (!) 150/72 (!) 148/78 136/74   Pulse: 80     SpO2: 98%     Weight: 51.8 kg (114 lb 3.2 oz)     Height: 4' 11" (1.499 m)         Physical Exam  Vitals reviewed.   Constitutional:     "   General: She is not in acute distress.     Appearance: Normal appearance. She is well-developed and normal weight.      Comments: Healthy appearing white female, appears younger than stated age   HENT:      Head: Normocephalic and atraumatic.      Right Ear: Tympanic membrane and ear canal normal.      Left Ear: Tympanic membrane and ear canal normal.   Neck:      Vascular: No carotid bruit.   Cardiovascular:      Rate and Rhythm: Normal rate and regular rhythm.      Heart sounds: No murmur heard.  Pulmonary:      Effort: Pulmonary effort is normal. No respiratory distress.      Breath sounds: Normal breath sounds. No wheezing or rales.   Chest:      Comments: Midsternal surgical incision well healed  Abdominal:      General: There is no distension.      Palpations: Abdomen is soft.      Tenderness: There is no abdominal tenderness.   Musculoskeletal:      Cervical back: Neck supple.      Right lower leg: No edema.      Left lower leg: No edema.   Lymphadenopathy:      Cervical: No cervical adenopathy.   Skin:     General: Skin is warm and dry.      Findings: No rash.          Neurological:      General: No focal deficit present.      Mental Status: She is alert and oriented to person, place, and time.      Gait: Gait normal.   Psychiatric:         Mood and Affect: Mood normal.          Assessment:       1. Essential hypertension    2. Mixed hyperlipidemia    3. Osteopenia of lumbar spine    4. S/P aortic dissection repair    5. Primary insomnia    6. Atrophic vaginitis    7. Eczema, unspecified type           Assessment & Plan    - Evaluated patient's hypertension, cholesterol, lipids, osteopenia, and thoracic aortic aneurysm status post emergent repair in June 2024  - Reviewed recent bloodwork showing normalized blood counts, kidney function, electrolytes, and liver enzymes  - Noted slight increase in LDL cholesterol to 84 mg/dL, above goal of <70 mg/dL for post-aneurysm patients  - Will increase lovastatin dose  from 20mg to 40mg daily to optimize LDL control  - Assessed skin condition on arms, likely eczema, and determined short-term topical steroid treatment appropriate  - Considered continuation of alendronate therapy, approaching 5-year david since initiation in 2021  - Planned bone density scan later this year to reassess osteopenia status and guide future treatment    HYPERTENSION:  - Continue amlodipine 5 mg for blood pressure management.  - Tanika's home blood pressure readings are usually 110 to 120 systolic.  - At today's visit, initial reading was high but improved to 136/74 by the end.    THORACIC AORTIC ANEURYSM:  - Explained importance of maintaining LDL cholesterol below 70 mg/dL for post-aneurysm patients.    HYPERLIPIDEMIA:  - Increased lovastatin from 20 mg to 40 mg daily.  - Current cholesterol levels: total cholesterol 179, HDL 77, triglycerides normal, LDL 84.  - LDL is slightly elevated at 84, above the goal of less than 70 for a patient with aneurysm history.  - Ordered cholesterol panel to be completed before next visit in 6 months.    OSTEOPENIA:  - Continued alendronate.  - Last bone density scan in June 2023 showed levels just above osteoporosis, with slight decline from previous scan.  - Plan to repeat bone density scan later this year, after the two-year interval.  - Acknowledged patient's exercise routine as beneficial for bone health.  - Tanika has been taking alendronate (Fosamax) for about 4 years, with plan to continue for approximately one more year and then drug holiday      ECZEMA:  - Assessed raised, slightly erythematous lesions on patient's arms as possible eczema.  - Tanika reports itchy, raised skin lesions that appear when scratched, primarily on arms and previously on neck.  - Prescribed topical steroid cream for 5-7 days (max 14 days).  - Discussed proper use of topical steroids, emphasizing short-term application followed by use of moisturizer.  - Advised regular application of  moisturizer to maintain skin health.    INSOMNIA:  - Continue mirtazapine for sleep.  - Tanika reports sleeping well with mirtazapine, especially in combination with exercise.      FOLLOW UP:  - Follow up in 6 months.  - Contact office if needed before next scheduled visit.        Plan:       1. Essential hypertension    2. Mixed hyperlipidemia  -     lovastatin (MEVACOR) 40 MG tablet; Take 1 tablet (40 mg total) by mouth every evening.  Dispense: 90 tablet; Refill: 3    3. Osteopenia of lumbar spine  Overview:  Started alendronate 2021    Orders:  -     alendronate (FOSAMAX) 70 MG tablet; Take 1 tablet (70 mg total) by mouth every 7 days. take on empty stomach with full glass of water/do not eat or lie down for 1 hour after. For osteopenia  Dispense: 12 tablet; Refill: 3    4. S/P aortic dissection repair    5. Primary insomnia  -     mirtazapine (REMERON) 15 MG tablet; Take 1 tablet (15 mg total) by mouth every evening.  Dispense: 90 tablet; Refill: 3    6. Atrophic vaginitis  -     estradioL (ESTRACE) 0.01 % (0.1 mg/gram) vaginal cream; Place 2 g vaginally every 7 days.  Dispense: 42.5 g; Refill: 5    7. Eczema, unspecified type  -     triamcinolone acetonide 0.1% (KENALOG) 0.1 % cream; Apply topically 2 (two) times daily. To AA  Dispense: 80 g; Refill: 1      Follow up in about 6 months (around 8/25/2025) for HTN, lipids.          Counseled on age and gender appropriate medical preventative services, including cancer screenings, immunizations, overall nutritional health, need for a consistent exercise regimen and an overall push towards maintaining a vigorous and active lifestyle.      This note was generated with the assistance of ambient listening technology. Verbal consent was obtained by the patient and accompanying visitor(s) for the recording of patient appointment to facilitate this note. I attest to having reviewed and edited the generated note for accuracy, though some syntax or spelling errors may  persist. Please contact the author of this note for any clarification.       2/25/2025 Jennyfer Hernandez NP           [1]   Current Outpatient Medications:     amLODIPine (NORVASC) 5 MG tablet, Take 1 tablet (5 mg total) by mouth once daily., Disp: 90 tablet, Rfl: 2    calcium carbonate (OS-KYLAH) 500 mg calcium (1,250 mg) tablet, Take 1 tablet by mouth once daily., Disp: , Rfl:     cholecalciferol, vitamin D3, (VITAMIN D3) 2,000 unit Tab, Take 1 tablet by mouth once daily., Disp: , Rfl:     fish oil-omega-3 fatty acids 300-1,000 mg capsule, Take 2 g by mouth once daily., Disp: , Rfl:     metoprolol succinate (TOPROL-XL) 50 MG 24 hr tablet, Take 1 tablet (50 mg total) by mouth once daily., Disp: 90 tablet, Rfl: 2    MULTIVITAMIN (MULTIPLE VITAMIN ORAL), Take 1 tablet by mouth once daily., Disp: , Rfl:     alendronate (FOSAMAX) 70 MG tablet, Take 1 tablet (70 mg total) by mouth every 7 days. take on empty stomach with full glass of water/do not eat or lie down for 1 hour after. For osteopenia, Disp: 12 tablet, Rfl: 3    estradioL (ESTRACE) 0.01 % (0.1 mg/gram) vaginal cream, Place 2 g vaginally every 7 days., Disp: 42.5 g, Rfl: 5    lovastatin (MEVACOR) 40 MG tablet, Take 1 tablet (40 mg total) by mouth every evening., Disp: 90 tablet, Rfl: 3    mirtazapine (REMERON) 15 MG tablet, Take 1 tablet (15 mg total) by mouth every evening., Disp: 90 tablet, Rfl: 3    triamcinolone acetonide 0.1% (KENALOG) 0.1 % cream, Apply topically 2 (two) times daily. To AA, Disp: 80 g, Rfl: 1

## 2025-03-17 ENCOUNTER — OFFICE VISIT (OUTPATIENT)
Dept: CARDIOLOGY | Facility: CLINIC | Age: 75
End: 2025-03-17
Payer: MEDICARE

## 2025-03-17 VITALS
HEIGHT: 59 IN | SYSTOLIC BLOOD PRESSURE: 145 MMHG | WEIGHT: 114.06 LBS | HEART RATE: 67 BPM | OXYGEN SATURATION: 96 % | DIASTOLIC BLOOD PRESSURE: 82 MMHG | BODY MASS INDEX: 23 KG/M2

## 2025-03-17 DIAGNOSIS — I10 HYPERTENSION, UNSPECIFIED TYPE: ICD-10-CM

## 2025-03-17 DIAGNOSIS — E78.2 MIXED HYPERLIPIDEMIA: ICD-10-CM

## 2025-03-17 DIAGNOSIS — Z98.890 S/P AORTIC DISSECTION REPAIR: Primary | ICD-10-CM

## 2025-03-17 DIAGNOSIS — Z86.79 H/O AORTIC DISSECTION: ICD-10-CM

## 2025-03-17 PROCEDURE — 1126F AMNT PAIN NOTED NONE PRSNT: CPT | Mod: CPTII,S$GLB,, | Performed by: INTERNAL MEDICINE

## 2025-03-17 PROCEDURE — 99214 OFFICE O/P EST MOD 30 MIN: CPT | Mod: S$GLB,,, | Performed by: INTERNAL MEDICINE

## 2025-03-17 PROCEDURE — 3077F SYST BP >= 140 MM HG: CPT | Mod: CPTII,S$GLB,, | Performed by: INTERNAL MEDICINE

## 2025-03-17 PROCEDURE — 3066F NEPHROPATHY DOC TX: CPT | Mod: CPTII,S$GLB,, | Performed by: INTERNAL MEDICINE

## 2025-03-17 PROCEDURE — 3008F BODY MASS INDEX DOCD: CPT | Mod: CPTII,S$GLB,, | Performed by: INTERNAL MEDICINE

## 2025-03-17 PROCEDURE — G2211 COMPLEX E/M VISIT ADD ON: HCPCS | Mod: S$GLB,,, | Performed by: INTERNAL MEDICINE

## 2025-03-17 PROCEDURE — 3079F DIAST BP 80-89 MM HG: CPT | Mod: CPTII,S$GLB,, | Performed by: INTERNAL MEDICINE

## 2025-03-17 PROCEDURE — 99999 PR PBB SHADOW E&M-EST. PATIENT-LVL III: CPT | Mod: PBBFAC,,, | Performed by: INTERNAL MEDICINE

## 2025-03-17 PROCEDURE — 3061F NEG MICROALBUMINURIA REV: CPT | Mod: CPTII,S$GLB,, | Performed by: INTERNAL MEDICINE

## 2025-03-17 RX ORDER — AMLODIPINE BESYLATE 2.5 MG/1
2.5 TABLET ORAL DAILY
Qty: 90 TABLET | Refills: 1 | Status: SHIPPED | OUTPATIENT
Start: 2025-03-17 | End: 2025-09-13

## 2025-03-17 NOTE — PROGRESS NOTES
Subjective:    Patient ID:  Tanika Olivera is a 74 y.o. female patient here for evaluation Follow-up (6 month f/u no issues stated by pt )    03/17/2025:   Denies any new symptoms.  Exercising regularly.  Monitoring blood pressure at home.  Systolic blood pressure mildly elevated to 130s at home.  Compliant with the medications.    Follow up visit 09/26/2024:  Came for follow up of hypertension blood.  Home BP log reviewed.  Blood pressure very well controlled.  Denies any symptoms.    History of Present Illness during initial clinic visit:     73 year old female came here for initial evaluation and establishment of care.  Patient with past medical history of hypertension, hyperlipidemia, hepatitis-C status post treatment.  [facility] and was transferred to [facility] for emergent surgical repair.  Aortic dissection appeared to be acute on chronic per surgical report and there was evidence of ascending aortic aneurysm of 4.7 cm on CT.  Patient stated her blood pressure was not well controlled prior to this episode.  After the surgery, her lisinopril was discontinued and metoprolol dose was reduced due to soft blood pressure however she is not checking blood pressures regularly at home currently.  She follows low-salt diet.  Denies family history of aortic dissection.  Physically active at baseline and denies anginal symptoms      Review of patient's allergies indicates:  No Known Allergies    Past Medical History:   Diagnosis Date    History of hepatitis C, s/p successful Rx w/ SVR24 (cure) - 11/2018 2/23/2017    S/p epclusa w/ SVR    HTN (hypertension)     Hyperlipidemia     Osteoporosis      Past Surgical History:   Procedure Laterality Date    BREAST SURGERY  02/13/2007    Breast implants    CARDIAC VALVE REPLACEMENT  6/27/24    COLONOSCOPY N/A 07/05/2022    Procedure: COLONOSCOPY;  Surgeon: Fran Palomares MD;  Location: [facility];   Service: Endoscopy;  Laterality: N/A;    CORONARY ARTERY BYPASS GRAFT  6/27/24    EYE SURGERY  4/17/2019    Cataract surgery    REPAIR OF ASCENDING AORTA N/A 06/27/2024    Procedure: ASCENDING AORTA REPAIR;  Surgeon: Jean Paul Sharma MD;  Location: [facility] OR [address] [place] [address];  Service: Cardiovascular;  Laterality: N/A;  Type A Dissection repair, Hemiarch replacement 28 mm graft , Deep hypothermic circulatory arrest    TOTAL ABDOMINAL HYSTERECTOMY       Social History     Tobacco Use    Smoking status: Never     Passive exposure: Never    Smokeless tobacco: Never   Substance Use Topics    Alcohol use: No    Drug use: No        Review of Systems   Negative except as mentioned in HPI         Objective        Vitals:    03/17/25 1353   BP: (!) 145/82   Pulse: 67       LIPIDS - LAST 2   Lab Results   Component Value Date    CHOL 179 02/19/2025    CHOL 152 09/09/2024    HDL 77 02/19/2025    HDL 68 09/09/2024    LDLCALC 84 02/19/2025    LDLCALC 66.4 09/09/2024    TRIG 89 02/19/2025    TRIG 88 09/09/2024    CHOLHDL 2.3 02/19/2025    CHOLHDL 44.7 09/09/2024       CBC - LAST 2  Lab Results   Component Value Date    WBC 4.7 02/19/2025    WBC 7.50 07/02/2024    RBC 4.64 02/19/2025    RBC 3.26 (L) 07/02/2024    HGB 14.0 02/19/2025    HGB 9.8 (L) 07/02/2024    HCT 42.2 02/19/2025    HCT 29.8 (L) 07/02/2024    MCV 90.9 02/19/2025    MCV 91 07/02/2024    MCH 30.2 02/19/2025    MCH 30.1 07/02/2024    MCHC 33.2 02/19/2025    MCHC 32.9 07/02/2024    RDW 13.0 02/19/2025    RDW 13.2 07/02/2024     02/19/2025     07/02/2024    MPV 9.6 02/19/2025    MPV 9.6 07/02/2024    GRAN 9.2 (H) 06/27/2024    GRAN 86.1 (H) 06/27/2024    LYMPH 1,777 02/19/2025    LYMPH 37.8 02/19/2025    MONO 611 02/19/2025    MONO 13.0 02/19/2025    BASO 19 02/19/2025    BASO 0.01 06/27/2024    NRBC 0 06/27/2024    NRBC 0 06/27/2024       CHEMISTRY & LIVER FUNCTION - LAST 2  Lab Results   Component Value Date     02/19/2025     (L) 07/02/2024    K 4.5  02/19/2025    K 4.7 07/02/2024    CL 99 02/19/2025    CL 99 07/02/2024    CO2 31 02/19/2025    CO2 29 07/02/2024    ANIONGAP 6 (L) 07/02/2024    ANIONGAP 9 07/01/2024    BUN 14 02/19/2025    BUN 12 07/02/2024    CREATININE 0.77 02/19/2025    CREATININE 0.6 07/02/2024    GLU 95 02/19/2025     07/02/2024    CALCIUM 9.6 02/19/2025    CALCIUM 8.8 07/02/2024    PH 7.445 06/29/2024    PH 7.471 (H) 06/28/2024    MG 2.0 07/02/2024    MG 2.0 07/01/2024    ALBUMIN 4.4 02/19/2025    ALBUMIN 2.7 (L) 07/02/2024    PROT 7.8 02/19/2025    PROT 6.0 07/02/2024    ALKPHOS 52 (L) 07/02/2024    ALKPHOS 61 07/01/2024    ALT 14 02/19/2025    ALT 19 07/02/2024    AST 22 02/19/2025    AST 35 07/02/2024    BILITOT 0.5 02/19/2025    BILITOT 0.5 07/02/2024        CARDIAC PROFILE - LAST 2  Lab Results   Component Value Date     (H) 06/25/2024    TROPONINIHS 8.3 06/25/2024    TROPONINIHS 7.5 06/25/2024        COAGULATION - LAST 2  Lab Results   Component Value Date    INR 1.0 06/30/2024    INR 1.0 06/29/2024    APTT 27.0 07/01/2024    APTT 29.0 06/30/2024       ENDOCRINE & PSA - LAST 2  Lab Results   Component Value Date    MICROALBUR 1.0 02/19/2025    MICROALBUR 0.2 02/14/2024    TSH 3.42 05/01/2020        ECHOCARDIOGRAM RESULTS  Results for orders placed during the hospital encounter of 06/25/24    Echo    Interpretation Summary    Left Ventricle: The left ventricle is normal in size. Ventricular mass is normal. Normal wall thickness. Septal motion is abnormal. There is intermittent septal shift into the left ventricle with expansion of the right ventricle. There is annulus reversus. Consider constrictive physiology. There is normal systolic function with a visually estimated ejection fraction of 55 - 60%. There is normal diastolic function.    Right Ventricle: Normal right ventricular cavity size. Wall thickness is normal. Systolic function is normal.    Mitral Valve: There is mild regurgitation.    Aorta: Prosthetic graft  present in the ascending aorta.    Pericardium: Left pleural effusion.    OPERATIONS:  1. Emergent repair of type A aortic dissection under hypothermic circulatory arrest.  2. Replacement of ascending and hemiarch using a 28 size Gelweave graft.      CURRENT/PREVIOUS VISIT EKG  Results for orders placed or performed in visit on 08/09/24   IN OFFICE EKG 12-LEAD (to [place])    Collection Time: 08/09/24  2:23 PM   Result Value Ref Range    QRS Duration 66 ms    [facility] QTC Calculation 443 ms    Narrative    Test Reason : Z98.890,Z86.79,    Vent. Rate : 082 BPM     Atrial Rate : 082 BPM     P-R Int : 134 ms          QRS Dur : 066 ms      QT Int : 380 ms       P-R-T Axes : 058 031 -05 degrees     QTc Int : 443 ms    Normal sinus rhythm  Nonspecific T wave abnormality  Abnormal ECG  When compared with ECG of 22-JUL-2024 10:48,  QT has shortened  Confirmed by La FARNSWORTH, Bryce WELCH ([id number]) on 8/19/2024 8:53:06 PM    Referred By: AMSIHA BAILEY           Confirmed By:Bryce Tovar MD     No valid procedures specified.   No results found for this or any previous visit.    No valid procedures specified.          PREVIOUS STRESS TEST              PREVIOUS ANGIOGRAM        PHYSICAL EXAM    CONSTITUTIONAL: Well built, well nourished in no apparent distress  HEENT: No pallor  NECK: no JVD  LUNGS: CTA b/l  HEART: regular rate and rhythm, S1, S2 normal, no murmur   ABDOMEN:  Nondistended  EXTREMITIES: No edema  NEURO: AAO X 3   SKIN:  No rash  Psych:  Normal affect    I HAVE REVIEWED :    The vital signs, nurses notes, and all the pertinent radiology and labs.        Current Outpatient Medications   Medication Instructions    alendronate (FOSAMAX) 70 mg, Oral, Every 7 days, take on empty stomach with full glass of water/do not eat or lie down for 1 hour after. For osteopenia    amLODIPine (NORVASC) 5 mg, Oral, Daily    amLODIPine (NORVASC) 2.5 mg, Oral, Daily    calcium carbonate (OS-KYLAH) 500 mg calcium (1,250 mg) tablet 1 tablet, Daily     cholecalciferol, vitamin D3, (VITAMIN D3) 2,000 unit Tab 1 tablet, Daily    estradioL (ESTRACE) 2 g, Vaginal, Every 7 days    fish oil-omega-3 fatty acids 300-1,000 mg capsule 2 g, Daily    lovastatin (MEVACOR) 40 mg, Oral, Nightly    metoprolol succinate (TOPROL-XL) 50 mg, Oral, Daily    mirtazapine (REMERON) 15 mg, Oral, Nightly    MULTIVITAMIN (MULTIPLE VITAMIN ORAL) 1 tablet, Daily    triamcinolone acetonide 0.1% (KENALOG) 0.1 % cream Topical (Top), 2 times daily, To AA       Assessment & Plan     H/o Type A aortic dissection status post emergent surgical repair 06/ 2024  Hypertension-systolic blood pressure mildly elevated at home to 130s.  Compliant with the medications.  Preserved ejection fraction on echocardiogram.    Dyslipidemia-controlled. LDL 84  History of hep C status post treatment    Plan:  Discussed the importance of strict blood pressure control  Continue Toprol-XL 50 mg daily  Increase amlodipine dose to 7.5 mg daily  Continue home BP monitoring with a BP log  Low-sodium diet  Continue lovastatin and aspirin 81 mg daily.  Follow up in about 6 months (around 9/17/2025).

## 2025-06-23 LAB — BCS RECOMMENDATION EXT: NORMAL

## 2025-06-26 ENCOUNTER — PATIENT OUTREACH (OUTPATIENT)
Dept: ADMINISTRATIVE | Facility: HOSPITAL | Age: 75
End: 2025-06-26
Payer: MEDICARE

## 2025-07-24 ENCOUNTER — TELEPHONE (OUTPATIENT)
Dept: CARDIOLOGY | Facility: CLINIC | Age: 75
End: 2025-07-24
Payer: MEDICARE

## 2025-07-24 NOTE — TELEPHONE ENCOUNTER
Copied from CRM #[id number]. Topic: Appointments - Appointment Access  >> Jul 24, 2025 12:57 PM Radha wrote:  Type:  Sooner Apoointment Request    Name of Caller:pt     When is the first available appointment?07/28  Symptoms:6m fu     Best Call Back Number:[phone]    Additional Information: pt wants to be schedule with her  on 09/29 at 1:45 pm. Please call to discuss.

## 2025-08-11 ENCOUNTER — TELEPHONE (OUTPATIENT)
Dept: FAMILY MEDICINE | Facility: CLINIC | Age: 75
End: 2025-08-11
Payer: MEDICARE

## 2025-08-11 DIAGNOSIS — Z79.899 ENCOUNTER FOR LONG-TERM (CURRENT) USE OF MEDICATIONS: Primary | ICD-10-CM

## 2025-08-11 DIAGNOSIS — E78.2 MIXED HYPERLIPIDEMIA: ICD-10-CM

## 2025-08-25 ENCOUNTER — OFFICE VISIT (OUTPATIENT)
Dept: FAMILY MEDICINE | Facility: CLINIC | Age: 75
End: 2025-08-25
Payer: MEDICARE

## 2025-08-25 VITALS
HEART RATE: 70 BPM | HEIGHT: 59 IN | SYSTOLIC BLOOD PRESSURE: 132 MMHG | WEIGHT: 116.81 LBS | OXYGEN SATURATION: 99 % | DIASTOLIC BLOOD PRESSURE: 72 MMHG | BODY MASS INDEX: 23.55 KG/M2

## 2025-08-25 DIAGNOSIS — I10 ESSENTIAL HYPERTENSION: Primary | ICD-10-CM

## 2025-08-25 DIAGNOSIS — M85.88 OSTEOPENIA OF LUMBAR SPINE: ICD-10-CM

## 2025-08-25 DIAGNOSIS — Z12.11 ENCOUNTER FOR SCREENING COLONOSCOPY: ICD-10-CM

## 2025-08-25 DIAGNOSIS — Z13.820 SCREENING FOR OSTEOPOROSIS: ICD-10-CM

## 2025-08-25 DIAGNOSIS — E78.2 MIXED HYPERLIPIDEMIA: ICD-10-CM

## 2025-08-25 DIAGNOSIS — Z98.890 S/P AORTIC DISSECTION REPAIR: ICD-10-CM

## 2025-08-25 DIAGNOSIS — Z78.0 MENOPAUSE: ICD-10-CM

## 2025-08-25 PROCEDURE — G2211 COMPLEX E/M VISIT ADD ON: HCPCS | Mod: S$GLB,,, | Performed by: NURSE PRACTITIONER

## 2025-08-25 PROCEDURE — 3075F SYST BP GE 130 - 139MM HG: CPT | Mod: CPTII,S$GLB,, | Performed by: NURSE PRACTITIONER

## 2025-08-25 PROCEDURE — 3008F BODY MASS INDEX DOCD: CPT | Mod: CPTII,S$GLB,, | Performed by: NURSE PRACTITIONER

## 2025-08-25 PROCEDURE — 3288F FALL RISK ASSESSMENT DOCD: CPT | Mod: CPTII,S$GLB,, | Performed by: NURSE PRACTITIONER

## 2025-08-25 PROCEDURE — 3066F NEPHROPATHY DOC TX: CPT | Mod: CPTII,S$GLB,, | Performed by: NURSE PRACTITIONER

## 2025-08-25 PROCEDURE — 1160F RVW MEDS BY RX/DR IN RCRD: CPT | Mod: CPTII,S$GLB,, | Performed by: NURSE PRACTITIONER

## 2025-08-25 PROCEDURE — 3078F DIAST BP <80 MM HG: CPT | Mod: CPTII,S$GLB,, | Performed by: NURSE PRACTITIONER

## 2025-08-25 PROCEDURE — 1101F PT FALLS ASSESS-DOCD LE1/YR: CPT | Mod: CPTII,S$GLB,, | Performed by: NURSE PRACTITIONER

## 2025-08-25 PROCEDURE — 1159F MED LIST DOCD IN RCRD: CPT | Mod: CPTII,S$GLB,, | Performed by: NURSE PRACTITIONER

## 2025-08-25 PROCEDURE — 3061F NEG MICROALBUMINURIA REV: CPT | Mod: CPTII,S$GLB,, | Performed by: NURSE PRACTITIONER

## 2025-08-25 PROCEDURE — 1126F AMNT PAIN NOTED NONE PRSNT: CPT | Mod: CPTII,S$GLB,, | Performed by: NURSE PRACTITIONER

## 2025-08-25 PROCEDURE — 99214 OFFICE O/P EST MOD 30 MIN: CPT | Mod: S$GLB,,, | Performed by: NURSE PRACTITIONER

## 2025-08-25 RX ORDER — AMLODIPINE BESYLATE 5 MG/1
5 TABLET ORAL DAILY
COMMUNITY

## 2025-08-27 ENCOUNTER — TELEPHONE (OUTPATIENT)
Dept: GASTROENTEROLOGY | Facility: CLINIC | Age: 75
End: 2025-08-27
Payer: MEDICARE

## 2025-08-27 ENCOUNTER — PATIENT MESSAGE (OUTPATIENT)
Dept: GASTROENTEROLOGY | Facility: CLINIC | Age: 75
End: 2025-08-27
Payer: MEDICARE

## 2025-08-27 DIAGNOSIS — Z86.0100 HISTORY OF COLON POLYPS: Primary | ICD-10-CM

## 2025-08-27 DIAGNOSIS — Z12.11 SCREENING FOR COLON CANCER: ICD-10-CM

## 2025-09-02 ENCOUNTER — TELEPHONE (OUTPATIENT)
Dept: FAMILY MEDICINE | Facility: CLINIC | Age: 75
End: 2025-09-02
Payer: MEDICARE

## (undated) DEVICE — PATCH SEALANT EVARREST 2X4

## (undated) DEVICE — SOL 9P NACL IRR PIC IL

## (undated) DEVICE — TRAY HEART OMC

## (undated) DEVICE — RETRACTOR OCTOBASE INSERT HOLD

## (undated) DEVICE — SUT PROLENE 5-0 BL C-1 4-24

## (undated) DEVICE — GLOVE BIOGEL PI MICRO SZ 7.5

## (undated) DEVICE — PAD DEFIB CADENCE ADULT R2

## (undated) DEVICE — BLADE STERN 65.8MM

## (undated) DEVICE — SUT 3-0 CTD VICRYL 27IN PS

## (undated) DEVICE — SUT VICRYL BR 1 GEN 27 CT-1

## (undated) DEVICE — SUT PROLENE 4-0 SH BLU 36IN

## (undated) DEVICE — HEMOSTAT SURGICEL NUKNIT 3X4IN

## (undated) DEVICE — SUT 2/0 36IN COATED VICRYL

## (undated) DEVICE — DECANTER FLUID TRNSF WHITE 9IN

## (undated) DEVICE — SOL NACL 0.9% INJ 500ML BG

## (undated) DEVICE — ELECTRODE PAD DEFIB STERILE

## (undated) DEVICE — SUT SILK BLK BR. 2 2-60

## (undated) DEVICE — SUT MCRYL PLUS 4-0 PS2 27IN

## (undated) DEVICE — ELECTRODE REM PLYHSV RETURN 9

## (undated) DEVICE — SOL NS 1000CC

## (undated) DEVICE — KIT URINARY CATH URINE METER

## (undated) DEVICE — Device

## (undated) DEVICE — CANNULA AORTIC ROOT 12 GAUGE 9

## (undated) DEVICE — SUT 6 18IN STEEL MONO CCS

## (undated) DEVICE — CANNULA 29/29FR VACUUM ASSIST

## (undated) DEVICE — DRAPE CVMAX SPLIT ANES SCRN

## (undated) DEVICE — SUT PROLENE 4-0 RB-1 BL MO

## (undated) DEVICE — DRESSING ADH ISLAND 3.6 X 14

## (undated) DEVICE — DRESSING AQUACEL SACRAL 9 X 9

## (undated) DEVICE — FOGERTY SOFT JAW DISP 2/PK

## (undated) DEVICE — BOWL STERILE LARGE 32OZ

## (undated) DEVICE — SPONGE COTTON TRAY 4X4IN

## (undated) DEVICE — BLADE 4 INCH EDGE UN-INS

## (undated) DEVICE — SUT SILK 2-0 SH 18IN BLACK

## (undated) DEVICE — COVER SET UP STRL 54X54 20/BX

## (undated) DEVICE — INSERTS STEALTH FIBRA SIZE 5

## (undated) DEVICE — TOWEL OR XRAY WHITE 17X26IN

## (undated) DEVICE — BRA POST SURGICAL WHT 36-38IN

## (undated) DEVICE — BLADE SAW STERNAL 5/BX

## (undated) DEVICE — CLOSURE SKIN STERI STRIP 1/2X4

## (undated) DEVICE — SUT 2/0 30IN ETHIBOND

## (undated) DEVICE — DRAIN CHEST DRY SUCTION

## (undated) DEVICE — DRAPE SLUSH WARMER WITH DISC

## (undated) DEVICE — SPONGE GAUZE 16PLY 4X4

## (undated) DEVICE — KIT SAHARA DRAPE DRAW/LIFT

## (undated) DEVICE — SUT ETHIBOND EXCEL 2-0 SH-2